# Patient Record
Sex: FEMALE | Race: WHITE | NOT HISPANIC OR LATINO | Employment: PART TIME | URBAN - METROPOLITAN AREA
[De-identification: names, ages, dates, MRNs, and addresses within clinical notes are randomized per-mention and may not be internally consistent; named-entity substitution may affect disease eponyms.]

---

## 2017-04-14 ENCOUNTER — OFFICE VISIT (OUTPATIENT)
Dept: INTERNAL MEDICINE | Facility: CLINIC | Age: 52
End: 2017-04-14

## 2017-04-14 VITALS
HEIGHT: 67 IN | SYSTOLIC BLOOD PRESSURE: 116 MMHG | TEMPERATURE: 97.8 F | HEART RATE: 92 BPM | DIASTOLIC BLOOD PRESSURE: 72 MMHG | RESPIRATION RATE: 16 BRPM | WEIGHT: 195.4 LBS | OXYGEN SATURATION: 95 % | BODY MASS INDEX: 30.67 KG/M2

## 2017-04-14 DIAGNOSIS — Z00.00 ENCOUNTER FOR ANNUAL PHYSICAL EXAM: Primary | ICD-10-CM

## 2017-04-14 LAB
ALBUMIN SERPL-MCNC: 4.4 G/DL (ref 3.5–5.2)
ALBUMIN/GLOB SERPL: 1.5 G/DL
ALP SERPL-CCNC: 127 U/L (ref 39–117)
ALT SERPL-CCNC: 38 U/L (ref 1–33)
AST SERPL-CCNC: 32 U/L (ref 1–32)
BASOPHILS # BLD AUTO: 0.03 10*3/MM3 (ref 0–0.2)
BASOPHILS NFR BLD AUTO: 0.5 % (ref 0–1.5)
BILIRUB BLD-MCNC: NEGATIVE MG/DL
BILIRUB SERPL-MCNC: 0.4 MG/DL (ref 0.1–1.2)
BUN SERPL-MCNC: 17 MG/DL (ref 6–20)
BUN/CREAT SERPL: 20.5 (ref 7–25)
CALCIUM SERPL-MCNC: 10 MG/DL (ref 8.6–10.5)
CHLORIDE SERPL-SCNC: 102 MMOL/L (ref 98–107)
CHOLEST SERPL-MCNC: 303 MG/DL (ref 0–200)
CLARITY, POC: CLEAR
CO2 SERPL-SCNC: 25.7 MMOL/L (ref 22–29)
COLOR UR: YELLOW
CREAT SERPL-MCNC: 0.83 MG/DL (ref 0.57–1)
EOSINOPHIL # BLD AUTO: 0.36 10*3/MM3 (ref 0–0.7)
EOSINOPHIL NFR BLD AUTO: 5.9 % (ref 0.3–6.2)
ERYTHROCYTE [DISTWIDTH] IN BLOOD BY AUTOMATED COUNT: 14.9 % (ref 11.7–13)
GLOBULIN SER CALC-MCNC: 3 GM/DL
GLUCOSE SERPL-MCNC: 92 MG/DL (ref 65–99)
GLUCOSE UR STRIP-MCNC: NEGATIVE MG/DL
HCT VFR BLD AUTO: 41.5 % (ref 35.6–45.5)
HDLC SERPL-MCNC: 56 MG/DL (ref 40–60)
HGB BLD-MCNC: 14.1 G/DL (ref 11.9–15.5)
IMM GRANULOCYTES # BLD: 0 10*3/MM3 (ref 0–0.03)
IMM GRANULOCYTES NFR BLD: 0 % (ref 0–0.5)
KETONES UR QL: NEGATIVE
LDLC SERPL CALC-MCNC: 195 MG/DL (ref 0–100)
LEUKOCYTE EST, POC: ABNORMAL
LYMPHOCYTES # BLD AUTO: 2.02 10*3/MM3 (ref 0.9–4.8)
LYMPHOCYTES NFR BLD AUTO: 33.2 % (ref 19.6–45.3)
MCH RBC QN AUTO: 29.9 PG (ref 26.9–32)
MCHC RBC AUTO-ENTMCNC: 34 G/DL (ref 32.4–36.3)
MCV RBC AUTO: 87.9 FL (ref 80.5–98.2)
MONOCYTES # BLD AUTO: 0.4 10*3/MM3 (ref 0.2–1.2)
MONOCYTES NFR BLD AUTO: 6.6 % (ref 5–12)
NEUTROPHILS # BLD AUTO: 3.28 10*3/MM3 (ref 1.9–8.1)
NEUTROPHILS NFR BLD AUTO: 53.8 % (ref 42.7–76)
NITRITE UR-MCNC: NEGATIVE MG/ML
PH UR: 5.5 [PH] (ref 5–8)
PLATELET # BLD AUTO: 255 10*3/MM3 (ref 140–500)
POTASSIUM SERPL-SCNC: 4.3 MMOL/L (ref 3.5–5.2)
PROT SERPL-MCNC: 7.4 G/DL (ref 6–8.5)
PROT UR STRIP-MCNC: NEGATIVE MG/DL
RBC # BLD AUTO: 4.72 10*6/MM3 (ref 3.9–5.2)
RBC # UR STRIP: NEGATIVE /UL
SODIUM SERPL-SCNC: 142 MMOL/L (ref 136–145)
SP GR UR: 1.03 (ref 1–1.03)
TRIGL SERPL-MCNC: 258 MG/DL (ref 0–150)
UROBILINOGEN UR QL: NORMAL
VLDLC SERPL CALC-MCNC: 51.6 MG/DL (ref 5–40)
WBC # BLD AUTO: 6.09 10*3/MM3 (ref 4.5–10.7)

## 2017-04-14 PROCEDURE — 81003 URINALYSIS AUTO W/O SCOPE: CPT | Performed by: INTERNAL MEDICINE

## 2017-04-14 PROCEDURE — 99396 PREV VISIT EST AGE 40-64: CPT | Performed by: INTERNAL MEDICINE

## 2017-04-14 RX ORDER — METRONIDAZOLE 10 MG/G
GEL TOPICAL DAILY
COMMUNITY
End: 2017-08-08
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
## 2017-04-17 RX ORDER — ATORVASTATIN CALCIUM 40 MG/1
40 TABLET, FILM COATED ORAL DAILY
Qty: 90 TABLET | Refills: 1 | Status: SHIPPED | OUTPATIENT
Start: 2017-04-17 | End: 2017-07-18

## 2017-05-02 ENCOUNTER — TELEPHONE (OUTPATIENT)
Dept: INTERNAL MEDICINE | Facility: CLINIC | Age: 52
End: 2017-05-02

## 2017-05-08 ENCOUNTER — TELEPHONE (OUTPATIENT)
Dept: INTERNAL MEDICINE | Facility: CLINIC | Age: 52
End: 2017-05-08

## 2017-05-08 RX ORDER — ROSUVASTATIN CALCIUM 5 MG/1
5 TABLET, COATED ORAL DAILY
Qty: 30 TABLET | Refills: 1 | Status: SHIPPED | OUTPATIENT
Start: 2017-05-08 | End: 2017-08-24 | Stop reason: SDUPTHER

## 2017-05-16 ENCOUNTER — TELEPHONE (OUTPATIENT)
Dept: INTERNAL MEDICINE | Facility: CLINIC | Age: 52
End: 2017-05-16

## 2017-05-16 RX ORDER — ZOLPIDEM TARTRATE 5 MG/1
TABLET ORAL
Qty: 2 TABLET | Refills: 0 | OUTPATIENT
Start: 2017-05-16 | End: 2017-07-18

## 2017-07-18 ENCOUNTER — OFFICE VISIT (OUTPATIENT)
Dept: INTERNAL MEDICINE | Facility: CLINIC | Age: 52
End: 2017-07-18

## 2017-07-18 VITALS
HEIGHT: 67 IN | RESPIRATION RATE: 16 BRPM | HEART RATE: 70 BPM | OXYGEN SATURATION: 96 % | BODY MASS INDEX: 31.36 KG/M2 | SYSTOLIC BLOOD PRESSURE: 102 MMHG | WEIGHT: 199.8 LBS | TEMPERATURE: 98 F | DIASTOLIC BLOOD PRESSURE: 70 MMHG

## 2017-07-18 DIAGNOSIS — K21.9 GASTROESOPHAGEAL REFLUX DISEASE WITHOUT ESOPHAGITIS: ICD-10-CM

## 2017-07-18 DIAGNOSIS — E78.2 MIXED HYPERLIPIDEMIA: Primary | ICD-10-CM

## 2017-07-18 LAB
CHOLEST SERPL-MCNC: 304 MG/DL (ref 0–200)
HDLC SERPL-MCNC: 52 MG/DL (ref 40–60)
LDLC SERPL CALC-MCNC: 222 MG/DL (ref 0–100)
TRIGL SERPL-MCNC: 152 MG/DL (ref 0–150)
VLDLC SERPL CALC-MCNC: 30.4 MG/DL (ref 5–40)

## 2017-07-18 PROCEDURE — 99213 OFFICE O/P EST LOW 20 MIN: CPT | Performed by: INTERNAL MEDICINE

## 2017-07-18 NOTE — PROGRESS NOTES
Subjective   Echo Montelongo is a 51 y.o. female.     Chief Complaint   Patient presents with   • Hyperlipidemia     re-check   • Heartburn         Hyperlipidemia   This is a chronic problem. The current episode started more than 1 year ago. The problem is controlled. Recent lipid tests were reviewed and are variable. There are no known factors aggravating her hyperlipidemia. Associated symptoms include myalgias. Pertinent negatives include no chest pain or shortness of breath. She is currently on no antihyperlipidemic treatment. There are no compliance problems.    Heartburn   She complains of abdominal pain, belching, dysphagia and heartburn. She reports no chest pain, no nausea or no wheezing. This is a new problem. The current episode started 1 to 4 weeks ago. The problem occurs frequently. The problem has been unchanged. Nothing aggravates the symptoms. Risk factors include hiatal hernia. She has tried an antacid and a PPI for the symptoms.        The following portions of the patient's history were reviewed and updated as appropriate: allergies, current medications, past social history and problem list.    Outpatient Prescriptions Marked as Taking for the 7/18/17 encounter (Office Visit) with Frandy Mejia MD   Medication Sig Dispense Refill   • metroNIDAZOLE (METROGEL) 1 % gel Apply  topically Daily.     • [DISCONTINUED] zolpidem (AMBIEN) 5 MG tablet 1 tablet by mouth at time of flight. 2 tablet 0       Review of Systems   Respiratory: Negative for shortness of breath and wheezing.    Cardiovascular: Negative for chest pain and palpitations.   Gastrointestinal: Positive for abdominal pain, dysphagia and heartburn. Negative for nausea.   Musculoskeletal: Positive for myalgias.       Objective   Vitals:    07/18/17 0844   BP: 102/70   Pulse: 70   Resp: 16   Temp: 98 °F (36.7 °C)   SpO2: 96%      Last Weight    07/18/17  0844   Weight: 199 lb 12.8 oz (90.6 kg)    [unfilled]  Body mass index is 31.29  kg/(m^2).      Physical Exam   Constitutional: She appears well-developed and well-nourished. No distress.   HENT:   Head: Normocephalic and atraumatic.   Neck: Carotid bruit is not present.   Cardiovascular: Normal rate, regular rhythm, normal heart sounds and intact distal pulses.  Exam reveals no gallop.    No murmur heard.  Pulmonary/Chest: Effort normal and breath sounds normal. No respiratory distress. She has no wheezes. She has no rales.   Abdominal: Soft. Bowel sounds are normal. She exhibits no mass. There is no tenderness. There is no guarding.         Problem List Items Addressed This Visit        Cardiovascular and Mediastinum    Mixed hyperlipidemia - Primary    Relevant Orders    Lipid Panel       Digestive    GERD (gastroesophageal reflux disease)        Assessment/Plan   In for recheck of lipids.  She did not tolerate Lipitor.  Crestor was better but still caused myalgias.  She is now off of therapy and trying to control things with diet.  She's developed heartburn in the past month.  She is actually had a long history of heartburn on and off.  It resolved with weight loss a few years ago.  Generally over the year she's used PPIs and antacids when she's needed them.  In the past month she's developed some midsternal dysphagia..  Fatigue continues to be a problem.  Her sleep is disrupted.  She is up multiple times per night.  She generally tries to get 8 hours and other than to disruptions.  Continues to have hair loss.  Eyelashes and scalp.  The scalp is better.  She is already on Latisse for her eyelashes.   Will repeat lipids today.  She is fasting.  We'll begin on Protonix 40 mg daily for her GI symptoms.  If they do not resolve she'll need an EGD.  She had a upper GI several years ago showed a small hiatal hernia.She is off of lipid treatment right now due to some myalgias on the Crestor.  If lipids are still elevated she's going to try the Crestor again with some additional co Q 10 200 mg  daily.  If that's the problem will switch to Pravachol.         Dragon disclaimer:   Much of this encounter note is an electronic transcription/translation of spoken language to printed text. The electronic translation of spoken language may permit erroneous, or at times, nonsensical words or phrases to be inadvertently transcribed; Although I have reviewed the note for such errors, some may still exist.

## 2017-08-08 ENCOUNTER — OFFICE VISIT (OUTPATIENT)
Dept: OBSTETRICS AND GYNECOLOGY | Age: 52
End: 2017-08-08

## 2017-08-08 VITALS
SYSTOLIC BLOOD PRESSURE: 110 MMHG | BODY MASS INDEX: 31.55 KG/M2 | HEIGHT: 67 IN | DIASTOLIC BLOOD PRESSURE: 72 MMHG | WEIGHT: 201 LBS

## 2017-08-08 DIAGNOSIS — Z87.42 H/O ABNORMAL CERVICAL PAPANICOLAOU SMEAR: ICD-10-CM

## 2017-08-08 DIAGNOSIS — R39.15 URINARY URGENCY: ICD-10-CM

## 2017-08-08 DIAGNOSIS — Z01.419 WELL WOMAN EXAM WITH ROUTINE GYNECOLOGICAL EXAM: Primary | ICD-10-CM

## 2017-08-08 DIAGNOSIS — Z11.51 SCREENING FOR HPV (HUMAN PAPILLOMAVIRUS): ICD-10-CM

## 2017-08-08 LAB
BILIRUB BLD-MCNC: NEGATIVE MG/DL
CLARITY, POC: CLEAR
COLOR UR: YELLOW
GLUCOSE UR STRIP-MCNC: NEGATIVE MG/DL
KETONES UR QL: NEGATIVE
LEUKOCYTE EST, POC: NEGATIVE
NITRITE UR-MCNC: POSITIVE MG/ML
PH UR: 5.5 [PH] (ref 5–8)
PROT UR STRIP-MCNC: NEGATIVE MG/DL
RBC # UR STRIP: NEGATIVE /UL
SP GR UR: 1.02 (ref 1–1.03)
UROBILINOGEN UR QL: NORMAL

## 2017-08-08 PROCEDURE — 81002 URINALYSIS NONAUTO W/O SCOPE: CPT | Performed by: PHYSICIAN ASSISTANT

## 2017-08-08 PROCEDURE — 99396 PREV VISIT EST AGE 40-64: CPT | Performed by: PHYSICIAN ASSISTANT

## 2017-08-08 RX ORDER — OMEPRAZOLE 20 MG/1
20 CAPSULE, DELAYED RELEASE ORAL DAILY
COMMUNITY
End: 2018-03-30

## 2017-08-08 RX ORDER — NITROFURANTOIN 25; 75 MG/1; MG/1
100 CAPSULE ORAL 2 TIMES DAILY
Qty: 14 CAPSULE | Refills: 0 | Status: SHIPPED | OUTPATIENT
Start: 2017-08-08 | End: 2017-08-15

## 2017-08-08 NOTE — PROGRESS NOTES
Subjective     Chief Complaint   Patient presents with   • Gynecologic Exam     annual exam.       History of Present Illness    Echo Montelongo is a 51 y.o.  who presents for annual exam.    She is feeling a little bit crappy every day and thinks it is r/t her MP state.  She was given HRT by Dr Ge and had it filled but never took it.  She has been working on diet and exercise and trying to do the right thing.  She did work with a naturopath and took some herbs for a little while but recently found that her cholesterol was high and she is being tested for fatty liver.  She is also working on meditation but still has issues with sleep.  She is not having hot flashes but wakes up periodically and is unsure what is causing her to wake up.  She is really hesitant to take the HRT    She is also noting possible yeast infections. She notes a vaginal burning.  She does not self treat, she just waits for it to go away. She can't tell if it is a UTI or something else.  She has been to UC a few times and cultures have shown a UTI.  She was given macrodantin to take prn IC but forgets to use it.      She is utd on her exams with Dr Mejia and is working on decreasing her cholesterol.  She is on crestor    She is a PT at [employer]        Obstetric History:  OB History      Para Term  AB TAB SAB Ectopic Multiple Living    2 2 1 1      2         Menstrual History:     No LMP recorded. Patient has had an ablation.         Current contraception: post menopausal status  History of abnormal Pap smear: yes - colpo 2 years ago and it was wnl  Received Gardasil immunization: no  Perform regular self breast exam: yes - occl  Family history of uterine or ovarian cancer: no  Family History of colon cancer: no  Family history of breast cancer: no    Mammogram: ordered.  Colonoscopy: recommended. Thinks she had one in .  Will check with   DEXA: not indicated.    Exercise: not active  Calcium/Vitamin D: adequate  "intake    The following portions of the patient's history were reviewed and updated as appropriate: allergies, current medications, past family history, past medical history, past social history, past surgical history and problem list.    Review of Systems   All other systems reviewed and are negative.      Review of Systems   Constitutional: Negative for fatigue.   Respiratory: Negative for shortness of breath.    Gastrointestinal: Negative for abdominal pain.   Genitourinary: Negative for dysuria.   Neurological: Negative for headaches.   Psychiatric/Behavioral: Negative for dysphoric mood.         Objective   Physical Exam    /72  Ht 67\" (170.2 cm)  Wt 201 lb (91.2 kg)  Breastfeeding? No  BMI 31.48 kg/m2    General:   alert, appears stated age and cooperative   Neck: no adenopathy and thyroid normal to palpation   Heart: regular rate and rhythm   Lungs: clear to auscultation bilaterally   Abdomen: soft and nontender   Breast: inspection negative, no nipple discharge or bleeding, no masses or nodularity palpable   Vulva: normal   Vagina: normal mucosa   Cervix: no lesions   Uterus: normal size, non-tender   Adnexa: normal adnexa and no mass, fullness, tenderness   Rectal: normal rectal, no masses, guaiac negative stool obtained and hemorrhoids: external non-thrombosed     Assessment/Plan   Echo was seen today for gynecologic exam.    Diagnoses and all orders for this visit:    Well woman exam with routine gynecological exam  -     IGP, Aptima HPV, Rfx 16 / 18,45    Urinary urgency  -     POC Urinalysis Dipstick  -     Urine Culture    Screening for HPV (human papillomavirus)  -     IGP, Aptima HPV, Rfx 16 / 18,45    H/O abnormal cervical Papanicolaou smear  -     IGP, Aptima HPV, Rfx 16 / 18,45    Other orders  -     nitrofurantoin, macrocrystal-monohydrate, (MACROBID) 100 MG capsule; Take 1 capsule by mouth 2 (Two) Times a Day for 7 days.        All questions answered.  Breast self exam technique " reviewed and patient encouraged to perform self-exam monthly.  Discussed healthy lifestyle modifications.  Recommended 30 minutes of aerobic exercise five times per week.  Discussed calcium needs to prevent osteoporosis.    Disc pap guidelines, will do pap with HPV  Disc MP sx's, she prefers to try her methods now and will call if she wants to do something different later (try HRT)  Disc recurrent UTI's, discussed estrace and gave sample to try .  Will send in rx as well.  Disc that she might find some benefit to her vaginal estrogen as far as some systemic SE as well  Will treat suspected UTI and discussed using macrodantin prn IC in meantime as well as urinating after IC and increasing hydration.

## 2017-08-11 LAB
BACTERIA UR CULT: ABNORMAL
BACTERIA UR CULT: ABNORMAL
CYTOLOGIST CVX/VAG CYTO: ABNORMAL
CYTOLOGY CVX/VAG DOC THIN PREP: ABNORMAL
DX ICD CODE: ABNORMAL
DX ICD CODE: ABNORMAL
HIV 1 & 2 AB SER-IMP: ABNORMAL
HPV I/H RISK 4 DNA CVX QL PROBE+SIG AMP: POSITIVE
OTHER ANTIBIOTIC SUSC ISLT: ABNORMAL
OTHER STN SPEC: ABNORMAL
PATH REPORT.FINAL DX SPEC: ABNORMAL
PATHOLOGIST CVX/VAG CYTO: ABNORMAL
STAT OF ADQ CVX/VAG CYTO-IMP: ABNORMAL

## 2017-08-14 ENCOUNTER — TELEPHONE (OUTPATIENT)
Dept: OBSTETRICS AND GYNECOLOGY | Age: 52
End: 2017-08-14

## 2017-08-14 NOTE — TELEPHONE ENCOUNTER
----- Message from Pino Ge MD sent at 8/14/2017  2:22 PM EDT -----  Please notify patient Pap smear shows dysplasia low-grade, positive HPV and requires follow-up colposcopy preferably in 6-8 weeks to give the cervix a chance to change

## 2017-08-24 RX ORDER — ROSUVASTATIN CALCIUM 5 MG/1
TABLET, COATED ORAL
Qty: 30 TABLET | Refills: 0 | Status: SHIPPED | OUTPATIENT
Start: 2017-08-24 | End: 2017-09-01 | Stop reason: SINTOL

## 2017-09-01 ENCOUNTER — TELEPHONE (OUTPATIENT)
Dept: INTERNAL MEDICINE | Facility: CLINIC | Age: 52
End: 2017-09-01

## 2017-09-01 RX ORDER — PRAVASTATIN SODIUM 10 MG
10 TABLET ORAL DAILY
Qty: 90 TABLET | Refills: 1 | Status: SHIPPED | OUTPATIENT
Start: 2017-09-01 | End: 2017-12-01

## 2017-09-01 NOTE — TELEPHONE ENCOUNTER
Patient finished her bottle of Crestor. She has been taking co q 10 along with it for the last 2-3 weeks, but she is still having muscle and joint aches. She does feel better on the Crestor than she did on the Lipitor, but she is wanting to try something else that wont give her these joint aches. Please advise.      Tell her that Pravachol is probably the best tolerated statin.  Begin pravastatin 10 mg daily.  Continue on the co Q 10 with it.  200 mg once a day.

## 2017-09-20 ENCOUNTER — PREP FOR SURGERY (OUTPATIENT)
Dept: OTHER | Facility: HOSPITAL | Age: 52
End: 2017-09-20

## 2017-09-20 ENCOUNTER — PROCEDURE VISIT (OUTPATIENT)
Dept: OBSTETRICS AND GYNECOLOGY | Age: 52
End: 2017-09-20

## 2017-09-20 VITALS
HEIGHT: 67 IN | BODY MASS INDEX: 31.55 KG/M2 | WEIGHT: 201 LBS | DIASTOLIC BLOOD PRESSURE: 72 MMHG | SYSTOLIC BLOOD PRESSURE: 120 MMHG

## 2017-09-20 DIAGNOSIS — N94.10 DYSPAREUNIA IN FEMALE: ICD-10-CM

## 2017-09-20 DIAGNOSIS — N94.19 DYSPAREUNIA DUE TO MEDICAL CONDITION IN FEMALE: Primary | ICD-10-CM

## 2017-09-20 DIAGNOSIS — N94.9 PERINEAL DISCOMFORT IN FEMALE: ICD-10-CM

## 2017-09-20 DIAGNOSIS — B97.7 HPV IN FEMALE: ICD-10-CM

## 2017-09-20 DIAGNOSIS — R87.612 LGSIL OF CERVIX OF UNDETERMINED SIGNIFICANCE: Primary | ICD-10-CM

## 2017-09-20 RX ORDER — SODIUM CHLORIDE 0.9 % (FLUSH) 0.9 %
1-10 SYRINGE (ML) INJECTION AS NEEDED
Status: CANCELLED | OUTPATIENT
Start: 2017-12-06

## 2017-09-20 NOTE — PROGRESS NOTES
Procedure   Procedures       Physical Exam    Colposcopy Procedure Note    Pre-op:  Post-op:    Indications: Pap smear   showed: low-grade squamous intraepithelial neoplasia (LGSIL - encompassing HPV,mild dysplasia,MICHELLE I)    Procedure Details   The risks and benefits of the procedure and verbal, informed consent obtained.    Speculum placed in vagina and excellent visualization of cervix achieved, cervix swabbed x 3 with acetic acid solution.    Findings:  Cervix: no visible lesions, no mosaicism, no punctation and no abnormal vasculature; cervix swabbed with Lugol's solution, endocervical speculum placed and SCJ visualized 360 degrees without lesions.  Vaginal inspection: normal without visible lesions.  Vulvar colposcopy: acetic acid applied o vulvar tissue.    Specimens: Ectocervical and endocervical    Complications: none.    Plan:  Will be based on findings and results from today's exam. Patient also is complaining of perineal discomfort occasional dyspareunia she has a little bit of a relaxed introitus but states that she has discomfort right around the 6:00 region. Is requesting a perineorrhaphy to revise her obstetrical tears. Discussed risks benefits potential options and alternatives.        9/20/2017  Pino Ge MD    EMR Dragon/ Transcription disclaimer:  Much of the encounter note is an electronic transcription/translation of spoken language to printed text. The electronic translation of spoken language may permit erroneous, or at times, nonessential words or phrases to be inadvertently transcribes; Although i have reviewed the note for such errors, some may still exist.

## 2017-09-25 LAB
DX ICD CODE: NORMAL
DX ICD CODE: NORMAL
PATH REPORT.FINAL DX SPEC: NORMAL
PATH REPORT.GROSS SPEC: NORMAL
PATH REPORT.SITE OF ORIGIN SPEC: NORMAL
PATHOLOGIST NAME: NORMAL
PAYMENT PROCEDURE: NORMAL

## 2017-09-26 ENCOUNTER — TELEPHONE (OUTPATIENT)
Dept: OBSTETRICS AND GYNECOLOGY | Age: 52
End: 2017-09-26

## 2017-09-26 NOTE — TELEPHONE ENCOUNTER
----- Message from Pino Ge MD sent at 9/25/2017  4:35 PM EDT -----  Please notify pt. That labs are with in normal limits

## 2017-10-03 PROBLEM — N94.19 DYSPAREUNIA DUE TO MEDICAL CONDITION IN FEMALE: Status: ACTIVE | Noted: 2017-10-03

## 2017-12-01 ENCOUNTER — APPOINTMENT (OUTPATIENT)
Dept: PREADMISSION TESTING | Facility: HOSPITAL | Age: 52
End: 2017-12-01

## 2017-12-01 VITALS
BODY MASS INDEX: 30.81 KG/M2 | HEART RATE: 77 BPM | WEIGHT: 196.3 LBS | OXYGEN SATURATION: 98 % | DIASTOLIC BLOOD PRESSURE: 74 MMHG | TEMPERATURE: 97.1 F | RESPIRATION RATE: 20 BRPM | HEIGHT: 67 IN | SYSTOLIC BLOOD PRESSURE: 98 MMHG

## 2017-12-01 LAB
DEPRECATED RDW RBC AUTO: 43.4 FL (ref 37–54)
ERYTHROCYTE [DISTWIDTH] IN BLOOD BY AUTOMATED COUNT: 13 % (ref 11.7–13)
HCT VFR BLD AUTO: 42.9 % (ref 35.6–45.5)
HGB BLD-MCNC: 14.4 G/DL (ref 11.9–15.5)
MCH RBC QN AUTO: 30.8 PG (ref 26.9–32)
MCHC RBC AUTO-ENTMCNC: 33.6 G/DL (ref 32.4–36.3)
MCV RBC AUTO: 91.9 FL (ref 80.5–98.2)
PLATELET # BLD AUTO: 211 10*3/MM3 (ref 140–500)
PMV BLD AUTO: 11.1 FL (ref 6–12)
RBC # BLD AUTO: 4.67 10*6/MM3 (ref 3.9–5.2)
WBC NRBC COR # BLD: 3.9 10*3/MM3 (ref 4.5–10.7)

## 2017-12-01 PROCEDURE — 36415 COLL VENOUS BLD VENIPUNCTURE: CPT

## 2017-12-01 PROCEDURE — 85027 COMPLETE CBC AUTOMATED: CPT | Performed by: OBSTETRICS & GYNECOLOGY

## 2017-12-01 NOTE — DISCHARGE INSTRUCTIONS
Take the following medications the morning of surgery with a small sip of water:  OMEPRAZOLE      General Instructions:  • Do not eat solid food after midnight the night before surgery.  • You may drink clear liquids day of surgery but must stop at least one hour before your hospital arrival time.  • It is beneficial for you to have a clear drink that contains carbohydrates the day of surgery.  We suggest a 12 to 20 ounce bottle of Gatorade or Powerade for non-diabetic patients or a 12 to 20 ounce bottle of G2 or Powerade Zero for diabetic patients. (Pediatric patients, are not advised to drink a 12 to 20 ounce carbohydrate drink)    Clear liquids are liquids you can see through.  Nothing red in color.     Plain water                               Sports drinks  Sodas                                   Gelatin (Jell-O)  Fruit juices without pulp such as white grape juice and apple juice  Popsicles that contain no fruit or yogurt  Tea or coffee (no cream or milk added)  Gatorade / Powerade  G2 / Powerade Zero    • Infants may have breast milk up to four hours before surgery.  • Infants drinking formula may drink formula up to six hours before surgery.   • Patients who avoid smoking, chewing tobacco and alcohol for 4 weeks prior to surgery have a reduced risk of post-operative complications.  Quit smoking as many days before surgery as you can.  • Do not smoke, use chewing tobacco or drink alcohol the day of surgery.   • If applicable bring your C-PAP/ BI-PAP machine.  • Bring any papers given to you in the doctor’s office.  • Wear clean comfortable clothes and socks.  • Do not wear contact lenses or make-up.  Bring a case for your glasses.   • Bring crutches or walker if applicable.  • Remove all piercings.  Leave jewelry and any other valuables at home.  • The Pre-Admission Testing nurse will instruct you to bring medications if unable to obtain an accurate list in Pre-Admission Testing.        If you were given a  blood bank ID arm band remember to bring it with you the day of surgery.    Preventing a Surgical Site Infection:  • For 2 to 3 days before surgery, avoid shaving with a razor because the razor can irritate skin and make it easier to develop an infection.  • The night prior to surgery sleep in a clean bed with clean clothing.  Do not allow pets to sleep with you.  • Shower on the morning of surgery using a fresh bar of anti-bacterial soap (such as Dial) and clean washcloth.  Dry with a clean towel and dress in clean clothing.  • Ask your surgeon if you will be receiving antibiotics prior to surgery.  • Make sure you, your family, and all healthcare providers clean their hands with soap and water or an alcohol based hand  before caring for you or your wound.    Day of surgery: 12/6/2017 ARRIVAL TIME 12:30 PM  Upon arrival, a Pre-op nurse and Anesthesiologist will review your health history, obtain vital signs, and answer questions you may have.  The only belongings needed at this time will be your home medications and if applicable your C-PAP/BI-PAP machine.  If you are staying overnight your family can leave the rest of your belongings in the car and bring them to your room later.  A Pre-op nurse will start an IV and you may receive medication in preparation for surgery, including something to help you relax.  Your family will be able to see you in the Pre-op area.  While you are in surgery your family should notify the waiting room  if they leave the waiting room area and provide a contact phone number.    Please be aware that surgery does come with discomfort.  We want to make every effort to control your discomfort so please discuss any uncontrolled symptoms with your nurse.   Your doctor will most likely have prescribed pain medications.      If you are going home after surgery you will receive individualized written care instructions before being discharged.  A responsible adult must drive  you to and from the hospital on the day of your surgery and stay with you for 24 hours.    If you are staying overnight following surgery, you will be transported to your hospital room following the recovery period.  [facility] has all private rooms.    If you have any questions please call Pre-Admission Testing at [phone].  Deductibles and co-payments are collected on the day of service. Please be prepared to pay the required co-pay, deductible or deposit on the day of service as defined by your plan.

## 2017-12-04 RX ORDER — FLUCONAZOLE 150 MG/1
TABLET ORAL
Qty: 2 TABLET | Refills: 0 | Status: SHIPPED | OUTPATIENT
Start: 2017-12-04 | End: 2018-03-30

## 2017-12-06 ENCOUNTER — ANESTHESIA (OUTPATIENT)
Dept: PERIOP | Facility: HOSPITAL | Age: 52
End: 2017-12-06

## 2017-12-06 ENCOUNTER — HOSPITAL ENCOUNTER (OUTPATIENT)
Facility: HOSPITAL | Age: 52
Setting detail: HOSPITAL OUTPATIENT SURGERY
Discharge: HOME | End: 2017-12-06
Attending: OBSTETRICS & GYNECOLOGY | Admitting: OBSTETRICS & GYNECOLOGY

## 2017-12-06 ENCOUNTER — ANESTHESIA EVENT (OUTPATIENT)
Dept: PERIOP | Facility: HOSPITAL | Age: 52
End: 2017-12-06

## 2017-12-06 VITALS
OXYGEN SATURATION: 100 % | HEART RATE: 74 BPM | BODY MASS INDEX: 31.32 KG/M2 | SYSTOLIC BLOOD PRESSURE: 118 MMHG | WEIGHT: 200 LBS | RESPIRATION RATE: 16 BRPM | DIASTOLIC BLOOD PRESSURE: 73 MMHG | TEMPERATURE: 97.3 F

## 2017-12-06 DIAGNOSIS — N94.19 DYSPAREUNIA DUE TO MEDICAL CONDITION IN FEMALE: Primary | ICD-10-CM

## 2017-12-06 PROCEDURE — 25010000002 ONDANSETRON PER 1 MG: Performed by: NURSE ANESTHETIST, CERTIFIED REGISTERED

## 2017-12-06 PROCEDURE — 25010000002 MIDAZOLAM PER 1 MG: Performed by: ANESTHESIOLOGY

## 2017-12-06 PROCEDURE — S0260 H&P FOR SURGERY: HCPCS | Performed by: OBSTETRICS & GYNECOLOGY

## 2017-12-06 PROCEDURE — 88304 TISSUE EXAM BY PATHOLOGIST: CPT | Performed by: OBSTETRICS & GYNECOLOGY

## 2017-12-06 PROCEDURE — 25010000002 DEXAMETHASONE PER 1 MG: Performed by: NURSE ANESTHETIST, CERTIFIED REGISTERED

## 2017-12-06 PROCEDURE — 56810 PERINEOPLASTY RPR PER NONOB: CPT | Performed by: OBSTETRICS & GYNECOLOGY

## 2017-12-06 PROCEDURE — 88342 IMHCHEM/IMCYTCHM 1ST ANTB: CPT | Performed by: OBSTETRICS & GYNECOLOGY

## 2017-12-06 PROCEDURE — 25010000002 FENTANYL CITRATE (PF) 100 MCG/2ML SOLUTION: Performed by: NURSE ANESTHETIST, CERTIFIED REGISTERED

## 2017-12-06 PROCEDURE — 88341 IMHCHEM/IMCYTCHM EA ADD ANTB: CPT | Performed by: OBSTETRICS & GYNECOLOGY

## 2017-12-06 PROCEDURE — 25010000002 PROPOFOL 10 MG/ML EMULSION: Performed by: NURSE ANESTHETIST, CERTIFIED REGISTERED

## 2017-12-06 PROCEDURE — 25010000002 KETOROLAC TROMETHAMINE PER 15 MG: Performed by: NURSE ANESTHETIST, CERTIFIED REGISTERED

## 2017-12-06 RX ORDER — DIPHENHYDRAMINE HYDROCHLORIDE 50 MG/ML
12.5 INJECTION INTRAMUSCULAR; INTRAVENOUS
Status: DISCONTINUED | OUTPATIENT
Start: 2017-12-06 | End: 2017-12-06 | Stop reason: HOSPADM

## 2017-12-06 RX ORDER — CEFAZOLIN SODIUM 2 G/100ML
2 INJECTION, SOLUTION INTRAVENOUS ONCE
Status: DISCONTINUED | OUTPATIENT
Start: 2017-12-06 | End: 2017-12-06 | Stop reason: HOSPADM

## 2017-12-06 RX ORDER — KETOROLAC TROMETHAMINE 30 MG/ML
INJECTION, SOLUTION INTRAMUSCULAR; INTRAVENOUS AS NEEDED
Status: DISCONTINUED | OUTPATIENT
Start: 2017-12-06 | End: 2017-12-06 | Stop reason: SURG

## 2017-12-06 RX ORDER — ONDANSETRON 2 MG/ML
4 INJECTION INTRAMUSCULAR; INTRAVENOUS ONCE AS NEEDED
Status: DISCONTINUED | OUTPATIENT
Start: 2017-12-06 | End: 2017-12-06 | Stop reason: HOSPADM

## 2017-12-06 RX ORDER — PROMETHAZINE HYDROCHLORIDE 25 MG/1
25 SUPPOSITORY RECTAL ONCE AS NEEDED
Status: DISCONTINUED | OUTPATIENT
Start: 2017-12-06 | End: 2017-12-06 | Stop reason: HOSPADM

## 2017-12-06 RX ORDER — HYDRALAZINE HYDROCHLORIDE 20 MG/ML
5 INJECTION INTRAMUSCULAR; INTRAVENOUS
Status: DISCONTINUED | OUTPATIENT
Start: 2017-12-06 | End: 2017-12-06 | Stop reason: HOSPADM

## 2017-12-06 RX ORDER — OXYCODONE AND ACETAMINOPHEN 7.5; 325 MG/1; MG/1
1 TABLET ORAL ONCE AS NEEDED
Status: DISCONTINUED | OUTPATIENT
Start: 2017-12-06 | End: 2017-12-06 | Stop reason: HOSPADM

## 2017-12-06 RX ORDER — SODIUM CHLORIDE 0.9 % (FLUSH) 0.9 %
1-10 SYRINGE (ML) INJECTION AS NEEDED
Status: DISCONTINUED | OUTPATIENT
Start: 2017-12-06 | End: 2017-12-06 | Stop reason: HOSPADM

## 2017-12-06 RX ORDER — LIDOCAINE HYDROCHLORIDE AND EPINEPHRINE BITARTRATE 20; .01 MG/ML; MG/ML
INJECTION, SOLUTION SUBCUTANEOUS AS NEEDED
Status: DISCONTINUED | OUTPATIENT
Start: 2017-12-06 | End: 2017-12-06 | Stop reason: HOSPADM

## 2017-12-06 RX ORDER — MAGNESIUM HYDROXIDE 1200 MG/15ML
LIQUID ORAL AS NEEDED
Status: DISCONTINUED | OUTPATIENT
Start: 2017-12-06 | End: 2017-12-06 | Stop reason: HOSPADM

## 2017-12-06 RX ORDER — MIDAZOLAM HYDROCHLORIDE 1 MG/ML
2 INJECTION INTRAMUSCULAR; INTRAVENOUS
Status: DISCONTINUED | OUTPATIENT
Start: 2017-12-06 | End: 2017-12-06 | Stop reason: HOSPADM

## 2017-12-06 RX ORDER — FLUMAZENIL 0.1 MG/ML
0.2 INJECTION INTRAVENOUS AS NEEDED
Status: DISCONTINUED | OUTPATIENT
Start: 2017-12-06 | End: 2017-12-06 | Stop reason: HOSPADM

## 2017-12-06 RX ORDER — PROMETHAZINE HYDROCHLORIDE 25 MG/1
25 TABLET ORAL ONCE AS NEEDED
Status: DISCONTINUED | OUTPATIENT
Start: 2017-12-06 | End: 2017-12-06 | Stop reason: HOSPADM

## 2017-12-06 RX ORDER — HYDROCODONE BITARTRATE AND ACETAMINOPHEN 7.5; 325 MG/1; MG/1
1 TABLET ORAL ONCE AS NEEDED
Status: COMPLETED | OUTPATIENT
Start: 2017-12-06 | End: 2017-12-06

## 2017-12-06 RX ORDER — SCOLOPAMINE TRANSDERMAL SYSTEM 1 MG/1
1 PATCH, EXTENDED RELEASE TRANSDERMAL ONCE
Status: DISCONTINUED | OUTPATIENT
Start: 2017-12-06 | End: 2017-12-06 | Stop reason: HOSPADM

## 2017-12-06 RX ORDER — GINSENG 100 MG
CAPSULE ORAL AS NEEDED
Status: DISCONTINUED | OUTPATIENT
Start: 2017-12-06 | End: 2017-12-06 | Stop reason: HOSPADM

## 2017-12-06 RX ORDER — PROMETHAZINE HYDROCHLORIDE 25 MG/ML
12.5 INJECTION, SOLUTION INTRAMUSCULAR; INTRAVENOUS ONCE AS NEEDED
Status: DISCONTINUED | OUTPATIENT
Start: 2017-12-06 | End: 2017-12-06 | Stop reason: HOSPADM

## 2017-12-06 RX ORDER — FENTANYL CITRATE 50 UG/ML
INJECTION, SOLUTION INTRAMUSCULAR; INTRAVENOUS AS NEEDED
Status: DISCONTINUED | OUTPATIENT
Start: 2017-12-06 | End: 2017-12-06 | Stop reason: SURG

## 2017-12-06 RX ORDER — ONDANSETRON 2 MG/ML
INJECTION INTRAMUSCULAR; INTRAVENOUS AS NEEDED
Status: DISCONTINUED | OUTPATIENT
Start: 2017-12-06 | End: 2017-12-06 | Stop reason: SURG

## 2017-12-06 RX ORDER — EPHEDRINE SULFATE 50 MG/ML
5 INJECTION, SOLUTION INTRAVENOUS ONCE AS NEEDED
Status: DISCONTINUED | OUTPATIENT
Start: 2017-12-06 | End: 2017-12-06 | Stop reason: HOSPADM

## 2017-12-06 RX ORDER — PROPOFOL 10 MG/ML
VIAL (ML) INTRAVENOUS AS NEEDED
Status: DISCONTINUED | OUTPATIENT
Start: 2017-12-06 | End: 2017-12-06 | Stop reason: SURG

## 2017-12-06 RX ORDER — LABETALOL HYDROCHLORIDE 5 MG/ML
5 INJECTION, SOLUTION INTRAVENOUS
Status: DISCONTINUED | OUTPATIENT
Start: 2017-12-06 | End: 2017-12-06 | Stop reason: HOSPADM

## 2017-12-06 RX ORDER — MIDAZOLAM HYDROCHLORIDE 1 MG/ML
1 INJECTION INTRAMUSCULAR; INTRAVENOUS
Status: DISCONTINUED | OUTPATIENT
Start: 2017-12-06 | End: 2017-12-06 | Stop reason: HOSPADM

## 2017-12-06 RX ORDER — FENTANYL CITRATE 50 UG/ML
50 INJECTION, SOLUTION INTRAMUSCULAR; INTRAVENOUS
Status: DISCONTINUED | OUTPATIENT
Start: 2017-12-06 | End: 2017-12-06 | Stop reason: HOSPADM

## 2017-12-06 RX ORDER — DEXAMETHASONE SODIUM PHOSPHATE 10 MG/ML
INJECTION INTRAMUSCULAR; INTRAVENOUS AS NEEDED
Status: DISCONTINUED | OUTPATIENT
Start: 2017-12-06 | End: 2017-12-06 | Stop reason: SURG

## 2017-12-06 RX ORDER — OXYCODONE HYDROCHLORIDE AND ACETAMINOPHEN 5; 325 MG/1; MG/1
1 TABLET ORAL EVERY 6 HOURS PRN
Qty: 14 TABLET | Refills: 0 | OUTPATIENT
Start: 2017-12-06 | End: 2018-03-30

## 2017-12-06 RX ORDER — LIDOCAINE HYDROCHLORIDE 20 MG/ML
INJECTION, SOLUTION INFILTRATION; PERINEURAL AS NEEDED
Status: DISCONTINUED | OUTPATIENT
Start: 2017-12-06 | End: 2017-12-06 | Stop reason: SURG

## 2017-12-06 RX ORDER — FAMOTIDINE 10 MG/ML
20 INJECTION, SOLUTION INTRAVENOUS ONCE
Status: COMPLETED | OUTPATIENT
Start: 2017-12-06 | End: 2017-12-06

## 2017-12-06 RX ORDER — IBUPROFEN 600 MG/1
600 TABLET ORAL EVERY 6 HOURS PRN
Qty: 18 TABLET | Refills: 1 | Status: SHIPPED | OUTPATIENT
Start: 2017-12-06 | End: 2018-04-17

## 2017-12-06 RX ORDER — PROMETHAZINE HYDROCHLORIDE 25 MG/1
12.5 TABLET ORAL ONCE AS NEEDED
Status: DISCONTINUED | OUTPATIENT
Start: 2017-12-06 | End: 2017-12-06 | Stop reason: HOSPADM

## 2017-12-06 RX ORDER — NALOXONE HCL 0.4 MG/ML
0.2 VIAL (ML) INJECTION AS NEEDED
Status: DISCONTINUED | OUTPATIENT
Start: 2017-12-06 | End: 2017-12-06 | Stop reason: HOSPADM

## 2017-12-06 RX ORDER — SODIUM CHLORIDE, SODIUM LACTATE, POTASSIUM CHLORIDE, CALCIUM CHLORIDE 600; 310; 30; 20 MG/100ML; MG/100ML; MG/100ML; MG/100ML
9 INJECTION, SOLUTION INTRAVENOUS CONTINUOUS
Status: DISCONTINUED | OUTPATIENT
Start: 2017-12-06 | End: 2017-12-06 | Stop reason: HOSPADM

## 2017-12-06 RX ADMIN — FAMOTIDINE 20 MG: 10 INJECTION, SOLUTION INTRAVENOUS at 14:04

## 2017-12-06 RX ADMIN — HYDROCODONE BITARTRATE AND ACETAMINOPHEN 1 TABLET: 7.5; 325 TABLET ORAL at 16:57

## 2017-12-06 RX ADMIN — SCOPALAMINE 1 PATCH: 1 PATCH, EXTENDED RELEASE TRANSDERMAL at 14:04

## 2017-12-06 RX ADMIN — Medication 2 MG: at 14:39

## 2017-12-06 RX ADMIN — FENTANYL CITRATE 50 MCG: 50 INJECTION INTRAMUSCULAR; INTRAVENOUS at 15:17

## 2017-12-06 RX ADMIN — KETOROLAC TROMETHAMINE 30 MG: 30 INJECTION, SOLUTION INTRAMUSCULAR; INTRAVENOUS at 15:24

## 2017-12-06 RX ADMIN — LIDOCAINE HYDROCHLORIDE 100 MG: 20 INJECTION, SOLUTION INFILTRATION; PERINEURAL at 15:17

## 2017-12-06 RX ADMIN — PROPOFOL 200 MG: 10 INJECTION, EMULSION INTRAVENOUS at 15:17

## 2017-12-06 RX ADMIN — FENTANYL CITRATE 50 MCG: 50 INJECTION INTRAMUSCULAR; INTRAVENOUS at 15:23

## 2017-12-06 RX ADMIN — ONDANSETRON 4 MG: 2 INJECTION INTRAMUSCULAR; INTRAVENOUS at 15:24

## 2017-12-06 RX ADMIN — DEXAMETHASONE SODIUM PHOSPHATE 8 MG: 10 INJECTION INTRAMUSCULAR; INTRAVENOUS at 15:22

## 2017-12-06 RX ADMIN — SODIUM CHLORIDE, POTASSIUM CHLORIDE, SODIUM LACTATE AND CALCIUM CHLORIDE 9 ML/HR: 600; 310; 30; 20 INJECTION, SOLUTION INTRAVENOUS at 13:43

## 2017-12-06 NOTE — ANESTHESIA PREPROCEDURE EVALUATION
Anesthesia Evaluation     Patient summary reviewed and Nursing notes reviewed   history of anesthetic complications: PONV  NPO Solid Status: > 8 hours  NPO Liquid Status: > 2 hours     Airway   Mallampati: II  TM distance: >3 FB  Neck ROM: full  Dental - normal exam     Pulmonary - negative pulmonary ROS and normal exam   Cardiovascular - normal exam    (+) hyperlipidemia      Neuro/Psych- negative ROS  GI/Hepatic/Renal/Endo    (+) obesity,  GERD,     Musculoskeletal (-) negative ROS    Abdominal    Substance History - negative use     OB/GYN negative ob/gyn ROS         Other - negative ROS                                     Anesthesia Plan    ASA 2     general     Anesthetic plan and risks discussed with patient.

## 2017-12-06 NOTE — OP NOTE
Subjective     Date of Service:  12/06/17 4:01 PM    Pre-operative diagnosis(es): Pre-Op Diagnosis Codes:     * Dyspareunia due to medical condition in female [N94.19]        Perineal lesion         Post-operative diagnosis(es): Post-Op Diagnosis Codes:     * Dyspareunia due to medical condition in female [N94.19]       Procedure(s): Procedure(s):  PERINEOPLASTY AND EXCISION OF PERINEAL LESION     Surgeon:  Assistant: Surgeon(s) and Role:     * Pino Ge MD - Primary  None      Anesthesia: Type: General     IV Fluid: mL       Output: Est. Blood Loss 3 mL  Urine Output under mL  Other Output 0 mL       Blood products used: No       Drains:            Specimens removed:   ID Type Source Tests Collected by Time Destination   A : perineal lesion Tissue Perineum TISSUE EXAM Pino Ge MD 12/6/2017 1532         Complications: None        Intraoperative consult(s):  None     Condition: stable       Disposition:   Home       Indications:    Implant Information: Nothing was implanted during the procedure      Assessment/Plan     Operative Findings: Small perineal lesion at the 6:00 location consistent with chronic inflammation and a small dictation of the external skin.    Procedure patient taken with OR after reviewing risks benefits and potential complications surgery. General anesthetic was given she was prepped and draped usual fashion. She is in dorsal lithotomy position. Bladder was drained after perineum and vagina were prepped and draped per protocol. EUA was performed. There was some laxity to the introitus. There was an area at 6:00 1/2 cm below the introitus where there were some skin indentation and what felt to be a small nodule. The perineum was infiltrated with some 2% Xylocaine. Purple surgery marker was utilized to outline the area of dissection. AV shaped incision was made starting 1 cm inside the introitus in the midline out to the 5 o'clock position and 7 o'clock position of the introitus. That  incision was then carried down in a V procedure on both sides to include the nodular lesion and to a length of approximately 1.1 cm. Metzenbaum scissors as well as scalpel were used to dissect out the perineal lesion. This Metzenbaum scissors were utilized to remove the vaginal mucosa that had been dissected. The underlying base had a couple bleeding spots were bovied. Then interrupted 2-0 chromic suture utilized to reapproximate the underlying tissue base. Everything was hemostatic. 4-0 Monocryl was used to close the vaginal mucosa as well as the perineal skin in a running subcuticular fashion. Everything was hemostatic. All counts were correct patient was awakened and taken recovery room in stable condition. The mucosa, skin and inflammatory nodule were all sent to pathology for further study.              Pino Ge MD  12/06/17  3:55 PM

## 2017-12-06 NOTE — H&P
[facility]  Echo Montelongo  : 1965  MRN: [id number]  CSN: [id number]    History and Physical  No chief complaint on file.    Subjective   Echo Montelongo is a 52 y.o. year old  who present for surgery due to persistent and worsening dyspareunia presumed to be secondary to perineal scarring from obstetrical delivery. Risk of bleeding infection injury to other organs as well as anesthetic complications and potential worsening of condition have been reviewed. This is scheduled initially as an outpatient procedure.    Past Medical History:   Diagnosis Date   • GERD (gastroesophageal reflux disease)    • High cholesterol     BORDERLINE NO MEDICINE   • History of reduction of orbital fracture     RIGHT   • PONV (postoperative nausea and vomiting)     SEVERE N/V   • Urinary incontinence      Past Surgical History:   Procedure Laterality Date   • ABDOMINOPLASTY     •  SECTION     • COLPOSCOPY W/ BIOPSY / CURETTAGE  2015    benign   • ENDOMETRIAL ABLATION     • ORBITAL FRACTURE OPEN REDUCTION INTERNAL FIXATION Right 10/18/2016    Procedure: REPAIR ORBITAL FLOOR FRACTURE;  Surgeon: Ernesto Cali MD;  Location: [facility];  Service:    • ORIF WRIST FRACTURE Right 10/18/2016    Procedure: RT ULNA/RADIUS OPEN REDUCTION INTERNAL FIXATION;  Surgeon: Karen Harris MD;  Location: [facility];  Service:      Smoking status: Never Smoker                                                              Smokeless status: Never Used                        No current facility-administered medications for this encounter.     Allergies   Allergen Reactions   • Statins Myalgia       Review of Systems      Except as outlined in history of physical illness, patient denies any changes in her GYN, , GI systems. All other systems reviewed are negative.        Objective   There were no vitals taken for this visit.  General: well developed; well nourished  no acute distress  appears stated  age   Heart: regular rate and rhythm, S1, S2 normal, no murmur, click, rub or gallop   Lungs: breathing is unlabored  clear to auscultation bilaterally   Abdomen: soft, non-tender; no masses  no umbilical or inginual hernias are present  no hepato-splenomegaly   Pelvis:: Clinical staff was present for exam  External genitalia shows normal female, there is a relaxed vaginal introitus, there is some perineal discomfort at 6 o'clock position with palpation. Vaginal epithelium seems adequately estrogenized cervix uterus adnexa normal rectal exams normal   Labs  Lab Results   Component Value Date     12/01/2017    HGB 14.4 12/01/2017    HCT 42.9 12/01/2017    WBC 3.90 (L) 12/01/2017     04/14/2017    K 4.3 04/14/2017     04/14/2017    CO2 25.7 04/14/2017    BUN 17 04/14/2017    CREATININE 0.83 04/14/2017    GLUCOSE 93 03/07/2014    ALBUMIN 4.40 04/14/2017    CALCIUM 10.0 04/14/2017    AST 32 04/14/2017    ALT 38 (H) 04/14/2017    BILITOT 0.4 04/14/2017        Assessment   1. Persistent and worsening dyspareunia  2. Relaxed vaginal introitus     Plan   1. Exam under anesthesia and perineorrhaphy with revision of previous repair and tears. Risks benefits alternatives potential complications reviewed    Pino Ge MD  12/6/2017  12:26 PM       EMR Dragon/ Transcription disclaimer:  Much of the encounter note is an electronic transcription/translation of spoken language to printed text. The electronic translation of spoken language may permit erroneous, or at times, nonessential words or phrases to be inadvertently transcribes; Although i have reviewed the note for such errors, some may still exist.

## 2017-12-06 NOTE — PLAN OF CARE
Problem: Patient Care Overview (Adult)  Goal: Plan of Care Review  Outcome: Ongoing (interventions implemented as appropriate)    12/06/17 1320   Coping/Psychosocial Response Interventions   Plan Of Care Reviewed With patient       Goal: Discharge Needs Assessment  Outcome: Ongoing (interventions implemented as appropriate)    12/06/17 1320   Discharge Needs Assessment   Concerns To Be Addressed denies needs/concerns at this time         Problem: Perioperative Period (Adult)  Goal: Signs and Symptoms of Listed Potential Problems Will be Absent or Manageable (Perioperative Period)  Outcome: Ongoing (interventions implemented as appropriate)    12/06/17 1320   Perioperative Period   Problems Assessed (Perioperative Period) all

## 2017-12-06 NOTE — PLAN OF CARE
Problem: Patient Care Overview (Adult)  Goal: Plan of Care Review  Outcome: Ongoing (interventions implemented as appropriate)    12/06/17 1628   Coping/Psychosocial Response Interventions   Plan Of Care Reviewed With patient   Patient Care Overview   Progress improving       Goal: Adult Individualization and Mutuality  Outcome: Ongoing (interventions implemented as appropriate)  Goal: Discharge Needs Assessment  Outcome: Ongoing (interventions implemented as appropriate)

## 2017-12-06 NOTE — ANESTHESIA POSTPROCEDURE EVALUATION
Patient: Echo Montelongo    Procedure Summary     Date Anesthesia Start Anesthesia Stop Room / Location    12/06/17 1513 1601  [facility]  /  [facility]       Procedure Diagnosis Surgeon Provider    PERINEOPLASTY AND EXCISION OF PERINEAL LESION (N/A Vagina) Dyspareunia due to medical condition in female  (Dyspareunia due to medical condition in female [N94.19]) MD Jan Cravalho MD          Anesthesia Type: general  Last vitals  BP   134/72 (12/06/17 1615)   Temp   36.3 °C (97.3 °F) (12/06/17 1600)   Pulse   81 (12/06/17 1615)   Resp   16 (12/06/17 1615)     SpO2   100 % (12/06/17 1615)     Post Anesthesia Care and Evaluation    Patient location during evaluation: bedside  Patient participation: complete - patient participated  Level of consciousness: awake  Pain score: 2  Pain management: adequate  Airway patency: patent  Anesthetic complications: No anesthetic complications  PONV Status: none  Cardiovascular status: acceptable  Respiratory status: acceptable  Hydration status: acceptable    Comments: /72  Pulse 81  Temp 36.3 °C (97.3 °F) (Temporal Artery )   Resp 16  Wt 90.7 kg (200 lb)  SpO2 100%  BMI 31.32 kg/m2

## 2017-12-06 NOTE — PLAN OF CARE
Problem: Perioperative Period (Adult)  Goal: Signs and Symptoms of Listed Potential Problems Will be Absent or Manageable (Perioperative Period)  Outcome: Ongoing (interventions implemented as appropriate)    12/06/17 9638   Perioperative Period   Problems Assessed (Perioperative Period) pain;wound complications;hemorrhage;hypothermia;hypoxia/hypoxemia   Problems Present (Perioperative Period) none

## 2017-12-11 LAB
CYTO UR: NORMAL
LAB AP CASE REPORT: NORMAL
Lab: NORMAL
PATH REPORT.FINAL DX SPEC: NORMAL
PATH REPORT.GROSS SPEC: NORMAL

## 2017-12-21 ENCOUNTER — TELEPHONE (OUTPATIENT)
Dept: OBSTETRICS AND GYNECOLOGY | Age: 52
End: 2017-12-21

## 2018-01-08 ENCOUNTER — TELEPHONE (OUTPATIENT)
Dept: OBSTETRICS AND GYNECOLOGY | Age: 53
End: 2018-01-08

## 2018-01-08 NOTE — TELEPHONE ENCOUNTER
Pt states feels fine, back to normal, does she need post-op tomorrow? Pt states has not been given ok for sex, but does she need to come in for that? Please advise.    Pt # [phone]

## 2018-01-09 ENCOUNTER — OFFICE VISIT (OUTPATIENT)
Dept: OBSTETRICS AND GYNECOLOGY | Age: 53
End: 2018-01-09

## 2018-01-09 VITALS
BODY MASS INDEX: 31.71 KG/M2 | WEIGHT: 202 LBS | DIASTOLIC BLOOD PRESSURE: 70 MMHG | HEIGHT: 67 IN | SYSTOLIC BLOOD PRESSURE: 124 MMHG

## 2018-01-09 DIAGNOSIS — Z09 POSTOP CHECK: ICD-10-CM

## 2018-01-09 DIAGNOSIS — Z12.39 SCREENING BREAST EXAMINATION: Primary | ICD-10-CM

## 2018-01-09 PROCEDURE — 99212 OFFICE O/P EST SF 10 MIN: CPT | Performed by: OBSTETRICS & GYNECOLOGY

## 2018-01-09 RX ORDER — SACCHAROMYCES BOULARDII 250 MG
250 CAPSULE ORAL 2 TIMES DAILY
COMMUNITY
End: 2020-02-12 | Stop reason: SDUPTHER

## 2018-01-09 NOTE — PROGRESS NOTES
Patient comes in following her perineorrhaphy. She had called yesterday asking if she needed to be seen because she felt so good. I advised her to come in. We reviewed the pathology report which was benign and her previous ECC.. He has had no significant bleeding or any abnormal discharge. She is engaged and mild to moderate activity but no heavy lifting strenuous exercise and has stayed at pelvic rest up until this point.  She is alert and oriented, abdomen is soft nontender extra genitalia is normal fortunately, her perineum and vagina has healed quite nicely. It's very difficult to even see scar lines. She has excellent muscle tone and excellent support. Previous hemorrhoids are unchanged and nonthrombosed.  Patient will slowly returned to pelvic activity and discussed a modified approach to full physical activity. She will follow-up for her annual examination and call with any proms questions concerns in the meantime

## 2018-01-18 ENCOUNTER — TELEPHONE (OUTPATIENT)
Dept: OBSTETRICS AND GYNECOLOGY | Age: 53
End: 2018-01-18

## 2018-01-22 ENCOUNTER — OFFICE VISIT (OUTPATIENT)
Dept: OBSTETRICS AND GYNECOLOGY | Age: 53
End: 2018-01-22

## 2018-01-22 VITALS
SYSTOLIC BLOOD PRESSURE: 120 MMHG | WEIGHT: 202 LBS | BODY MASS INDEX: 31.71 KG/M2 | HEIGHT: 67 IN | DIASTOLIC BLOOD PRESSURE: 78 MMHG

## 2018-01-22 DIAGNOSIS — Z09 POSTOP CHECK: Primary | ICD-10-CM

## 2018-01-22 PROCEDURE — 99212 OFFICE O/P EST SF 10 MIN: CPT | Performed by: OBSTETRICS & GYNECOLOGY

## 2018-01-22 RX ORDER — ESTRADIOL 0.1 MG/G
1 CREAM VAGINAL DAILY
Qty: 42.5 G | Refills: 3 | Status: SHIPPED | OUTPATIENT
Start: 2018-01-22 | End: 2018-04-17

## 2018-01-22 NOTE — ANESTHESIA PROCEDURE NOTES
Airway  Urgency: elective    Airway not difficult    General Information and Staff    Patient location during procedure: OR  Anesthesiologist: ISIAH WILSON  CRNA: NATHAN JIMENEZ    Indications and Patient Condition  Indications for airway management: airway protection    Preoxygenated: yes  MILS not maintained throughout  Mask difficulty assessment: 1 - vent by mask    Final Airway Details  Final airway type: supraglottic airway      Successful airway: classic  Size 4    Number of attempts at approach: 1    Additional Comments  Inflated to seal.             confused/alert

## 2018-01-22 NOTE — PROGRESS NOTES
Subjective     Chief Complaint   Patient presents with   • Gynecologic Exam     ? Vaginal tear       History of Present Illness  Echo Montelongo is a 52 y.o. female is being seen today for Reexamination of her perineum. Patient underwent a perineorrhaphy and had done extremely well. She was seen recently and had normal postoperative findings. She apparently underwent some type of revision on plastic surgery she had on her thighs and is in compression garments. She was concerned that she might have a vaginal tear or some suture present and called and wanted to be seen. She is afebrile. She is not allowed to have pelvic activity secondary to her plastic surgery for several weeks. Her bowel movements are normal she is voiding normal.  Chief Complaint   Patient presents with   • Gynecologic Exam     ? Vaginal tear   .        The following portions of the patient's history were reviewed and updated as appropriate: allergies, current medications, past family history, past medical history, past social history, past surgical history and problem list.    PAST MEDICAL HISTORY  Past Medical History:   Diagnosis Date   • GERD (gastroesophageal reflux disease)    • High cholesterol     BORDERLINE NO MEDICINE   • History of reduction of orbital fracture     RIGHT   • PONV (postoperative nausea and vomiting)     SEVERE N/V   • Urinary incontinence      OB History      Para Term  AB Living    2 2 1 1  2    SAB TAB Ectopic Multiple Live Births        2        Past Surgical History:   Procedure Laterality Date   • ABDOMINOPLASTY     • ANTERIOR AND POSTERIOR VAGINAL REPAIR N/A 2017    Procedure: PERINEOPLASTY AND EXCISION OF PERINEAL LESION;  Surgeon: Pino Ge MD;  [facility];  Service:    •  SECTION     • COLPOSCOPY W/ BIOPSY / CURETTAGE  2015    benign   • ENDOMETRIAL ABLATION     • ORBITAL FRACTURE OPEN REDUCTION INTERNAL FIXATION Right 10/18/2016    Procedure: REPAIR ORBITAL  FLOOR FRACTURE;  Surgeon: Ernesto Cali MD;  Location: [facility] MAIN OR;  Service:    • ORIF WRIST FRACTURE Right 10/18/2016    Procedure: RT ULNA/RADIUS OPEN REDUCTION INTERNAL FIXATION;  Surgeon: Karen Harris MD;  Location: [facility] MAIN OR;  Service:    • PERINEOPLASTY       Family History   Problem Relation Age of Onset   • Alcohol abuse Mother    • Cerebral aneurysm Father    • Alcohol abuse Father    • Alcohol abuse Maternal Grandmother    • Alcohol abuse Paternal Grandmother    • Alcohol abuse Paternal Grandfather    • Malig Hyperthermia Neg Hx      History   Smoking Status   • Never Smoker   Smokeless Tobacco   • Never Used       Current Outpatient Prescriptions:   •  cefdinir (OMNICEF) 300 MG capsule, Take 1 capsule by mouth 2 (Two) Times a Day., Disp: 20 capsule, Rfl: 0  •  fluticasone (FLONASE) 50 MCG/ACT nasal spray, 2 sprays into each nostril Daily., Disp: 15.8 mL, Rfl: 0  •  GuaiFENesin (MUCINEX PO), Take  by mouth., Disp: , Rfl:   •  Multiple Vitamin (MULTI-VITAMIN DAILY PO), Take  by mouth., Disp: , Rfl:   •  omeprazole (priLOSEC) 20 MG capsule, Take 20 mg by mouth Daily., Disp: , Rfl:   •  fluconazole (DIFLUCAN) 150 MG tablet, TAKE ONE TABLET BY MOUTH NOW AND REPEAT IN 3 DAYS, Disp: 2 tablet, Rfl: 0  •  ibuprofen (ADVIL,MOTRIN) 600 MG tablet, Take 1 tablet by mouth Every 6 (Six) Hours As Needed for Mild Pain ., Disp: 18 tablet, Rfl: 1  •  oxyCODONE-acetaminophen (PERCOCET) 5-325 MG per tablet, Take 1 tablet by mouth Every 6 (Six) Hours As Needed for Moderate Pain ., Disp: 14 tablet, Rfl: 0  •  pseudoephedrine (SUDAFED) 60 MG tablet, Take 60 mg by mouth Every 4 (Four) Hours As Needed for Congestion., Disp: , Rfl:   •  saccharomyces boulardii (FLORASTOR) 250 MG capsule, Take 250 mg by mouth 2 (Two) Times a Day., Disp: , Rfl:   Immunization History   Administered Date(s) Administered   • Influenza, Quadrivalent 09/20/2013, 11/01/2015   • Tdap 01/31/2014       Review of Systems        Except as outlined in history of physical illness, patient denies any changes in her GYN, , GI systems. All other systems reviewed are negative.    Objective   Physical Exam   Alert and oriented, respirations unlabored, heart regular rate and rhythm   PelvicExternal genitalia normal perineum is healed extremely well. Can barely see any type of suture lines. There are no sutures. The vaginal vault is normal. When she does occasional exercise she had excellent muscle tone. Rectal exam is normal as well.      Assessment/Plan      No evidence of any type of vaginal tear laceration.  Patient recently had a revision of her plastic surgery on her thighs.  She did not use extra vaginal lubricant as we suggested at our last examination. I have strongly encouraged her to do that when she resumes relations. Also talked about the possibility of utilizing a small amount of estrogen cream to the introitus to further build up her vaginal mucosa for a couple weeks' time. She thinks she would like to do that. She will call with any proms questions concerns.  There are no diagnoses linked to this encounter.             EMR Dragon/ Transcription disclaimer:  Much of the encounter note is an electronic transcription/translation of spoken language to printed text. The electronic translation of spoken language may permit erroneous, or at times, nonessential words or phrases to be inadvertently transcribes; Although i have reviewed the note for such errors, some may still exist.

## 2018-03-29 ENCOUNTER — HOSPITAL ENCOUNTER (OUTPATIENT)
Dept: MAMMOGRAPHY | Facility: HOSPITAL | Age: 53
Discharge: HOME | End: 2018-03-29
Attending: OBSTETRICS & GYNECOLOGY | Admitting: OBSTETRICS & GYNECOLOGY

## 2018-03-29 DIAGNOSIS — Z12.39 SCREENING BREAST EXAMINATION: ICD-10-CM

## 2018-03-29 DIAGNOSIS — N63.0 BREAST MASS: ICD-10-CM

## 2018-03-29 PROCEDURE — 77066 DX MAMMO INCL CAD BI: CPT

## 2018-03-29 PROCEDURE — G0279 TOMOSYNTHESIS, MAMMO: HCPCS

## 2018-03-30 ENCOUNTER — OFFICE VISIT (OUTPATIENT)
Dept: OBSTETRICS AND GYNECOLOGY | Age: 53
End: 2018-03-30

## 2018-03-30 VITALS
BODY MASS INDEX: 31.55 KG/M2 | DIASTOLIC BLOOD PRESSURE: 70 MMHG | WEIGHT: 201 LBS | SYSTOLIC BLOOD PRESSURE: 122 MMHG | HEIGHT: 67 IN

## 2018-03-30 DIAGNOSIS — N64.9 NIPPLE LESION: Primary | ICD-10-CM

## 2018-03-30 PROCEDURE — 99213 OFFICE O/P EST LOW 20 MIN: CPT | Performed by: PHYSICIAN ASSISTANT

## 2018-03-30 RX ORDER — METRONIDAZOLE 7.5 MG/G
1 GEL TOPICAL
COMMUNITY
End: 2020-02-12

## 2018-03-30 NOTE — PROGRESS NOTES
"Subjective     Chief Complaint   Patient presents with   • Breast Problem     c/o spot on right breast       Echo Montelongo is a 52 y.o.  whose LMP is No LMP recorded. Patient is postmenopausal. presents with a \"spot\" on her nipple. She saw one initially and then a few more popped up.  She freaked out b/c she looked it up and saw that it could be Paget's dz.  She did do her mammogram earlier this week and it was normal.      She is also noting B eye burning and is unsure if the 2 sx's are related. 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      She is a pt of dr hawk.       No Additional Complaints Reported    The following portions of the patient's history were reviewed and updated as appropriate:vital signs, allergies, current medications, past family history, past medical history, past social history, past surgical history and problem list      Review of Systems   A comprehensive review of systems was negative except for: Integument/breast: positive for skin lesion(s)     Objective      /70   Ht 170.2 cm (67\")   Wt 91.2 kg (201 lb)   BMI 31.48 kg/m²     Physical Exam    General:   alert, comfortable and no distress   Heart: Not performed today   Lungs: Not performed today.   Breast: No axillary or supraclavicular adenopathy, Normal to palpation without dominant masses, lesion noted at tip of nipple and 2 around nipple, small, crusty appearing but no drainage and no pain. no erythema noted   Neck: na   Abdomen: {Not performed today   CVA: Not performed today   Pelvis: Not performed today   Extremities: Not performed today   Neurologic: negative   Psychiatric: Normal affect, judgement, and mood       Lab Review   Labs: No data reviewed     Imaging   No data reviewed    Assessment/Plan     ASSESSMENT  1. Nipple lesion        PLAN  1. Suspect an isolated cellulitis.  Possibly impetigo, or worse case MRSA.  She has mupirocin at home, plan to aaa. She " had reassuring results from her mammogram so we will plan a conservative approach.  If her sx's don't improve/ or worsen, plan f/u either here or with Derm. Could also consider an oral antibiotic but feel as if that is overkill at this point.      2. Medications prescribed this encounter:        New Medications Ordered This Visit   Medications   • metroNIDAZOLE (METROGEL) 0.75 % gel     Sig: Apply 1 application topically.           Follow up: prn     MAURICE Dinh  3/30/2018

## 2018-04-17 ENCOUNTER — TELEPHONE (OUTPATIENT)
Dept: INTERNAL MEDICINE | Facility: CLINIC | Age: 53
End: 2018-04-17

## 2018-04-17 ENCOUNTER — OFFICE VISIT (OUTPATIENT)
Dept: INTERNAL MEDICINE | Facility: CLINIC | Age: 53
End: 2018-04-17

## 2018-04-17 VITALS
OXYGEN SATURATION: 94 % | HEIGHT: 67 IN | RESPIRATION RATE: 16 BRPM | SYSTOLIC BLOOD PRESSURE: 110 MMHG | WEIGHT: 203.6 LBS | DIASTOLIC BLOOD PRESSURE: 68 MMHG | TEMPERATURE: 98.7 F | BODY MASS INDEX: 31.96 KG/M2 | HEART RATE: 104 BPM

## 2018-04-17 DIAGNOSIS — E78.5 HYPERLIPIDEMIA, UNSPECIFIED HYPERLIPIDEMIA TYPE: Primary | ICD-10-CM

## 2018-04-17 DIAGNOSIS — E78.2 MIXED HYPERLIPIDEMIA: ICD-10-CM

## 2018-04-17 DIAGNOSIS — K21.9 GASTROESOPHAGEAL REFLUX DISEASE WITHOUT ESOPHAGITIS: Primary | ICD-10-CM

## 2018-04-17 DIAGNOSIS — K21.9 GASTROESOPHAGEAL REFLUX DISEASE WITHOUT ESOPHAGITIS: ICD-10-CM

## 2018-04-17 PROBLEM — K80.20 CALCULUS OF GALLBLADDER WITHOUT CHOLECYSTITIS: Status: ACTIVE | Noted: 2018-04-17

## 2018-04-17 PROBLEM — N20.0 CALCULUS OF KIDNEY: Status: ACTIVE | Noted: 2018-04-17

## 2018-04-17 PROCEDURE — 99214 OFFICE O/P EST MOD 30 MIN: CPT | Performed by: INTERNAL MEDICINE

## 2018-04-17 RX ORDER — PRAVASTATIN SODIUM 20 MG
10 TABLET ORAL DAILY
Qty: 45 TABLET | Refills: 1 | Status: SHIPPED | OUTPATIENT
Start: 2018-04-17 | End: 2020-02-12

## 2018-04-17 NOTE — PROGRESS NOTES
Subjective   Echo Montelongo is a 52 y.o. female.     Chief Complaint   Patient presents with   • Abdominal Pain   • Heartburn         Abdominal Pain   This is a chronic problem. The current episode started more than 1 year ago. The onset quality is gradual. The problem occurs daily. The problem has been gradually worsening. The pain is moderate. The quality of the pain is dull. Associated symptoms include nausea. Pertinent negatives include no fever or vomiting. Nothing aggravates the pain. She has tried antacids for the symptoms. The treatment provided no relief.   Hyperlipidemia   This is a chronic problem. The current episode started more than 1 year ago. The problem is uncontrolled. Recent lipid tests were reviewed and are high. There are no known factors aggravating her hyperlipidemia. Associated symptoms include chest pain. Pertinent negatives include no shortness of breath. She is currently on no antihyperlipidemic treatment.        The following portions of the patient's history were reviewed and updated as appropriate: allergies, current medications, past social history and problem list.    Outpatient Prescriptions Marked as Taking for the 4/17/18 encounter (Office Visit) with Frandy Mejia MD   Medication Sig Dispense Refill   • metroNIDAZOLE (METROGEL) 0.75 % gel Apply 1 application topically.     • Multiple Vitamin (MULTI-VITAMIN DAILY PO) Take  by mouth.         Review of Systems   Constitutional: Negative for chills and fever.   Respiratory: Negative for shortness of breath and wheezing.    Cardiovascular: Positive for chest pain. Negative for palpitations.   Gastrointestinal: Positive for abdominal pain and nausea. Negative for vomiting.       Objective   Vitals:    04/17/18 1528   BP: 110/68   Pulse: 104   Resp: 16   Temp: 98.7 °F (37.1 °C)   SpO2: 94%      1    04/17/18  1528   Weight: 92.4 kg (203 lb 9.6 oz)    [unfilled]  Body mass index is 31.88 kg/m².      Physical Exam   Constitutional: She  appears well-developed and well-nourished. No distress.   HENT:   Head: Normocephalic and atraumatic.   Neck: Carotid bruit is not present. No thyromegaly present.   Cardiovascular: Normal rate, regular rhythm, normal heart sounds and intact distal pulses.  Exam reveals no gallop.    No murmur heard.  Pulmonary/Chest: Effort normal and breath sounds normal. No respiratory distress. She has no wheezes. She has no rales.   Abdominal: Soft. Bowel sounds are normal. She exhibits no mass. There is no tenderness. There is no guarding.         Problem List Items Addressed This Visit        Digestive    GERD (gastroesophageal reflux disease) - Primary      Other Visit Diagnoses    None.       Assessment/Plan   In with a variety of complaints today.  She has fatigue.  She has worsening heartburn.  He's had that before.  Seems like it's getting worse.  Worse if she eats late at night.  She has pain that goes up her chest and into the neck and shoulders.  Sometimes right lower quadrant.  She thinks she has gallstones.  I cannot find a record of the x-ray.  No one in the hiatal hernia.  She's had hyperlipidemia but could not tolerate statin therapy in the past.  We'll need to check some lab work today including CBC, CMP, lipids.  She's back on Prilosec and we'll see if that clears up her heartburn.  Has not going To give some serious thought to having her gallbladder removed since she has no one gallstones.  We'll discuss further once her lab work and lipids are back.  She's failed multiple statins in the past including with concurrent co Q 10 use.  She's had some symptoms suggestive of mid esophageal obstruction.  We'll schedule a barium esophagogram if she fails to respond to omeprazole.  Usually omeprazole clears of symptoms up.  Begin pravastatin 10 mg daily and see if she tolerates it.         Dragon disclaimer:   Much of this encounter note is an electronic transcription/translation of spoken language to printed text. The  electronic translation of spoken language may permit erroneous, or at times, nonsensical words or phrases to be inadvertently transcribed; Although I have reviewed the note for such errors, some may still exist.

## 2018-04-18 LAB
ALBUMIN SERPL-MCNC: 4.6 G/DL (ref 3.5–5.2)
ALBUMIN/GLOB SERPL: 1.6 G/DL
ALP SERPL-CCNC: 114 U/L (ref 39–117)
ALT SERPL-CCNC: 33 U/L (ref 1–33)
AST SERPL-CCNC: 25 U/L (ref 1–32)
BASOPHILS # BLD AUTO: 0.05 10*3/MM3 (ref 0–0.2)
BASOPHILS NFR BLD AUTO: 0.7 % (ref 0–1.5)
BILIRUB SERPL-MCNC: 0.2 MG/DL (ref 0.1–1.2)
BUN SERPL-MCNC: 22 MG/DL (ref 6–20)
BUN/CREAT SERPL: 26.8 (ref 7–25)
CALCIUM SERPL-MCNC: 9.7 MG/DL (ref 8.6–10.5)
CHLORIDE SERPL-SCNC: 100 MMOL/L (ref 98–107)
CHOLEST SERPL-MCNC: 316 MG/DL (ref 0–200)
CO2 SERPL-SCNC: 25 MMOL/L (ref 22–29)
CREAT SERPL-MCNC: 0.82 MG/DL (ref 0.57–1)
EOSINOPHIL # BLD AUTO: 0.4 10*3/MM3 (ref 0–0.7)
EOSINOPHIL NFR BLD AUTO: 5.5 % (ref 0.3–6.2)
ERYTHROCYTE [DISTWIDTH] IN BLOOD BY AUTOMATED COUNT: 12.9 % (ref 11.7–13)
GFR SERPLBLD CREATININE-BSD FMLA CKD-EPI: 73 ML/MIN/1.73
GFR SERPLBLD CREATININE-BSD FMLA CKD-EPI: 89 ML/MIN/1.73
GLOBULIN SER CALC-MCNC: 2.8 GM/DL
GLUCOSE SERPL-MCNC: 111 MG/DL (ref 65–99)
HCT VFR BLD AUTO: 44.2 % (ref 35.6–45.5)
HDLC SERPL-MCNC: 46 MG/DL (ref 40–60)
HGB BLD-MCNC: 14.6 G/DL (ref 11.9–15.5)
IMM GRANULOCYTES # BLD: 0 10*3/MM3 (ref 0–0.03)
IMM GRANULOCYTES NFR BLD: 0 % (ref 0–0.5)
LDLC SERPL CALC-MCNC: ABNORMAL MG/DL
LYMPHOCYTES # BLD AUTO: 2.87 10*3/MM3 (ref 0.9–4.8)
LYMPHOCYTES NFR BLD AUTO: 39.8 % (ref 19.6–45.3)
MCH RBC QN AUTO: 30.5 PG (ref 26.9–32)
MCHC RBC AUTO-ENTMCNC: 33 G/DL (ref 32.4–36.3)
MCV RBC AUTO: 92.5 FL (ref 80.5–98.2)
MONOCYTES # BLD AUTO: 0.38 10*3/MM3 (ref 0.2–1.2)
MONOCYTES NFR BLD AUTO: 5.3 % (ref 5–12)
NEUTROPHILS # BLD AUTO: 3.51 10*3/MM3 (ref 1.9–8.1)
NEUTROPHILS NFR BLD AUTO: 48.7 % (ref 42.7–76)
PLATELET # BLD AUTO: 256 10*3/MM3 (ref 140–500)
POTASSIUM SERPL-SCNC: 4.3 MMOL/L (ref 3.5–5.2)
PROT SERPL-MCNC: 7.4 G/DL (ref 6–8.5)
RBC # BLD AUTO: 4.78 10*6/MM3 (ref 3.9–5.2)
SODIUM SERPL-SCNC: 141 MMOL/L (ref 136–145)
TRIGL SERPL-MCNC: 440 MG/DL (ref 0–150)
VLDLC SERPL CALC-MCNC: ABNORMAL MG/DL
WBC # BLD AUTO: 7.21 10*3/MM3 (ref 4.5–10.7)

## 2018-05-31 ENCOUNTER — TRANSCRIBE ORDERS (OUTPATIENT)
Dept: ADMINISTRATIVE | Facility: HOSPITAL | Age: 53
End: 2018-05-31

## 2018-05-31 DIAGNOSIS — E03.8 OTHER SPECIFIED HYPOTHYROIDISM: Primary | ICD-10-CM

## 2018-05-31 DIAGNOSIS — Z78.0 MENOPAUSE: ICD-10-CM

## 2018-06-07 ENCOUNTER — HOSPITAL ENCOUNTER (OUTPATIENT)
Dept: BONE DENSITY | Facility: HOSPITAL | Age: 53
Discharge: HOME | End: 2018-06-07

## 2018-06-07 ENCOUNTER — HOSPITAL ENCOUNTER (OUTPATIENT)
Dept: ULTRASOUND IMAGING | Facility: HOSPITAL | Age: 53
Discharge: HOME | End: 2018-06-07
Admitting: EMERGENCY MEDICINE

## 2018-06-07 DIAGNOSIS — E03.8 OTHER SPECIFIED HYPOTHYROIDISM: ICD-10-CM

## 2018-06-07 DIAGNOSIS — Z78.0 MENOPAUSE: ICD-10-CM

## 2018-06-07 PROCEDURE — 76536 US EXAM OF HEAD AND NECK: CPT

## 2018-06-07 PROCEDURE — 77080 DXA BONE DENSITY AXIAL: CPT

## 2018-07-11 RX ORDER — FLUCONAZOLE 150 MG/1
TABLET ORAL
Qty: 2 TABLET | Refills: 0 | Status: SHIPPED | OUTPATIENT
Start: 2018-07-11 | End: 2018-10-27 | Stop reason: SDUPTHER

## 2018-07-11 NOTE — TELEPHONE ENCOUNTER
Pts  just got dx with a yeast infection and was treated now pt wants a diflucan called in so they arent passing this again and again.

## 2018-10-29 ENCOUNTER — TELEPHONE (OUTPATIENT)
Dept: OBSTETRICS AND GYNECOLOGY | Age: 53
End: 2018-10-29

## 2018-10-29 RX ORDER — FLUCONAZOLE 150 MG/1
TABLET ORAL
Qty: 2 TABLET | Refills: 0 | Status: SHIPPED | OUTPATIENT
Start: 2018-10-29 | End: 2020-12-18 | Stop reason: SDUPTHER

## 2018-11-21 ENCOUNTER — TELEPHONE (OUTPATIENT)
Dept: OBSTETRICS AND GYNECOLOGY | Age: 53
End: 2018-11-21

## 2018-11-21 NOTE — TELEPHONE ENCOUNTER
Dr Ge pt believed she had a yeast inf end of Oct we Rx'd Diflucan, pt was feeling better went on vacation in [place], then started feeling horrible went to some clinic they dx her with UTI  Rx cefuroxime (Zinadol) now it has been 7-8 days since last taken and starting to feel bad again, exp burning with urination, no frequency, urgency, no fever, no low back pain,  also exp a white d/c no itch. Pt states she is having prob with chronic UTI has had 3 a year for couple years. Please advise

## 2018-11-21 NOTE — TELEPHONE ENCOUNTER
Sorry to hear about recurrent urinary tract infections. With the frequency she describes patient would be best to evaluate with urology. Please give patient a number first u[facility] and have her schedule appointment she might very well need a cystoscopy to further evaluate

## 2018-12-12 ENCOUNTER — TRANSCRIBE ORDERS (OUTPATIENT)
Dept: ADMINISTRATIVE | Facility: HOSPITAL | Age: 53
End: 2018-12-12

## 2018-12-12 DIAGNOSIS — R10.32 ACUTE LEFT LOWER QUADRANT PAIN: Primary | ICD-10-CM

## 2018-12-20 ENCOUNTER — HOSPITAL ENCOUNTER (OUTPATIENT)
Dept: CT IMAGING | Facility: HOSPITAL | Age: 53
Discharge: HOME | End: 2018-12-20
Attending: UROLOGY | Admitting: UROLOGY

## 2018-12-20 DIAGNOSIS — R10.32 ACUTE LEFT LOWER QUADRANT PAIN: ICD-10-CM

## 2018-12-20 PROCEDURE — 74176 CT ABD & PELVIS W/O CONTRAST: CPT

## 2018-12-24 ENCOUNTER — TRANSCRIBE ORDERS (OUTPATIENT)
Dept: ADMINISTRATIVE | Facility: HOSPITAL | Age: 53
End: 2018-12-24

## 2019-08-27 ENCOUNTER — TELEPHONE (OUTPATIENT)
Dept: INTERNAL MEDICINE | Facility: CLINIC | Age: 54
End: 2019-08-27

## 2019-08-27 RX ORDER — SCOLOPAMINE TRANSDERMAL SYSTEM 1 MG/1
1 PATCH, EXTENDED RELEASE TRANSDERMAL
Qty: 2 PATCH | Refills: 0 | Status: SHIPPED | OUTPATIENT
Start: 2019-08-27 | End: 2020-02-12

## 2019-12-19 ENCOUNTER — TELEPHONE (OUTPATIENT)
Dept: OBSTETRICS AND GYNECOLOGY | Age: 54
End: 2019-12-19

## 2019-12-19 ENCOUNTER — CLINICAL SUPPORT (OUTPATIENT)
Dept: OBSTETRICS AND GYNECOLOGY | Age: 54
End: 2019-12-19

## 2019-12-19 DIAGNOSIS — R30.0 BURNING WITH URINATION: ICD-10-CM

## 2019-12-19 DIAGNOSIS — Z13.89 SCREENING FOR HEMATURIA OR PROTEINURIA: Primary | ICD-10-CM

## 2019-12-19 LAB
BILIRUB BLD-MCNC: NEGATIVE MG/DL
CLARITY, POC: CLEAR
COLOR UR: NORMAL
GLUCOSE UR STRIP-MCNC: NEGATIVE MG/DL
KETONES UR QL: NEGATIVE
LEUKOCYTE EST, POC: NEGATIVE
NITRITE UR-MCNC: NEGATIVE MG/ML
PH UR: 6 [PH] (ref 5–8)
PROT UR STRIP-MCNC: NEGATIVE MG/DL
RBC # UR STRIP: NEGATIVE /UL
SP GR UR: 1.03 (ref 1–1.03)
UROBILINOGEN UR QL: NORMAL

## 2019-12-19 PROCEDURE — 81002 URINALYSIS NONAUTO W/O SCOPE: CPT | Performed by: OBSTETRICS & GYNECOLOGY

## 2019-12-19 RX ORDER — NITROFURANTOIN 25; 75 MG/1; MG/1
100 CAPSULE ORAL 2 TIMES DAILY
Qty: 14 CAPSULE | Refills: 0 | Status: SHIPPED | OUTPATIENT
Start: 2019-12-19 | End: 2019-12-26

## 2019-12-19 NOTE — TELEPHONE ENCOUNTER
Dr. Ge pt.   Urine culture sent   POC UA = NORMAL    Echo reports burning with urination and hx of recurrent UTIs.  She is going out of town tomorrow.  If possible please call in script today.  Pharmacy on file.

## 2019-12-19 NOTE — TELEPHONE ENCOUNTER
I sent in macrobid. But pt was advised to f/u with Urology previously so I would suggest that she do that if she hasn't already. Also, pt is overdue for her annual exam and needs to schedule that

## 2019-12-19 NOTE — TELEPHONE ENCOUNTER
Dr hawk pt believes she has a UTI, just around the corner wants to come in to give UA  Her  name is now William.

## 2019-12-21 LAB
BACTERIA UR CULT: NO GROWTH
BACTERIA UR CULT: NORMAL

## 2020-02-12 ENCOUNTER — OFFICE VISIT (OUTPATIENT)
Dept: OBSTETRICS AND GYNECOLOGY | Age: 55
End: 2020-02-12

## 2020-02-12 VITALS
WEIGHT: 178 LBS | BODY MASS INDEX: 27.94 KG/M2 | DIASTOLIC BLOOD PRESSURE: 70 MMHG | HEIGHT: 67 IN | SYSTOLIC BLOOD PRESSURE: 118 MMHG

## 2020-02-12 DIAGNOSIS — Z12.31 OTHER SCREENING MAMMOGRAM: ICD-10-CM

## 2020-02-12 DIAGNOSIS — Z01.419 WELL WOMAN EXAM WITH ROUTINE GYNECOLOGICAL EXAM: Primary | ICD-10-CM

## 2020-02-12 DIAGNOSIS — N39.0 RECURRENT UTI: ICD-10-CM

## 2020-02-12 DIAGNOSIS — B97.7 HPV IN FEMALE: ICD-10-CM

## 2020-02-12 LAB
DEVELOPER EXPIRATION DATE: NORMAL
DEVELOPER LOT NUMBER: NORMAL
EXPIRATION DATE: NORMAL
FECAL OCCULT BLOOD SCREEN, POC: NEGATIVE
Lab: 1981
NEGATIVE CONTROL: NEGATIVE
POSITIVE CONTROL: POSITIVE

## 2020-02-12 PROCEDURE — 99396 PREV VISIT EST AGE 40-64: CPT | Performed by: PHYSICIAN ASSISTANT

## 2020-02-12 PROCEDURE — G0328 FECAL BLOOD SCRN IMMUNOASSAY: HCPCS | Performed by: PHYSICIAN ASSISTANT

## 2020-02-12 RX ORDER — NITROFURANTOIN 25; 75 MG/1; MG/1
100 CAPSULE ORAL 2 TIMES DAILY
COMMUNITY
End: 2022-01-24

## 2020-02-12 RX ORDER — SACCHAROMYCES BOULARDII 250 MG
250 CAPSULE ORAL 2 TIMES DAILY
Qty: 60 CAPSULE | Refills: 3 | Status: SHIPPED | OUTPATIENT
Start: 2020-02-12

## 2020-02-12 RX ORDER — ESTRADIOL 0.1 MG/G
CREAM VAGINAL
Qty: 42.5 G | Refills: 2 | Status: SHIPPED | OUTPATIENT
Start: 2020-02-12 | End: 2020-08-20

## 2020-02-12 NOTE — PROGRESS NOTES
"Subjective     Chief Complaint   Patient presents with   • Gynecologic Exam     annual exam. last pap 2017 LGSIL/+HPV, colpo 2017 normal, m/g 2018 neg, dexa 2018 normal       History of Present Illness    Echo Thomas is a 54 y.o.  who presents for annual exam.    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 Patient is postmenopausal.         Current contraception: post menopausal status  History of abnormal Pap smear: yes - lgsil  Received Gardasil immunization: no  Perform regular self breast exam: yes - occl  Family history of uterine or ovarian cancer: no  Family History of colon cancer: no  Family history of breast cancer: no    Mammogram: ordered.  Colonoscopy: up to date.  DEXA: up to date.    Exercise: moderately active  Calcium/Vitamin D: adequate intake    The following portions of the patient's history were reviewed and updated as appropriate: allergies, current medications, past family history, past medical history, past social history, past surgical history and problem list.    Review of Systems   Genitourinary:        Recurrent uti   All other systems reviewed and are negative.      Review of Systems   Constitutional: Negative for fatigue.   Respiratory: Negative for shortness " "of breath.    Gastrointestinal: Negative for abdominal pain.   Genitourinary: Negative for dysuria.   Neurological: Negative for headaches.   Psychiatric/Behavioral: Negative for dysphoric mood.         Objective   Physical Exam    /70   Ht 170.2 cm (67\")   Wt 80.7 kg (178 lb)   Breastfeeding No   BMI 27.88 kg/m²   General:   alert, comfortable and no distress   Heart: regular rate and rhythm   Lungs: clear to auscultation bilaterally   Breast: normal appearance, no masses or tenderness, Inspection negative, No nipple retraction or dimpling, No nipple discharge or bleeding, No axillary or supraclavicular adenopathy, Normal to palpation without dominant masses   Neck: no adenopathy and no carotid bruit   Abdomen: {normal findings: soft, non-tender   CVA: Not performed today   Pelvis: External genitalia: normal general appearance  Vaginal: normal mucosa without prolapse or lesions and atrophic mucosa  Cervix: normal appearance  Adnexa: normal bimanual exam  Uterus: normal single, nontender  Rectal: good sphincter tone, no masses, guaiac negative   Extremities: Not performed today   Neurologic: negative   Psychiatric: Normal affect, judgement, and mood     Assessment/Plan   Echo was seen today for gynecologic exam.    Diagnoses and all orders for this visit:    Well woman exam with routine gynecological exam  -     IGP, Aptima HPV, Rfx 16 / 18,45    HPV in female  -     IGP, Aptima HPV, Rfx 16 / 18,45    Recurrent UTI    Other screening mammogram  -     Mammo Screening Digital Tomosynthesis Bilateral With CAD    Other orders  -     estradiol (ESTRACE VAGINAL) 0.1 MG/GM vaginal cream; .5 gms pv at introitus 3 times a week  -     saccharomyces boulardii (FLORASTOR) 250 MG capsule; Take 1 capsule by mouth 2 (Two) Times a Day.        All questions answered.  Breast self exam technique reviewed and patient encouraged to perform self-exam monthly.  Discussed healthy lifestyle modifications.  Recommended 30 minutes " of aerobic exercise five times per week.  Discussed calcium needs to prevent osteoporosis.      Pap done  Mammogram ordered  Start estrace, daily x 2 wks, then 3 times a week  C/w macrobid prn IC  Refilled probiotic

## 2020-02-17 LAB
CYTOLOGIST CVX/VAG CYTO: ABNORMAL
CYTOLOGY CVX/VAG DOC CYTO: ABNORMAL
CYTOLOGY CVX/VAG DOC THIN PREP: ABNORMAL
DX ICD CODE: ABNORMAL
DX ICD CODE: ABNORMAL
HIV 1 & 2 AB SER-IMP: ABNORMAL
HPV I/H RISK 4 DNA CVX QL PROBE+SIG AMP: NEGATIVE
OTHER STN SPEC: ABNORMAL
PATHOLOGIST CVX/VAG CYTO: ABNORMAL
RECOM F/U CVX/VAG CYTO: ABNORMAL
STAT OF ADQ CVX/VAG CYTO-IMP: ABNORMAL

## 2020-02-18 ENCOUNTER — TELEPHONE (OUTPATIENT)
Dept: OBSTETRICS AND GYNECOLOGY | Age: 55
End: 2020-02-18

## 2020-02-18 NOTE — TELEPHONE ENCOUNTER
----- Message from Pino Ge MD sent at 2/18/2020  8:36 AM EST -----  Please call the patient regarding her abnormal result.  During her visit with PA Pap smear was returned abnormal with dysplasia.  We need to perform a colposcopy in the office to better evaluate her cervix.  Please schedule at patient's convenience

## 2020-03-25 ENCOUNTER — OFFICE VISIT (OUTPATIENT)
Dept: OBSTETRICS AND GYNECOLOGY | Age: 55
End: 2020-03-25

## 2020-03-25 VITALS
DIASTOLIC BLOOD PRESSURE: 64 MMHG | WEIGHT: 175 LBS | SYSTOLIC BLOOD PRESSURE: 102 MMHG | BODY MASS INDEX: 27.47 KG/M2 | HEIGHT: 67 IN

## 2020-03-25 DIAGNOSIS — N95.2 ATROPHIC VAGINITIS: ICD-10-CM

## 2020-03-25 DIAGNOSIS — N87.0 MILD DYSPLASIA OF CERVIX (CIN I): Primary | ICD-10-CM

## 2020-03-25 PROCEDURE — 57456 ENDOCERV CURETTAGE W/SCOPE: CPT | Performed by: OBSTETRICS & GYNECOLOGY

## 2020-03-25 NOTE — PROGRESS NOTES
Procedure   Procedures       Physical Exam    Colposcopy Procedure Note        Indications: Pap smear   showed: low-grade squamous intraepithelial neoplasia (LGSIL - encompassing HPV,mild dysplasia,MICHELLE I)        Procedure Details   The risks and benefits of the procedure and verbal, informed consent obtained.    Speculum placed in vagina and excellent visualization of cervix achieved, cervix swabbed x 3 with acetic acid solution. Lugol's solution was also utilized for another visualization of the cervix.    Findings:  Cervix: no visible lesions, no mosaicism, no punctation and no abnormal vasculature; cervix swabbed with Lugol's solution, endocervical speculum placed, SCJ visualized 360 degrees without lesions and endocervical curettage performed.  Vaginal inspection: normal without visible lesions.  Vulvar colposcopy: vulvar colposcopy not performed.    Specimens: Ecto and endocervix    Complications: none.  Patient tolerated the procedure well  Plan:  Will be based on findings and results from today's exam.  Patient tolerated the colposcopy with ECC and spiral brush.  If this is mild dysplasia or less we will repeat Pap smear at next year's annual exam.  If moderate or severe discussed different treatment options.  Reviewed urology's recommendations of Macrobid after intercourse and probiotic  Patient has not been too compliant on her Estrace vaginal cream.      3/25/2020  Pino Ge MD    EMR Dragon/ Transcription disclaimer:  Much of the encounter note is an electronic transcription/translation of spoken language to printed text. The electronic translation of spoken language may permit erroneous, or at times, nonessential words or phrases to be inadvertently transcribes; Although i have reviewed the note for such errors, some may still exist.

## 2020-03-27 LAB
DX ICD CODE: NORMAL
PATH REPORT.FINAL DX SPEC: NORMAL
PATH REPORT.GROSS SPEC: NORMAL
PATH REPORT.SITE OF ORIGIN SPEC: NORMAL
PATHOLOGIST NAME: NORMAL
PAYMENT PROCEDURE: NORMAL

## 2020-03-30 ENCOUNTER — TELEPHONE (OUTPATIENT)
Dept: OBSTETRICS AND GYNECOLOGY | Age: 55
End: 2020-03-30

## 2020-03-30 NOTE — PROGRESS NOTES
Please notify pt. That labs are with in normal limits, no dysplasia, okay to keep are regular scheduled visit as discussed.

## 2020-03-30 NOTE — TELEPHONE ENCOUNTER
----- Message from Pino Ge MD sent at 3/30/2020  7:31 AM EDT -----  Please notify pt. That labs are with in normal limits, no dysplasia, okay to keep are regular scheduled visit as discussed.

## 2020-08-20 ENCOUNTER — TELEMEDICINE (OUTPATIENT)
Dept: INTERNAL MEDICINE | Facility: CLINIC | Age: 55
End: 2020-08-20

## 2020-08-20 DIAGNOSIS — M54.31 SCIATICA OF RIGHT SIDE: Primary | ICD-10-CM

## 2020-08-20 PROCEDURE — 99213 OFFICE O/P EST LOW 20 MIN: CPT | Performed by: INTERNAL MEDICINE

## 2020-08-20 RX ORDER — METHYLPREDNISOLONE 4 MG/1
TABLET ORAL
Qty: 1 EACH | Refills: 1 | Status: SHIPPED | OUTPATIENT
Start: 2020-08-20 | End: 2022-01-24

## 2020-08-20 NOTE — PROGRESS NOTES
Subjective   Echo Thomas is a 54 y.o. female.     Chief Complaint   Patient presents with   • Back Pain   • Leg Pain         Back Pain   This is a recurrent problem. The current episode started in the past 7 days. The problem occurs intermittently. The problem has been waxing and waning since onset. The pain is present in the lumbar spine. The quality of the pain is described as aching. The pain radiates to the right foot. The pain is moderate. Associated symptoms include leg pain. Pertinent negatives include no fever. She has tried NSAIDs for the symptoms. The treatment provided moderate relief.   Leg Pain           The following portions of the patient's history were reviewed and updated as appropriate: allergies, current medications, past social history and problem list.    Outpatient Medications Marked as Taking for the 8/20/20 encounter (Telemedicine) with Frandy Mejia MD   Medication Sig Dispense Refill   • fluconazole (DIFLUCAN) 150 MG tablet TAKE 1 TABLET BY MOUTH NOW. REPEAT IN 3 DAYS 2 tablet 0   • Multiple Vitamin (MULTI-VITAMIN DAILY PO) Take 1 tablet by mouth Daily As Needed.     • nitrofurantoin, macrocrystal-monohydrate, (MACROBID) 100 MG capsule Take 100 mg by mouth 2 (Two) Times a Day.     • saccharomyces boulardii (FLORASTOR) 250 MG capsule Take 1 capsule by mouth 2 (Two) Times a Day. 60 capsule 3   • [DISCONTINUED] estradiol (ESTRACE VAGINAL) 0.1 MG/GM vaginal cream .5 gms pv at introitus 3 times a week 42.5 g 2       Review of Systems   Constitutional: Negative for chills and fever.   Gastrointestinal: Negative for constipation and diarrhea.   Genitourinary: Negative for difficulty urinating.   Musculoskeletal: Positive for back pain.       Objective   There were no vitals filed for this visit.   Wt Readings from Last 3 Encounters:   03/25/20 79.4 kg (175 lb)   02/12/20 80.7 kg (178 lb)   04/17/18 92.4 kg (203 lb 9.6 oz)    There is no height or weight on file to calculate  BMI.      Physical Exam   Constitutional: She appears well-developed and well-nourished. No distress.   Skin: She is not diaphoretic.   Psychiatric: She has a normal mood and affect. Her behavior is normal. Judgment and thought content normal.         Problem List Items Addressed This Visit     None      Visit Diagnoses     Sciatica of right side    -  Primary        Assessment/Plan   Video visit today due to COVID pandemic.  Over the last 3 days or so she is developed some sciatica going down the right leg to the right foot.  Some numbness in the right foot.  10 or 20 years ago she had a disc herniation.  There is a disc fragment causing nerve impingement.  Later dissolved and resolved itself after an epidural.  Since the COVID-19 pandemic she was furloughed for 3 or 4 months.  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  More bending and lifting.  Also have new clean all the equipment.  She thinks that may have caused some of her difficulty.  This on top of spondylolisthesis and spina bifida occulta.  We will add a Medrol Dosepak.  1 refill if need be.  Advil seems to be helping pretty well also.  She knows she needs to get into more core strengthening activities as well and avoid bending and lifting.  We will plan to see PRN.  Spent 12 minutes with patient on the video visit today.             Dragon disclaimer:   Much of this encounter note is an electronic transcription/translation of spoken language to printed text. The electronic translation of spoken language may permit erroneous, or at times, nonsensical words or phrases to be inadvertently transcribed; Although I have reviewed the note for such errors, some may still exist.

## 2020-11-28 ENCOUNTER — TELEMEDICINE (OUTPATIENT)
Dept: FAMILY MEDICINE CLINIC | Facility: TELEHEALTH | Age: 55
End: 2020-11-28

## 2020-11-28 DIAGNOSIS — B00.1 COLD SORE: Primary | ICD-10-CM

## 2020-11-28 PROCEDURE — 99421 OL DIG E/M SVC 5-10 MIN: CPT | Performed by: NURSE PRACTITIONER

## 2020-11-28 RX ORDER — ACYCLOVIR 400 MG/1
400 TABLET ORAL
Qty: 25 TABLET | Refills: 0 | Status: SHIPPED | OUTPATIENT
Start: 2020-11-28 | End: 2020-12-03

## 2020-11-28 NOTE — PROGRESS NOTES
Subjective   Echo Thomas is a 55 y.o. female.     Yesterday she noticed some pain on her bottom lip. Today a lesion appeared. She can't remember the last time she had a cold sore but she believes she has had one a long time ago.        The following portions of the patient's history were reviewed and updated as appropriate: allergies, current medications, past family history, past medical history, past social history, past surgical history and problem list.    Review of Systems   Constitutional: Negative for fever.   HENT: Negative for mouth sores ( none inside the mouth, just lip).    Skin: Positive for skin lesions.       Objective   Physical Exam  Constitutional:       General: She is not in acute distress.     Appearance: She is well-developed. She is not diaphoretic.   HENT:      Mouth/Throat:      Lips: Lesions present.      Comments: Red, raised area of erythema on lower lip, left of the midline  Pulmonary:      Effort: Pulmonary effort is normal.   Neurological:      Mental Status: She is alert and oriented to person, place, and time.   Psychiatric:         Behavior: Behavior normal.           Assessment/Plan   Diagnoses and all orders for this visit:    1. Cold sore (Primary)  -     acyclovir (Zovirax) 400 MG tablet; Take 1 tablet by mouth Every 4 (Four) Hours While Awake for 5 days. Take no more than 5 doses a day.  Dispense: 25 tablet; Refill: 0        I spent 10 minutes in the patient's chart for this video visit.

## 2020-11-28 NOTE — PATIENT INSTRUCTIONS
Cold Sore    A cold sore, also called a fever blister, is a small, fluid-filled sore that forms inside the mouth or on the lips, gums, nose, chin, or cheeks. Cold sores can spread to other parts of the body, such as the eyes or fingers. In some people who have other medical conditions, cold sores can spread to multiple other body sites, including the genitals.  Cold sores can spread from person to person (are contagious) until the sores crust over completely. Most cold sores go away within 2 weeks.  What are the causes?  Cold sores are caused by an infection from a common type of herpes simplex virus (HSV-1). HSV-1 is closely related to the HSV-2virus, which is the virus that causes genital herpes, but these viruses are not the same. Once a person is infected with HSV-1, the virus remains permanently in the body.  HSV-1 is spread from person to person through close contact, such as through kissing, touching the affected area, or sharing personal items such as lip balm, razors, a drinking glass, or eating utensils.  What increases the risk?  You are more likely to develop this condition if you:  · Are tired, stressed, or sick.  · Are menstruating.  · Are pregnant.  · Take certain medicines.  · Are exposed to cold weather or too much sun.  What are the signs or symptoms?  Symptoms of a cold sore outbreak go through different stages. These are the stages of a cold sore:  · Tingling, itching, or burning is felt 1-2 days before the outbreak.  · Fluid-filled blisters appear on the lips, inside the mouth, on the nose, or on the cheeks.  · The blisters start to ooze clear fluid.  · The blisters dry up, and a yellow crust appears in their place.  · The crust falls off.  In some cases, other symptoms can develop during a cold sore outbreak. These can include:  · Fever.  · Sore throat.  · Headache.  · Muscle aches.  · Swollen neck glands.  How is this diagnosed?  This condition is diagnosed based on your medical history and a  physical exam. Your health care provider may do a blood test or may swab some fluid from your sore and then examine the swab in the lab.  How is this treated?  There is no cure for cold sores or HSV-1. There is also no vaccine for HSV-1. Most cold sores go away on their own without treatment within 2 weeks. Medicines cannot make the infection go away, but your health care provider may prescribe medicines to:  · Help relieve some of the pain associated with the sores.  · Work to stop the virus from multiplying.  · Shorten healing time.  Medicines may be in the form of creams, gels, pills, or a shot.  Follow these instructions at home:  Medicines  · Take or apply over-the-counter and prescription medicines only as told by your health care provider.  · Use a cotton-tip swab to apply creams or gels to your sores.  · Ask your health care provider if you can take lysine supplements. Research has found that lysine may help heal the cold sore faster and prevent outbreaks.  Sore care    · Do not touch the sores or pick the scabs.  · Wash your hands often. Do not touch your eyes without washing your hands first.  · Keep the sores clean and dry.  · If directed, apply ice to the sores:  ? Put ice in a plastic bag.  ? Place a towel between your skin and the bag.  ? Leave the ice on for 20 minutes, 2-3 times a day.  Eating and drinking  · Eat a soft, bland diet. Avoid eating hot, cold, or salty foods.  · Use a straw if it hurts to drink out of a glass.  · Eat foods that are rich in lysine, such as meat, fish, and dairy products.  · Avoid sugary foods, chocolates, nuts, and grains. These foods are rich in a nutrient called arginine, which can cause the virus to multiply.  Lifestyle  · Do not kiss, have oral sex, or share personal items until your sores heal.  · Stress, poor sleep, and being out in the sun can trigger outbreaks. Make sure you:  ? Do activities that help you relax, such as deep breathing exercises or  meditation.  ? Get enough sleep.  ? Apply sunscreen on your lips before you go out in the sun.  Contact a health care provider if:  · You have symptoms for more than 2 weeks.  · You have pus coming from the sores.  · You have redness that is spreading.  · You have pain or irritation in your eye.  · You get sores on your genitals.  · Your sores do not heal within 2 weeks.  · You have frequent cold sore outbreaks.  Get help right away if you have:  · A fever and your symptoms suddenly get worse.  · A headache and confusion.  · Fatigue or loss of appetite.  · A stiff neck or sensitivity to light.  Summary  · A cold sore, also called a fever blister, is a small, fluid-filled sore that forms inside the mouth or on the lips, gums, nose, chin, or cheeks.  · Most cold sores go away on their own without treatment within 2 weeks. Your health care provider may prescribe medicines to help relieve some of the pain, work to stop the virus from multiplying, and shorten healing time.  · Wash your hands often. Do not touch your eyes without washing your hands first.  · Do not kiss, have oral sex, or share personal items until your sores heal.  · Contact a health care provider if your sores do not heal within 2 weeks.  This information is not intended to replace advice given to you by your health care provider. Make sure you discuss any questions you have with your health care provider.  Document Revised: 04/08/2020 Document Reviewed: 05/20/2019  [employer] Patient Education © 2020 [employer]  Acyclovir tablets or capsules  What is this medicine?  ACYCLOVIR (ay JOSIAH dennis verita) is an antiviral medicine. It is used to treat or prevent infections caused by certain kinds of viruses. Examples of these infections include herpes and shingles. This medicine will not cure herpes.  This medicine may be used for other purposes; ask your health care provider or pharmacist if you have questions.  COMMON BRAND NAME(S): Zovirax  What should I tell  my health care provider before I take this medicine?  They need to know if you have any of these conditions:  · kidney disease  · an unusual or allergic reaction to acyclovir, ganciclovir, valacyclovir, other medicines, foods, dyes, or preservatives  · pregnant or trying to get pregnant  · breast-feeding  How should I use this medicine?  Take this medicine by mouth with a glass of water. Follow the directions on the prescription label. You can take it with or without food. Take your medicine at regular intervals. Do not take your medicine more often than directed. Take all of your medicine as directed even if you think your are better. Do not skip doses or stop your medicine early.  Talk to your pediatrician regarding the use of this medicine in children. While this drug may be prescribed for selected conditions, precautions do apply.  Overdosage: If you think you have taken too much of this medicine contact a poison control center or emergency room at once.  NOTE: This medicine is only for you. Do not share this medicine with others.  What if I miss a dose?  If you miss a dose, take it as soon as you can. If it is almost time for your next dose, take only that dose. Do not take double or extra doses.  What may interact with this medicine?  Do not take this medicine with any of the following medications:  · cidofovir  This medicine may also interact with the following medications:  · adefovir  · amphotericin B  · certain antibiotics like amikacin, gentamicin, tobramycin, vancomycin  · cimetidine  · cisplatin  · colistin  · cyclosporine  · foscarnet  · lithium  · methotrexate  · probenecid  · tacrolimus  This list may not describe all possible interactions. Give your health care provider a list of all the medicines, herbs, non-prescription drugs, or dietary supplements you use. Also tell them if you smoke, drink alcohol, or use illegal drugs. Some items may interact with your medicine.  What should I watch for while  using this medicine?  Tell your doctor or health care professional if your symptoms do not improve. This medicine works best when started very early in the course of an infection. Begin treatment at the first signs of infection.  Drink 6 to 8 glasses of water or fluids every day while you are taking this medicine. This will help prevent side effects.  You can still pass chickenpox, shingles, or herpes to another person even while you are taking this medicine. Avoid contact with others as directed. Genital herpes is a sexually transmitted disease. Talk to your doctor about how to stop the spread of infection.  What side effects may I notice from receiving this medicine?  Side effects that you should report to your doctor or health care professional as soon as possible:  · allergic reactions like skin rash, itching or hives, swelling of the face, lips, or tongue  · chest pain  · confusion, hallucinations, tremor  · dark urine  · increased sensitivity to the sun  · redness, blistering, peeling or loosening of the skin, including inside the mouth  · seizures  · trouble passing urine or change in the amount of urine  · unusual bleeding or bruising, or pinpoint red spots on the skin  · unusually weak or tired  · yellowing of the eyes or skin  Side effects that usually do not require medical attention (report to your doctor or health care professional if they continue or are bothersome):  · diarrhea  · fever  · headache  · nausea, vomiting  · stomach upset  This list may not describe all possible side effects. Call your doctor for medical advice about side effects. You may report side effects to FDA at 1-360-FDA-6429.  Where should I keep my medicine?  Keep out of the reach of children.  Store at room temperature between 15 and 25 degrees C (59 and 77 degrees F). Throw away any unused medicine after the expiration date.  NOTE: This sheet is a summary. It may not cover all possible information. If you have questions about  this medicine, talk to your doctor, pharmacist, or health care provider.  © 2020 [employer] (2020-01-14 12:34:25)

## 2020-12-15 ENCOUNTER — TELEPHONE (OUTPATIENT)
Dept: OBSTETRICS AND GYNECOLOGY | Age: 55
End: 2020-12-15

## 2020-12-15 NOTE — TELEPHONE ENCOUNTER
Pt calling in believes she has a yeast infection, and would like something called in.  Discharge, cloudy, no odor, no fever, no chills, no itching.  Pharm verified.

## 2020-12-18 ENCOUNTER — TELEPHONE (OUTPATIENT)
Dept: OBSTETRICS AND GYNECOLOGY | Age: 55
End: 2020-12-18

## 2020-12-18 RX ORDER — FLUCONAZOLE 150 MG/1
150 TABLET ORAL ONCE
Qty: 1 TABLET | Refills: 0 | Status: SHIPPED | OUTPATIENT
Start: 2020-12-18 | End: 2021-11-30

## 2020-12-18 NOTE — TELEPHONE ENCOUNTER
Pt called, stated that  called in a rx for her on 11/15/20 for a yeast infection, stated that it was a vaginal cream and the patient would like to have an oral pill. Please advise, pharmacy confirmed

## 2020-12-18 NOTE — TELEPHONE ENCOUNTER
Called pt, stated that she used the vaginal cream for one day and she didn't like it.    Stated that she would prefer the pill over the cream.

## 2021-01-03 ENCOUNTER — IMMUNIZATION (OUTPATIENT)
Dept: VACCINE CLINIC | Facility: HOSPITAL | Age: 56
End: 2021-01-03

## 2021-01-03 PROCEDURE — 0001A: CPT | Performed by: INTERNAL MEDICINE

## 2021-01-03 PROCEDURE — 91300 HC SARSCOV02 VAC 30MCG/0.3ML IM: CPT | Performed by: INTERNAL MEDICINE

## 2021-01-25 ENCOUNTER — IMMUNIZATION (OUTPATIENT)
Dept: VACCINE CLINIC | Facility: HOSPITAL | Age: 56
End: 2021-01-25

## 2021-01-25 PROCEDURE — 0002A: CPT | Performed by: INTERNAL MEDICINE

## 2021-01-25 PROCEDURE — 91300 HC SARSCOV02 VAC 30MCG/0.3ML IM: CPT | Performed by: INTERNAL MEDICINE

## 2021-08-20 ENCOUNTER — TRANSCRIBE ORDERS (OUTPATIENT)
Dept: ADMINISTRATIVE | Facility: HOSPITAL | Age: 56
End: 2021-08-20

## 2021-08-20 DIAGNOSIS — M25.511 RIGHT SHOULDER PAIN, UNSPECIFIED CHRONICITY: Primary | ICD-10-CM

## 2021-09-13 ENCOUNTER — HOSPITAL ENCOUNTER (OUTPATIENT)
Dept: MRI IMAGING | Facility: HOSPITAL | Age: 56
Discharge: HOME | End: 2021-09-13
Admitting: ORTHOPAEDIC SURGERY

## 2021-09-13 DIAGNOSIS — M25.511 RIGHT SHOULDER PAIN, UNSPECIFIED CHRONICITY: ICD-10-CM

## 2021-09-13 PROCEDURE — 73221 MRI JOINT UPR EXTREM W/O DYE: CPT

## 2021-11-09 ENCOUNTER — IMMUNIZATION (OUTPATIENT)
Dept: VACCINE CLINIC | Facility: HOSPITAL | Age: 56
End: 2021-11-09

## 2021-11-09 PROCEDURE — 0004A ADM SARSCOV2 30MCG/0.3ML BOOSTER: CPT | Performed by: INTERNAL MEDICINE

## 2021-11-09 PROCEDURE — 91300 HC SARSCOV02 VAC 30MCG/0.3ML IM: CPT | Performed by: INTERNAL MEDICINE

## 2021-11-15 ENCOUNTER — TRANSCRIBE ORDERS (OUTPATIENT)
Dept: ADMINISTRATIVE | Facility: HOSPITAL | Age: 56
End: 2021-11-15

## 2021-11-15 DIAGNOSIS — Z12.31 SCREENING MAMMOGRAM FOR BREAST CANCER: Primary | ICD-10-CM

## 2021-11-29 ENCOUNTER — TELEPHONE (OUTPATIENT)
Dept: OBSTETRICS AND GYNECOLOGY | Age: 56
End: 2021-11-29

## 2021-11-29 NOTE — TELEPHONE ENCOUNTER
Pt calls with c/o white discharge, irritation, some burning sensations without urination, onset yesterday. PT asking for medication to be called in for this. Please advise, pharmacy verified

## 2021-11-29 NOTE — TELEPHONE ENCOUNTER
Last time Diflucan did not work and we ended up on Terazol.    Diflucan is becoming less effective as resistance is more prominent

## 2021-11-29 NOTE — TELEPHONE ENCOUNTER
Pt notified and states she does not like to use vaginal cream and is requesting diflucan to be called in, please advise

## 2021-11-30 RX ORDER — FLUCONAZOLE 150 MG/1
TABLET ORAL
Qty: 1 TABLET | Refills: 0 | Status: SHIPPED | OUTPATIENT
Start: 2021-11-30 | End: 2022-01-24

## 2021-11-30 NOTE — TELEPHONE ENCOUNTER
Patient notified and she voices understanding.  She says he will contact the pharmacy and except the RX as prescribed.

## 2021-12-14 ENCOUNTER — HOSPITAL ENCOUNTER (OUTPATIENT)
Dept: MAMMOGRAPHY | Facility: HOSPITAL | Age: 56
Discharge: HOME | End: 2021-12-14
Admitting: OBSTETRICS & GYNECOLOGY

## 2021-12-14 DIAGNOSIS — Z12.31 SCREENING MAMMOGRAM FOR BREAST CANCER: ICD-10-CM

## 2021-12-14 PROCEDURE — 77067 SCR MAMMO BI INCL CAD: CPT

## 2021-12-14 PROCEDURE — 77063 BREAST TOMOSYNTHESIS BI: CPT

## 2022-01-24 ENCOUNTER — OFFICE VISIT (OUTPATIENT)
Dept: SURGERY | Facility: CLINIC | Age: 57
End: 2022-01-24

## 2022-01-24 VITALS — BODY MASS INDEX: 30.13 KG/M2 | WEIGHT: 192 LBS | HEIGHT: 67 IN

## 2022-01-24 DIAGNOSIS — L05.91 PILONIDAL CYST: Primary | ICD-10-CM

## 2022-01-24 PROCEDURE — 99202 OFFICE O/P NEW SF 15 MIN: CPT | Performed by: SURGERY

## 2022-01-28 NOTE — PROGRESS NOTES
SUMMARY (A/P):    56-year-old lady with several small plaque-like areas in the pilonidal region with questionable sinus openings on my examination today.  Physical exam findings with her reported history are consistent with pilonidal cyst disease although there are no findings on exam today that warrant further work-up or surgical intervention.  We discussed the option of surgical excision including the nature of the procedure and the risks including but not limited to wound dehiscence with healing by secondary intention which typically takes a protracted period of time with this type of problem.  All questions were answered and she will return if the area becomes more bothersome.      CC:    Pain in pilonidal region    HPI:    56-year-old lady who had an episode of pain in the pilonidal region in May of last year for which she was placed on antibiotics but feels that the area did not heal properly.  She has noted a small cutaneous opening and has had some drainage from this area.    PHYSICAL EXAM:   • Constitutional: Well-developed well-nourished, no acute distress  • Skin:  Warm, dry.  In the pilonidal region there are several 3 mm plaque-like cutaneous changes that appear benign.  There is a questionable sinus opening with no drainage.  There is no erythema, no crepitance, no fluctuance.  • Psychiatric: Alert and oriented ×3, normal affect     WESTON BONILLA M.D.

## 2022-01-31 ENCOUNTER — OFFICE VISIT (OUTPATIENT)
Dept: OBSTETRICS AND GYNECOLOGY | Age: 57
End: 2022-01-31

## 2022-01-31 VITALS
DIASTOLIC BLOOD PRESSURE: 76 MMHG | SYSTOLIC BLOOD PRESSURE: 124 MMHG | BODY MASS INDEX: 29.66 KG/M2 | WEIGHT: 189 LBS | HEIGHT: 67 IN

## 2022-01-31 DIAGNOSIS — Z13.0 SCREENING FOR IRON DEFICIENCY ANEMIA: ICD-10-CM

## 2022-01-31 DIAGNOSIS — Z13.1 SCREENING FOR DIABETES MELLITUS: ICD-10-CM

## 2022-01-31 DIAGNOSIS — Z13.220 SCREENING FOR CHOLESTEROL LEVEL: ICD-10-CM

## 2022-01-31 DIAGNOSIS — Z13.29 SCREENING FOR THYROID DISORDER: ICD-10-CM

## 2022-01-31 DIAGNOSIS — B97.7 HPV IN FEMALE: ICD-10-CM

## 2022-01-31 DIAGNOSIS — N87.0 MILD DYSPLASIA OF CERVIX (CIN I): ICD-10-CM

## 2022-01-31 DIAGNOSIS — Z11.3 SCREEN FOR STD (SEXUALLY TRANSMITTED DISEASE): ICD-10-CM

## 2022-01-31 DIAGNOSIS — Z12.4 ENCOUNTER FOR PAPANICOLAOU SMEAR FOR CERVICAL CANCER SCREENING: ICD-10-CM

## 2022-01-31 DIAGNOSIS — Z01.419 WELL WOMAN EXAM WITH ROUTINE GYNECOLOGICAL EXAM: Primary | ICD-10-CM

## 2022-01-31 PROCEDURE — 99396 PREV VISIT EST AGE 40-64: CPT | Performed by: PHYSICIAN ASSISTANT

## 2022-01-31 RX ORDER — AZITHROMYCIN 250 MG/1
TABLET, FILM COATED ORAL
Qty: 6 TABLET | Refills: 0 | Status: SHIPPED | OUTPATIENT
Start: 2022-01-31 | End: 2022-12-16

## 2022-01-31 NOTE — PROGRESS NOTES
"Subjective     Chief Complaint   Patient presents with   • Gynecologic Exam     annual exam last pap 2020 LGSIL/neg, colpo 2020 no dysplasia, m/g 2021, c/scope        History of Present Illness    Echo Montelongo is a 56 y.o.  who presents for annual exam.    She is ok  Hasn't been seen since   Last pap was abnormal  colpo was done then and was \"normal\"    She got  last year  He was cheating  She would like std testing    Has a new partner and he has been dxed with herpes  He is on a daily anti viral medication       Obstetric History:  OB History        3    Para   3    Term   2       1    AB        Living   2       SAB        IAB        Ectopic        Molar        Multiple        Live Births   2               Menstrual History:     No LMP recorded (lmp unknown). Patient is postmenopausal.         Current contraception: post menopausal status  History of abnormal Pap smear: yes -   Received Gardasil immunization: no  Perform regular self breast exam: yes - occl  Family history of uterine or ovarian cancer: no  Family History of colon cancer: no   Family history of breast cancer: no    Mammogram: up to date.  Colonoscopy: up to date.  DEXA: not indicated.    Exercise: moderately active  Calcium/Vitamin D: adequate intake    The following portions of the patient's history were reviewed and updated as appropriate: allergies, current medications, past family history, past medical history, past social history, past surgical history and problem list.    Review of Systems   All other systems reviewed and are negative.      Review of Systems   Constitutional: Negative for fatigue.   Respiratory: Negative for shortness of breath.    Gastrointestinal: Negative for abdominal pain.   Genitourinary: Negative for dysuria.   Neurological: Negative for headaches.   Psychiatric/Behavioral: Negative for dysphoric mood.         Objective   Physical Exam    /76   Ht 170.2 " "cm (67\")   Wt 85.7 kg (189 lb)   LMP  (LMP Unknown)   Breastfeeding No   BMI 29.60 kg/m²   General:   alert, comfortable and no distress   Heart: regular rate and rhythm   Lungs: clear to auscultation bilaterally   Breast: normal appearance, no masses or tenderness, Inspection negative, No nipple retraction or dimpling, No nipple discharge or bleeding, No axillary or supraclavicular adenopathy, Normal to palpation without dominant masses   Neck: no adenopathy and no carotid bruit   Abdomen: {normal findings: soft, non-tender   CVA: Not performed today   Pelvis: External genitalia: normal general appearance  Vaginal: normal mucosa without prolapse or lesions and atrophic mucosa  Cervix: normal appearance and thin prep PAP obtained  Adnexa: normal bimanual exam  Uterus: normal single, nontender  Rectal: external  Hemorrhoids noted   Extremities: Not performed today   Neurologic: Not performed today   Psychiatric: Normal affect, judgement, and mood     Assessment/Plan   Diagnoses and all orders for this visit:    1. Well woman exam with routine gynecological exam (Primary)    2. HPV in female  -     Cancel: IGP, Aptima HPV, Rfx 16 / 18,45  -     IGP,CtNgTv,Apt HPV    3. Mild dysplasia of cervix (MICHELLE I)  -     Cancel: IGP, Aptima HPV, Rfx 16 / 18,45  -     IGP,CtNgTv,Apt HPV    4. Encounter for Papanicolaou smear for cervical cancer screening  -     Cancel: IGP, Aptima HPV, Rfx 16 / 18,45  -     IGP,CtNgTv,Apt HPV    5. Screen for STD (sexually transmitted disease)  -     IGP,CtNgTv,Apt HPV  -     RPR, Rfx Qn RPR / Confirm TP  -     Hepatitis B Surface Antigen  -     Hepatitis C Antibody  -     HIV-1 / O / 2 Ag / Antibody 4th Generation  -     HSV 1 & 2 - Specific Antibody, IgG    6. Screening for iron deficiency anemia  -     CBC & Differential  -     Ferritin    7. Screening for diabetes mellitus  -     Comprehensive Metabolic Panel  -     Hemoglobin A1c    8. Screening for thyroid disorder  -     TSH    9. " "Screening for cholesterol level  -     Lipid Panel    Other orders  -     azithromycin (Zithromax Z-Won) 250 MG tablet; Take 2 tablets by mouth on day 1, then 1 tablet daily on days 2-5  Dispense: 6 tablet; Refill: 0        All questions answered.  Breast self exam technique reviewed and patient encouraged to perform self-exam monthly.  Discussed healthy lifestyle modifications.  Recommended 30 minutes of aerobic exercise five times per week.  Discussed calcium needs to prevent osteoporosis.    Pap done  Std testing done  Disc herpes and gave HO  zpack given for possible sinus infection, advised to f/u with PCP if not feeling better after taking it  HM labs ordered at her request  Does have a pcp she sees, Dr Mejia, but also sees a \"naturopath\"-Dr Page               "

## 2022-02-01 ENCOUNTER — TELEPHONE (OUTPATIENT)
Dept: OBSTETRICS AND GYNECOLOGY | Age: 57
End: 2022-02-01

## 2022-02-01 LAB
ALBUMIN SERPL-MCNC: 4.6 G/DL (ref 3.8–4.9)
ALBUMIN/GLOB SERPL: 1.7 {RATIO} (ref 1.2–2.2)
ALP SERPL-CCNC: 107 IU/L (ref 44–121)
ALT SERPL-CCNC: 19 IU/L (ref 0–32)
AST SERPL-CCNC: 21 IU/L (ref 0–40)
BASOPHILS # BLD AUTO: 0 X10E3/UL (ref 0–0.2)
BASOPHILS NFR BLD AUTO: 1 %
BILIRUB SERPL-MCNC: 0.4 MG/DL (ref 0–1.2)
BUN SERPL-MCNC: 13 MG/DL (ref 6–24)
BUN/CREAT SERPL: 15 (ref 9–23)
CALCIUM SERPL-MCNC: 9.8 MG/DL (ref 8.7–10.2)
CHLORIDE SERPL-SCNC: 103 MMOL/L (ref 96–106)
CHOLEST SERPL-MCNC: 292 MG/DL (ref 100–199)
CO2 SERPL-SCNC: 25 MMOL/L (ref 20–29)
CREAT SERPL-MCNC: 0.85 MG/DL (ref 0.57–1)
EOSINOPHIL # BLD AUTO: 0.4 X10E3/UL (ref 0–0.4)
EOSINOPHIL NFR BLD AUTO: 9 %
ERYTHROCYTE [DISTWIDTH] IN BLOOD BY AUTOMATED COUNT: 11.9 % (ref 11.7–15.4)
FERRITIN SERPL-MCNC: 68 NG/ML (ref 15–150)
GLOBULIN SER CALC-MCNC: 2.7 G/DL (ref 1.5–4.5)
GLUCOSE SERPL-MCNC: 98 MG/DL (ref 65–99)
HBA1C MFR BLD: 5.7 % (ref 4.8–5.6)
HBV SURFACE AG SERPL QL IA: NEGATIVE
HCT VFR BLD AUTO: 44.7 % (ref 34–46.6)
HCV AB S/CO SERPL IA: <0.1 S/CO RATIO (ref 0–0.9)
HDLC SERPL-MCNC: 55 MG/DL
HGB BLD-MCNC: 14.6 G/DL (ref 11.1–15.9)
HIV 1+2 AB+HIV1 P24 AG SERPL QL IA: NON REACTIVE
HSV1 IGG SER IA-ACNC: <0.91 INDEX (ref 0–0.9)
HSV2 IGG SER IA-ACNC: <0.91 INDEX (ref 0–0.9)
IMM GRANULOCYTES # BLD AUTO: 0 X10E3/UL (ref 0–0.1)
IMM GRANULOCYTES NFR BLD AUTO: 0 %
LDLC SERPL CALC-MCNC: 207 MG/DL (ref 0–99)
LYMPHOCYTES # BLD AUTO: 1.4 X10E3/UL (ref 0.7–3.1)
LYMPHOCYTES NFR BLD AUTO: 31 %
MCH RBC QN AUTO: 29.7 PG (ref 26.6–33)
MCHC RBC AUTO-ENTMCNC: 32.7 G/DL (ref 31.5–35.7)
MCV RBC AUTO: 91 FL (ref 79–97)
MONOCYTES # BLD AUTO: 0.3 X10E3/UL (ref 0.1–0.9)
MONOCYTES NFR BLD AUTO: 6 %
NEUTROPHILS # BLD AUTO: 2.3 X10E3/UL (ref 1.4–7)
NEUTROPHILS NFR BLD AUTO: 53 %
PLATELET # BLD AUTO: 270 X10E3/UL (ref 150–450)
POTASSIUM SERPL-SCNC: 4.4 MMOL/L (ref 3.5–5.2)
PROT SERPL-MCNC: 7.3 G/DL (ref 6–8.5)
RBC # BLD AUTO: 4.92 X10E6/UL (ref 3.77–5.28)
RPR SER QL: NON REACTIVE
SODIUM SERPL-SCNC: 141 MMOL/L (ref 134–144)
TRIGL SERPL-MCNC: 162 MG/DL (ref 0–149)
TSH SERPL-ACNC: 0.73 UIU/ML (ref 0.45–4.5)
VLDLC SERPL CALC-MCNC: 30 MG/DL (ref 5–40)
WBC # BLD AUTO: 4.4 X10E3/UL (ref 3.4–10.8)

## 2022-02-01 NOTE — TELEPHONE ENCOUNTER
----- Message from MAURICE Leary sent at 2/1/2022  9:00 AM EST -----  Let Echo know her CBC and CMP are wnl. Thyroid is also normal. Her A1C is slightly elevated, c/w borderline diabetes. I would recommend improved diet and exercise and f/u with PCP. Cholesterol is also high, but decreased from when we last checked it 3 ya. Diet changes and f/u with PCP. Serum std is totally negative. Ferritin is wnl, but low normal, just increase dietary intake of iron.

## 2022-02-04 LAB
C TRACH RRNA CVX QL NAA+PROBE: NEGATIVE
CYTOLOGIST CVX/VAG CYTO: ABNORMAL
CYTOLOGY CVX/VAG DOC CYTO: ABNORMAL
CYTOLOGY CVX/VAG DOC THIN PREP: ABNORMAL
DX ICD CODE: ABNORMAL
DX ICD CODE: ABNORMAL
HIV 1 & 2 AB SER-IMP: ABNORMAL
HPV I/H RISK 4 DNA CVX QL PROBE+SIG AMP: NEGATIVE
N GONORRHOEA RRNA CVX QL NAA+PROBE: NEGATIVE
OTHER STN SPEC: ABNORMAL
PATHOLOGIST CVX/VAG CYTO: ABNORMAL
STAT OF ADQ CVX/VAG CYTO-IMP: ABNORMAL
T VAGINALIS RRNA SPEC QL NAA+PROBE: NEGATIVE

## 2022-02-11 ENCOUNTER — TELEPHONE (OUTPATIENT)
Dept: OBSTETRICS AND GYNECOLOGY | Age: 57
End: 2022-02-11

## 2022-02-11 NOTE — TELEPHONE ENCOUNTER
Pt scheduled for colpo on March 23, she has a patient scheduled that day and wanted to know if there is anything sooner

## 2022-03-09 ENCOUNTER — PROCEDURE VISIT (OUTPATIENT)
Dept: OBSTETRICS AND GYNECOLOGY | Age: 57
End: 2022-03-09

## 2022-03-09 VITALS
BODY MASS INDEX: 29.66 KG/M2 | HEIGHT: 67 IN | DIASTOLIC BLOOD PRESSURE: 68 MMHG | SYSTOLIC BLOOD PRESSURE: 122 MMHG | WEIGHT: 189 LBS

## 2022-03-09 DIAGNOSIS — N87.9 CERVICAL DYSPLASIA: Primary | ICD-10-CM

## 2022-03-09 PROCEDURE — 57454 BX/CURETT OF CERVIX W/SCOPE: CPT | Performed by: OBSTETRICS & GYNECOLOGY

## 2022-03-09 NOTE — PROGRESS NOTES
Procedure   Procedures       Physical Exam    Colposcopy Procedure Note        Indications: Pap smear   showed: low-grade squamous intraepithelial neoplasia (LGSIL - encompassing HPV,mild dysplasia,MICHELLE I)        Procedure Details   The risks and benefits of the procedure and verbal, informed consent obtained.    Speculum placed in vagina and excellent visualization of cervix achieved, cervix swabbed x 3 with acetic acid solution. Lugol's solution was also utilized for another visualization of the cervix.    Findings:  Cervix: no visible lesions, no mosaicism, no punctation and no abnormal vasculature; cervix swabbed with Lugol's solution, endocervical speculum placed, SCJ visualized 360 degrees without lesions, endocervical curettage performed and cervical biopsies taken at 11 o'clock.  Vaginal inspection: normal without visible lesions.  Vulvar colposcopy: normal mucosa without lesions.    Specimens: Endocervical cells ectocervical cells and cervical biopsy all sent in the same container    Complications: none.  Patient tolerated the procedure well  Plan:  Will be based on findings and results from today's exam.        3/9/2022  Pino Ge MD    EMR Dragon/ Transcription disclaimer:  Much of the encounter note is an electronic transcription/translation of spoken language to printed text. The electronic translation of spoken language may permit erroneous, or at times, nonessential words or phrases to be inadvertently transcribes; Although i have reviewed the note for such errors, some may still exist.

## 2022-03-12 NOTE — PROGRESS NOTES
Please notify pt. That labs are with in normal limits there was no dysplasia or precancerous changes only HPV as we discussed, okay to simply repeat Pap smear next year

## 2022-03-14 ENCOUNTER — TELEPHONE (OUTPATIENT)
Dept: OBSTETRICS AND GYNECOLOGY | Age: 57
End: 2022-03-14

## 2022-03-14 NOTE — TELEPHONE ENCOUNTER
----- Message from Pino Ge MD sent at 3/12/2022 12:08 PM EST -----   Please notify pt. That labs are with in normal limits there was no dysplasia or precancerous changes only HPV as we discussed, okay to simply repeat Pap smear next year

## 2022-06-10 ENCOUNTER — IMMUNIZATION (OUTPATIENT)
Dept: VACCINE CLINIC | Facility: HOSPITAL | Age: 57
End: 2022-06-10

## 2022-06-10 DIAGNOSIS — Z23 NEED FOR VACCINATION: Primary | ICD-10-CM

## 2022-06-10 PROCEDURE — 0054A HC ADM SARSCV2 30MCG TRS-SUCR BOOSTER: CPT | Performed by: INTERNAL MEDICINE

## 2022-06-10 PROCEDURE — 91305 HC SARSCOV2 VAC 30 MCG TRS-SUCR PFIZER: CPT | Performed by: INTERNAL MEDICINE

## 2022-08-25 ENCOUNTER — TRANSCRIBE ORDERS (OUTPATIENT)
Dept: ADMINISTRATIVE | Facility: HOSPITAL | Age: 57
End: 2022-08-25

## 2022-08-25 DIAGNOSIS — M25.562 LEFT KNEE PAIN, UNSPECIFIED CHRONICITY: Primary | ICD-10-CM

## 2022-09-15 ENCOUNTER — HOSPITAL ENCOUNTER (OUTPATIENT)
Dept: MRI IMAGING | Facility: HOSPITAL | Age: 57
Discharge: HOME | End: 2022-09-15
Admitting: ORTHOPAEDIC SURGERY

## 2022-09-15 DIAGNOSIS — M25.562 LEFT KNEE PAIN, UNSPECIFIED CHRONICITY: ICD-10-CM

## 2022-09-15 PROCEDURE — 73721 MRI JNT OF LWR EXTRE W/O DYE: CPT

## 2022-10-28 ENCOUNTER — OFFICE VISIT (OUTPATIENT)
Dept: SPORTS MEDICINE | Facility: CLINIC | Age: 57
End: 2022-10-28

## 2022-10-28 ENCOUNTER — TELEPHONE (OUTPATIENT)
Dept: OBSTETRICS AND GYNECOLOGY | Age: 57
End: 2022-10-28

## 2022-10-28 VITALS
SYSTOLIC BLOOD PRESSURE: 116 MMHG | DIASTOLIC BLOOD PRESSURE: 74 MMHG | BODY MASS INDEX: 30.13 KG/M2 | HEIGHT: 67 IN | HEART RATE: 98 BPM | OXYGEN SATURATION: 99 % | WEIGHT: 192 LBS | TEMPERATURE: 97.8 F

## 2022-10-28 DIAGNOSIS — M22.42 CHONDROMALACIA OF LEFT PATELLA: Primary | ICD-10-CM

## 2022-10-28 DIAGNOSIS — S83.242A ACUTE MEDIAL MENISCAL TEAR, LEFT, INITIAL ENCOUNTER: ICD-10-CM

## 2022-10-28 PROCEDURE — 99213 OFFICE O/P EST LOW 20 MIN: CPT | Performed by: FAMILY MEDICINE

## 2022-10-28 RX ORDER — FLUCONAZOLE 150 MG/1
150 TABLET ORAL
Qty: 2 TABLET | Refills: 0 | Status: SHIPPED | OUTPATIENT
Start: 2022-10-28 | End: 2022-11-08

## 2022-10-28 NOTE — TELEPHONE ENCOUNTER
Pt calling stating the diflucan has not been filled and would like it called into her pharmacy on file. Pt states she is having discharge with slight itching    Please advise     [phone]

## 2022-11-02 ENCOUNTER — PATIENT ROUNDING (BHMG ONLY) (OUTPATIENT)
Dept: SPORTS MEDICINE | Facility: CLINIC | Age: 57
End: 2022-11-02

## 2022-11-02 ENCOUNTER — TELEPHONE (OUTPATIENT)
Dept: SPORTS MEDICINE | Facility: CLINIC | Age: 57
End: 2022-11-02

## 2022-11-02 NOTE — TELEPHONE ENCOUNTER
Unfortunately patients plan does not cover this types of injections even with being a [employer] employee. I informed patient and she will call her insurance to double check this information.      Please advise what is the next step for patient,

## 2022-11-02 NOTE — TELEPHONE ENCOUNTER
Caller: RICHELLE CHRISTIAN    Relationship: SELF    Best call back number:     What is the best time to reach you: ANYTIME     What was the call regarding: THE PATIENT WOULD LIKE TO KNOW IF HER PRP INJECTION HAS BEEN APPROVED BY HER INSURANCE OR IF HER APPOINTMENT ON 11/4/22 NEEDS TO BE RESCHEDULED    Do you require a callback: YES

## 2022-11-02 NOTE — TELEPHONE ENCOUNTER
Called and spoke with patient, informed her that I called her insurance and they stated that this is not a covered benefit by the patients plan.     She stated that she has a coworker with the same insurance and that insurance covered it.     I informed patient that,yes, prior to this request I have not had an issue with verifying coverage however I do not advise after being told that this a non covered benefit even with being a [employer] employee that patient gets the injections with out receiving a confirmation from employer of insurance in order to avoid a denial. Patient verbalized understanding and stated that she would call her insurance to reverify.    I called insurance for a second time to double check my information- Ref # [id number] I was again informed that this an exclusion from patients plan and deemed not medical necessary.     I will send Dr. Stoner a message asking for advise on next step.

## 2022-11-02 NOTE — PROGRESS NOTES
November 2, 2022    A [employer] Message has been sent to the patient for PATIENT ROUNDING with [facility]

## 2022-11-03 ENCOUNTER — TRANSCRIBE ORDERS (OUTPATIENT)
Dept: ADMINISTRATIVE | Facility: HOSPITAL | Age: 57
End: 2022-11-03

## 2022-11-03 DIAGNOSIS — Z12.31 SCREENING MAMMOGRAM FOR HIGH-RISK PATIENT: Primary | ICD-10-CM

## 2022-11-08 ENCOUNTER — OFFICE VISIT (OUTPATIENT)
Dept: GASTROENTEROLOGY | Facility: CLINIC | Age: 57
End: 2022-11-08

## 2022-11-08 ENCOUNTER — TELEPHONE (OUTPATIENT)
Dept: GASTROENTEROLOGY | Facility: CLINIC | Age: 57
End: 2022-11-08

## 2022-11-08 VITALS
SYSTOLIC BLOOD PRESSURE: 112 MMHG | DIASTOLIC BLOOD PRESSURE: 77 MMHG | HEIGHT: 67 IN | TEMPERATURE: 96.4 F | WEIGHT: 190 LBS | OXYGEN SATURATION: 95 % | BODY MASS INDEX: 29.82 KG/M2 | HEART RATE: 92 BPM

## 2022-11-08 DIAGNOSIS — K21.9 GASTROESOPHAGEAL REFLUX DISEASE, UNSPECIFIED WHETHER ESOPHAGITIS PRESENT: Primary | ICD-10-CM

## 2022-11-08 DIAGNOSIS — R13.10 DYSPHAGIA, UNSPECIFIED TYPE: ICD-10-CM

## 2022-11-08 PROCEDURE — 99203 OFFICE O/P NEW LOW 30 MIN: CPT | Performed by: INTERNAL MEDICINE

## 2022-11-08 RX ORDER — SODIUM CHLORIDE, SODIUM LACTATE, POTASSIUM CHLORIDE, CALCIUM CHLORIDE 600; 310; 30; 20 MG/100ML; MG/100ML; MG/100ML; MG/100ML
30 INJECTION, SOLUTION INTRAVENOUS CONTINUOUS
Status: CANCELLED | OUTPATIENT
Start: 2023-01-04

## 2022-11-08 NOTE — TELEPHONE ENCOUNTER
ALIYAH patient via telephone for. Scheduled 12/19/2022 with arrival time of PREMIER WILL CALL WITH TIME. Prep paperwork mailed to verified address on file. Patient advised arrival time may change based on [facility] guidelines. ALIYAH APRIL

## 2022-11-08 NOTE — PROGRESS NOTES
Chief Complaint   Patient presents with   • Difficulty Swallowing   • Heartburn        Echo Montelongo is a  57 y.o. female here for an initial visit for GERD, dysphagia    HPI this 57-year-old white female patient of Dr. Frandy KIRAN presents with a longstanding history of reflux and relatively recent issues with dysphagia to solid foods.  Specifically she has problems with breads and meats.  This occurs approximately 2 times per week.  It either passes on its own or requires regurgitation.  She has had previous upper and lower endoscopies performed by Dr. Jamie Wilson in 2013.  Per her account those studies were negative.  There is a family history of esophageal stricture involving a grandmother and a paternal uncle had esophageal cancer.  We talked about further evaluation and she would prefer to undergo upper endoscopic examination at this time.  She warrants colonoscopic examination in June of next year for routine screening purposes.    Past Medical History:   Diagnosis Date   • Abnormal Pap smear of cervix     LGSIL,1/31/22,negative hpv   • Anemia    • Disease of thyroid gland    • GERD (gastroesophageal reflux disease)    • High cholesterol     BORDERLINE NO MEDICINE   • History of reduction of orbital fracture     RIGHT   • Hypothyroidism    • PONV (postoperative nausea and vomiting)     SEVERE N/V   • Urinary incontinence        Current Outpatient Medications   Medication Sig Dispense Refill   • Ascorbic Acid (VITAMIN C PO) Take 1 tablet by mouth Daily.     • clindamycin (Clindacin-P) 1 % swab Apply topically to the affected area(s) twice daily. 69 each 1   • Ferrous Sulfate (IRON PO) Take 1 tablet by mouth Daily.     • Multiple Vitamin (MULTI-VITAMIN DAILY PO) Take 1 tablet by mouth Daily As Needed.     • saccharomyces boulardii (FLORASTOR) 250 MG capsule Take 1 capsule by mouth 2 (Two) Times a Day. 60 capsule 3   • Thyroid (ARMOUR THYROID) 30 MG PO tablet Take 1 tablet by mouth Daily on empty stomach.  (Patient taking differently: Take 1 tablet by mouth Daily. # 2 PO QD) 90 tablet 3   • Thyroid 30 MG PO tablet Take 1 tablet by mouth twice Daily. 60 tablet 2   • vitamin D3 125 MCG (5000 UT) capsule capsule Take 2 capsules every day by oral route at dinner for 90 days.     • azithromycin (Zithromax Z-Won) 250 MG tablet Take 2 tablets by mouth on day 1, then 1 tablet daily on days 2-5 6 tablet 0   • thyroid ([place] Thyroid) 15 MG tablet Take 1 tablet by mouth Daily. 90 tablet 3     No current facility-administered medications for this visit.       PRN Meds:.    Allergies   Allergen Reactions   • Statins Myalgia       Social History     Socioeconomic History   • Marital status:    Tobacco Use   • Smoking status: Never   • Smokeless tobacco: Never   Vaping Use   • Vaping Use: Never used   Substance and Sexual Activity   • Alcohol use: Not Currently     Alcohol/week: 2.0 - 6.0 standard drinks     Types: 2 - 6 Standard drinks or equivalent per week     Comment: SOCIAL   • Drug use: No   • Sexual activity: Defer       Family History   Problem Relation Age of Onset   • Alcohol abuse Mother    • Cerebral aneurysm Father    • Alcohol abuse Father    • Alcohol abuse Maternal Grandmother    • Alcohol abuse Paternal Grandmother    • Alcohol abuse Paternal Grandfather    • Brain cancer Maternal Uncle    • Malig Hyperthermia Neg Hx        Review of Systems   Constitutional: Negative for activity change, appetite change, fatigue and unexpected weight change.   HENT: Negative for congestion, facial swelling, sore throat, trouble swallowing and voice change.    Eyes: Negative for photophobia and visual disturbance.   Respiratory: Negative for cough and choking.    Cardiovascular: Negative for chest pain.   Gastrointestinal: Negative for abdominal distention, abdominal pain, anal bleeding, blood in stool, constipation, diarrhea, nausea, rectal pain and vomiting.        GERD  Dysphagia   Endocrine: Negative for polyphagia.    Musculoskeletal: Negative for arthralgias, gait problem and joint swelling.   Skin: Negative for color change, pallor and rash.   Allergic/Immunologic: Negative for food allergies.   Neurological: Negative for speech difficulty and headaches.   Hematological: Does not bruise/bleed easily.   Psychiatric/Behavioral: Negative for agitation, confusion and sleep disturbance.       Vitals:    11/08/22 1008   BP: 112/77   Pulse: 92   Temp: 96.4 °F (35.8 °C)   SpO2: 95%       Physical Exam  Vitals reviewed.   Constitutional:       General: She is not in acute distress.     Appearance: She is well-developed. She is not diaphoretic.   HENT:      Head: Normocephalic.      Mouth/Throat:      Pharynx: No oropharyngeal exudate.   Eyes:      General: No scleral icterus.     Conjunctiva/sclera: Conjunctivae normal.   Neck:      Thyroid: No thyromegaly.   Cardiovascular:      Rate and Rhythm: Normal rate and regular rhythm.      Heart sounds: No murmur heard.  Pulmonary:      Effort: No respiratory distress.      Breath sounds: Normal breath sounds. No wheezing or rales.   Abdominal:      General: Bowel sounds are normal. There is no distension.      Palpations: Abdomen is soft. There is no mass.      Tenderness: There is no abdominal tenderness.   Musculoskeletal:         General: No tenderness. Normal range of motion.      Cervical back: Normal range of motion.   Lymphadenopathy:      Cervical: No cervical adenopathy.   Skin:     General: Skin is warm and dry.      Findings: No erythema or rash.   Neurological:      Mental Status: She is alert and oriented to person, place, and time.   Psychiatric:         Behavior: Behavior normal.         ASSESSMENT  #1 GERD  #2 dysphagia  #3 family history      PLAN  Schedule EGD  Anticipate colonoscopy in June 2023 for screening evaluation      ICD-10-CM ICD-9-CM   1. Gastroesophageal reflux disease, unspecified whether esophagitis present  K21.9 530.81   2. Dysphagia, unspecified type   R13.10 787.20

## 2022-11-18 ENCOUNTER — TELEPHONE (OUTPATIENT)
Dept: GASTROENTEROLOGY | Facility: CLINIC | Age: 57
End: 2022-11-18

## 2022-11-18 PROBLEM — R13.10 DYSPHAGIA: Status: ACTIVE | Noted: 2022-11-18

## 2022-11-18 NOTE — TELEPHONE ENCOUNTER
ALIYAH patient via telephone for. Scheduled 01/04/2023 with arrival time of 2pm. Prep paperwork mailed to verified address on file. Patient advised arrival time may change based on [facility] guidelines. ALIYAH HOOVER

## 2022-11-18 NOTE — TELEPHONE ENCOUNTER
Caller: Echo Montelongo    Relationship: Self    Best call back number: [phone]    Who are you requesting to speak with (clinical staff, provider,  specific staff member): CLINICAL STAFF    What was the call regarding: PT HAS EGD SCHEDULED 12.19.22 @ OUTSIDE FACILITY/NEEDS LOCATION TO BE CHANGED TO  ONLY DUE TO PRICE DIFFERENCE (INSURANCE)    Do you require a callback: YES

## 2022-12-02 NOTE — TELEPHONE ENCOUNTER
Called insurance and disputed the denial for visco injections since its not a covered benefit. Orthovisc injection have been approved as a one time exception. Patient has been notified and scheduled.

## 2022-12-09 ENCOUNTER — PROCEDURE VISIT (OUTPATIENT)
Dept: SPORTS MEDICINE | Facility: CLINIC | Age: 57
End: 2022-12-09

## 2022-12-09 VITALS
WEIGHT: 180 LBS | BODY MASS INDEX: 28.25 KG/M2 | OXYGEN SATURATION: 95 % | SYSTOLIC BLOOD PRESSURE: 118 MMHG | DIASTOLIC BLOOD PRESSURE: 74 MMHG | HEIGHT: 67 IN | TEMPERATURE: 98 F | RESPIRATION RATE: 16 BRPM | HEART RATE: 119 BPM

## 2022-12-09 DIAGNOSIS — M22.42 CHONDROMALACIA OF LEFT PATELLA: Primary | ICD-10-CM

## 2022-12-09 PROCEDURE — 20611 DRAIN/INJ JOINT/BURSA W/US: CPT | Performed by: FAMILY MEDICINE

## 2022-12-09 NOTE — PROGRESS NOTES
"Here today for Orthovisc injection, # 1/3    Knee Injection Procedure Note     Left knee injection was discussed with the patient in detail, including indication, risks, benefits, and alternatives. Verbal consent was given for the procedure. Injection was performed by physician.  Injection site was identified and cleaned with Betadine and alcohol swabs. Prior to needle insertion, ethyl chloride spray was used for surface anesthesia. Sterile technique was used.  Ultrasound guidance was used for the procedure for confirmation of needle placement and patient comfort.    Needle used: 25G 1.5\"   Orthovisc was passed into the joint space without difficulty. The needle was removed and a simple bandage was applied. The procedure was tolerated well without difficulty.    Diagnoses and all orders for this visit:    Chondromalacia of left patella  -     Hyaluronan (ORTHOVISC) injection 30 mg        Injection tolerated well. Follow up next week for subsequent injection.   "

## 2022-12-16 ENCOUNTER — PROCEDURE VISIT (OUTPATIENT)
Dept: SPORTS MEDICINE | Facility: CLINIC | Age: 57
End: 2022-12-16

## 2022-12-16 ENCOUNTER — APPOINTMENT (OUTPATIENT)
Dept: MAMMOGRAPHY | Facility: HOSPITAL | Age: 57
End: 2022-12-16

## 2022-12-16 VITALS
OXYGEN SATURATION: 99 % | SYSTOLIC BLOOD PRESSURE: 110 MMHG | TEMPERATURE: 97.3 F | DIASTOLIC BLOOD PRESSURE: 64 MMHG | HEART RATE: 99 BPM

## 2022-12-16 DIAGNOSIS — M22.42 CHONDROMALACIA OF LEFT PATELLA: Primary | ICD-10-CM

## 2022-12-16 PROCEDURE — 20611 DRAIN/INJ JOINT/BURSA W/US: CPT | Performed by: FAMILY MEDICINE

## 2022-12-16 NOTE — PROGRESS NOTES
"Here today for Orthovisc injection, # 2/3    Knee Injection Procedure Note     Left knee injection was discussed with the patient in detail, including indication, risks, benefits, and alternatives. Verbal consent was given for the procedure. Injection was performed by physician.  Injection site was identified and cleaned with Betadine and alcohol swabs. Prior to needle insertion, ethyl chloride spray was used for surface anesthesia. Sterile technique was used.  Ultrasound guidance was used for the procedure for confirmation of needle placement and patient comfort.    Needle used: 25G 1.5\"   Orthovisc was passed into the joint space without difficulty. The needle was removed and a simple bandage was applied. The procedure was tolerated well without difficulty.    Diagnoses and all orders for this visit:    Chondromalacia of left patella  -     Hyaluronan (ORTHOVISC) injection 30 mg        Injection tolerated well. Follow up next week for subsequent injection.   "

## 2022-12-23 ENCOUNTER — PROCEDURE VISIT (OUTPATIENT)
Dept: SPORTS MEDICINE | Facility: CLINIC | Age: 57
End: 2022-12-23

## 2022-12-23 VITALS
RESPIRATION RATE: 16 BRPM | SYSTOLIC BLOOD PRESSURE: 112 MMHG | BODY MASS INDEX: 28.25 KG/M2 | HEIGHT: 67 IN | TEMPERATURE: 98 F | HEART RATE: 85 BPM | DIASTOLIC BLOOD PRESSURE: 60 MMHG | WEIGHT: 180 LBS | OXYGEN SATURATION: 99 %

## 2022-12-23 DIAGNOSIS — M22.42 CHONDROMALACIA OF LEFT PATELLA: ICD-10-CM

## 2022-12-23 PROCEDURE — 20611 DRAIN/INJ JOINT/BURSA W/US: CPT | Performed by: FAMILY MEDICINE

## 2022-12-23 RX ORDER — TIRZEPATIDE 2.5 MG/.5ML
INJECTION, SOLUTION SUBCUTANEOUS
COMMUNITY
Start: 2022-12-16 | End: 2023-02-13

## 2022-12-23 RX ORDER — FLUTICASONE PROPIONATE 50 MCG
SPRAY, SUSPENSION (ML) NASAL
COMMUNITY
Start: 2022-12-13 | End: 2023-02-13

## 2022-12-23 RX ORDER — CLINDAMYCIN PHOSPHATE 10 MG/ML
SOLUTION TOPICAL
COMMUNITY
Start: 2022-09-12

## 2022-12-23 NOTE — PROGRESS NOTES
"Here today for Orthovisc injection, # 3/3    Knee Injection Procedure Note     Left knee injection was discussed with the patient in detail, including indication, risks, benefits, and alternatives. Verbal consent was given for the procedure. Injection was performed by physician.  Injection site was identified and cleaned with Betadine and alcohol swabs. Prior to needle insertion, ethyl chloride spray was used for surface anesthesia. Sterile technique was used.  Ultrasound guidance was used for the procedure for confirmation of needle placement and patient comfort.    Needle used: 25G 1.5\"   Orthovisc was passed into the joint space without difficulty. The needle was removed and a simple bandage was applied. The procedure was tolerated well without difficulty.    Diagnoses and all orders for this visit:    Chondromalacia of left patella  -     Visco Treatment  -     Hyaluronan (ORTHOVISC) injection 30 mg    Other orders  -     clindamycin (CLEOCIN T) 1 % swab; APPLY A THIN LAYER TO THE AFFECTED AREA(S) BY TOPICAL ROUTE 2 TIMES PER DAY  -     fluticasone (FLONASE) 50 MCG/ACT nasal spray; SHAKE LIQUID AND USE 2 SPRAYS IN EACH NOSTRIL TWICE DAILY FOR 10 DAYS  -     Mounjaro 2.5 MG/0.5ML solution pen-injector; INJECT 0.5 ML SUBCUTANEOUSLY EVERY WEEK        Injection tolerated well. This completes injection series. Patient will follow up as needed based on symptom progression.   "

## 2023-01-03 ENCOUNTER — TELEPHONE (OUTPATIENT)
Dept: GASTROENTEROLOGY | Facility: CLINIC | Age: 58
End: 2023-01-03

## 2023-01-03 NOTE — TELEPHONE ENCOUNTER
buddy     Caller: Echo Montelongo    Relationship to patient: Self    Best call back number: [phone]    Patient is needing:   PT HAS EGD SCHEDULED WITH DR. JEWELL TOMORROW 1/4/23 AND WANTS TO GO OVER PREP INSTRUCTIONS PT ALSO WANTS TO CONFIRM WHAT TIME SHE NEEDS TO BE THERE AND WHEN HER RIDE WILL NEED TO BE THERE TO  HER UP. CALL BACK ANYTIME OK TO [facility]

## 2023-01-03 NOTE — TELEPHONE ENCOUNTER
Called pt and pt is asking for her arrival time . Advised pt that she is to arrive at 2pm.  Verb understanding.

## 2023-01-04 ENCOUNTER — ANESTHESIA EVENT (OUTPATIENT)
Dept: GASTROENTEROLOGY | Facility: HOSPITAL | Age: 58
End: 2023-01-04
Payer: COMMERCIAL

## 2023-01-04 ENCOUNTER — HOSPITAL ENCOUNTER (OUTPATIENT)
Facility: HOSPITAL | Age: 58
Setting detail: HOSPITAL OUTPATIENT SURGERY
Discharge: HOME | End: 2023-01-04
Attending: INTERNAL MEDICINE | Admitting: INTERNAL MEDICINE
Payer: COMMERCIAL

## 2023-01-04 ENCOUNTER — ANESTHESIA (OUTPATIENT)
Dept: GASTROENTEROLOGY | Facility: HOSPITAL | Age: 58
End: 2023-01-04
Payer: COMMERCIAL

## 2023-01-04 VITALS
RESPIRATION RATE: 15 BRPM | BODY MASS INDEX: 27.47 KG/M2 | DIASTOLIC BLOOD PRESSURE: 83 MMHG | HEART RATE: 82 BPM | TEMPERATURE: 98.1 F | SYSTOLIC BLOOD PRESSURE: 118 MMHG | WEIGHT: 175 LBS | HEIGHT: 67 IN | OXYGEN SATURATION: 98 %

## 2023-01-04 DIAGNOSIS — K21.9 GASTROESOPHAGEAL REFLUX DISEASE, UNSPECIFIED WHETHER ESOPHAGITIS PRESENT: ICD-10-CM

## 2023-01-04 DIAGNOSIS — R13.10 DYSPHAGIA, UNSPECIFIED TYPE: ICD-10-CM

## 2023-01-04 PROCEDURE — 88305 TISSUE EXAM BY PATHOLOGIST: CPT | Performed by: INTERNAL MEDICINE

## 2023-01-04 PROCEDURE — 43249 ESOPH EGD DILATION <30 MM: CPT | Performed by: INTERNAL MEDICINE

## 2023-01-04 PROCEDURE — 25010000002 PROPOFOL 10 MG/ML EMULSION: Performed by: NURSE ANESTHETIST, CERTIFIED REGISTERED

## 2023-01-04 PROCEDURE — 87081 CULTURE SCREEN ONLY: CPT | Performed by: INTERNAL MEDICINE

## 2023-01-04 PROCEDURE — 43239 EGD BIOPSY SINGLE/MULTIPLE: CPT | Performed by: INTERNAL MEDICINE

## 2023-01-04 PROCEDURE — 25010000002 ONDANSETRON PER 1 MG: Performed by: ANESTHESIOLOGY

## 2023-01-04 PROCEDURE — C1726 CATH, BAL DIL, NON-VASCULAR: HCPCS | Performed by: INTERNAL MEDICINE

## 2023-01-04 PROCEDURE — S0260 H&P FOR SURGERY: HCPCS | Performed by: INTERNAL MEDICINE

## 2023-01-04 RX ORDER — SODIUM CHLORIDE 9 MG/ML
40 INJECTION, SOLUTION INTRAVENOUS AS NEEDED
Status: DISCONTINUED | OUTPATIENT
Start: 2023-01-04 | End: 2023-01-04 | Stop reason: HOSPADM

## 2023-01-04 RX ORDER — ONDANSETRON 2 MG/ML
4 INJECTION INTRAMUSCULAR; INTRAVENOUS EVERY 6 HOURS PRN
Status: DISCONTINUED | OUTPATIENT
Start: 2023-01-04 | End: 2023-01-04 | Stop reason: HOSPADM

## 2023-01-04 RX ORDER — PROPOFOL 10 MG/ML
VIAL (ML) INTRAVENOUS CONTINUOUS PRN
Status: DISCONTINUED | OUTPATIENT
Start: 2023-01-04 | End: 2023-01-04 | Stop reason: SURG

## 2023-01-04 RX ORDER — SODIUM CHLORIDE 0.9 % (FLUSH) 0.9 %
10 SYRINGE (ML) INJECTION AS NEEDED
Status: DISCONTINUED | OUTPATIENT
Start: 2023-01-04 | End: 2023-01-04 | Stop reason: HOSPADM

## 2023-01-04 RX ORDER — SODIUM CHLORIDE, SODIUM LACTATE, POTASSIUM CHLORIDE, CALCIUM CHLORIDE 600; 310; 30; 20 MG/100ML; MG/100ML; MG/100ML; MG/100ML
30 INJECTION, SOLUTION INTRAVENOUS CONTINUOUS PRN
Status: DISCONTINUED | OUTPATIENT
Start: 2023-01-04 | End: 2023-01-04 | Stop reason: HOSPADM

## 2023-01-04 RX ORDER — SODIUM CHLORIDE, SODIUM LACTATE, POTASSIUM CHLORIDE, CALCIUM CHLORIDE 600; 310; 30; 20 MG/100ML; MG/100ML; MG/100ML; MG/100ML
30 INJECTION, SOLUTION INTRAVENOUS CONTINUOUS
Status: DISCONTINUED | OUTPATIENT
Start: 2023-01-04 | End: 2023-01-04 | Stop reason: HOSPADM

## 2023-01-04 RX ORDER — SODIUM CHLORIDE 0.9 % (FLUSH) 0.9 %
10 SYRINGE (ML) INJECTION EVERY 12 HOURS SCHEDULED
Status: DISCONTINUED | OUTPATIENT
Start: 2023-01-04 | End: 2023-01-04 | Stop reason: HOSPADM

## 2023-01-04 RX ORDER — LIDOCAINE HYDROCHLORIDE 20 MG/ML
INJECTION, SOLUTION INFILTRATION; PERINEURAL AS NEEDED
Status: DISCONTINUED | OUTPATIENT
Start: 2023-01-04 | End: 2023-01-04 | Stop reason: SURG

## 2023-01-04 RX ADMIN — SODIUM CHLORIDE, POTASSIUM CHLORIDE, SODIUM LACTATE AND CALCIUM CHLORIDE 30 ML/HR: 600; 310; 30; 20 INJECTION, SOLUTION INTRAVENOUS at 13:49

## 2023-01-04 RX ADMIN — PROPOFOL 200 MCG/KG/MIN: 10 INJECTION, EMULSION INTRAVENOUS at 14:08

## 2023-01-04 RX ADMIN — ONDANSETRON 4 MG: 2 INJECTION INTRAMUSCULAR; INTRAVENOUS at 13:49

## 2023-01-04 RX ADMIN — LIDOCAINE HYDROCHLORIDE 50 MG: 20 INJECTION, SOLUTION INFILTRATION; PERINEURAL at 14:08

## 2023-01-04 NOTE — ANESTHESIA PREPROCEDURE EVALUATION
Anesthesia Evaluation     Patient summary reviewed and Nursing notes reviewed   history of anesthetic complications: PONV  NPO Solid Status: > 8 hours  NPO Liquid Status: > 4 hours           Airway   Mallampati: II  Dental      Pulmonary - negative pulmonary ROS and normal exam   Cardiovascular - normal exam    (+) hyperlipidemia,       Neuro/Psych- negative ROS  GI/Hepatic/Renal/Endo    (+)  GERD,  renal disease stones, thyroid problem hypothyroidism    Musculoskeletal     Abdominal    Substance History      OB/GYN          Other                        Anesthesia Plan    ASA 1     MAC     intravenous induction     Anesthetic plan, risks, benefits, and alternatives have been provided, discussed and informed consent has been obtained with: patient.        CODE STATUS:

## 2023-01-04 NOTE — ANESTHESIA POSTPROCEDURE EVALUATION
Patient: Echo Montelongo    Procedure Summary     Date: 01/04/23 Room / Location:  [facility]    Anesthesia Start: 1405 Anesthesia Stop: 1431    Procedure: ESOPHAGOGASTRODUODENOSCOPY WITH BIOPSIES AND 15 MM, 16.5 MM, AND 18 MM BALLOON DILATATION. (Esophagus) Diagnosis:       Esophagitis      Esophageal stenosis      Hiatal hernia      Gastritis      (Gastroesophageal reflux disease, unspecified whether esophagitis present [K21.9])      (Dysphagia, unspecified type [R13.10])    Surgeons: Frandy Cisneros MD Provider: Forest Murdock MD    Anesthesia Type: MAC ASA Status: 1          Anesthesia Type: MAC    Vitals  Vitals Value Taken Time   /83 01/04/23 1447   Temp     Pulse 82 01/04/23 1447   Resp 15 01/04/23 1447   SpO2 98 % 01/04/23 1447           Post Anesthesia Care and Evaluation    Patient location during evaluation: bedside  Patient participation: complete - patient participated  Level of consciousness: awake and alert  Pain management: adequate    Airway patency: patent  Anesthetic complications: No anesthetic complications    Cardiovascular status: acceptable  Respiratory status: acceptable  Hydration status: acceptable    Comments: /83 (BP Location: Left arm, Patient Position: Sitting)   Pulse 82   Temp 36.7 °C (98.1 °F) (Oral)   Resp 15   Ht 170.2 cm (67\")   Wt 79.4 kg (175 lb)   LMP  (LMP Unknown)   SpO2 98%   BMI 27.41 kg/m²

## 2023-01-04 NOTE — H&P
[facility]  Pre Procedure History & Physical    Chief Complaint:   GERD, dysphagia    Subjective     HPI:   This 57-year-old female presents to endoscopy suite for upper endoscopic evaluation.  She has issues with reflux of the longstanding nature and also some more recent problems with dysphagia to solid foods.  Previous endoscopies performed by Dr. Wilson in .    Past Medical History:   Past Medical History:   Diagnosis Date   • Abnormal Pap smear of cervix     LGSIL,22,negative hpv   • Anemia    • Disease of thyroid gland    • GERD (gastroesophageal reflux disease)    • High cholesterol     BORDERLINE NO MEDICINE   • History of reduction of orbital fracture     RIGHT   • Hypothyroidism    • PONV (postoperative nausea and vomiting)     SEVERE N/V   • Urinary incontinence        Past Surgical History:  Past Surgical History:   Procedure Laterality Date   • ABDOMINOPLASTY     • ANTERIOR AND POSTERIOR VAGINAL REPAIR N/A 2017    Procedure: PERINEOPLASTY AND EXCISION OF PERINEAL LESION;  Surgeon: Pino Ge MD;  Location: [facility];  Service:    •  SECTION     • COLONOSCOPY N/A 2013    Katie FERMIN   • COLPOSCOPY W/ BIOPSY / CURETTAGE  2015    benign   • ENDOMETRIAL ABLATION     • ENDOSCOPY  2007   • ORBITAL FRACTURE OPEN REDUCTION INTERNAL FIXATION Right 10/18/2016    Procedure: REPAIR ORBITAL FLOOR FRACTURE;  Surgeon: Ernesto Cali MD;  Location: [facility];  Service:    • ORIF WRIST FRACTURE Right 10/18/2016    Procedure: RT ULNA/RADIUS OPEN REDUCTION INTERNAL FIXATION;  Surgeon: Karen Harris MD;  Location: [facility];  Service:    • PERINEOPLASTY     • UPPER GASTROINTESTINAL ENDOSCOPY  2013    Katie FERMIN       Family History:  Family History   Problem Relation Age of Onset   • Alcohol abuse Mother    • Cerebral aneurysm Father    • Alcohol abuse Father    • Alcohol abuse Maternal Grandmother    • Alcohol abuse  Paternal Grandmother    • Alcohol abuse Paternal Grandfather    • Brain cancer Maternal Uncle    • Malig Hyperthermia Neg Hx        Social History:   reports that she has never smoked. She has never used smokeless tobacco. She reports that she does not currently use alcohol after a past usage of about 2.0 - 6.0 standard drinks per week. She reports that she does not use drugs.    Medications:   No medications prior to admission.       Allergies:  Statins    ROS:    Pertinent items are noted in HPI, all other systems reviewed and negative     Objective     not currently breastfeeding.    Physical Exam   Constitutional: Pt is oriented to person, place, and time and well-developed, well-nourished, and in no distress.   Mouth/Throat: Oropharynx is clear and moist.   Neck: Normal range of motion.   Cardiovascular: Normal rate, regular rhythm and normal heart sounds.    Pulmonary/Chest: Effort normal and breath sounds normal.   Abdominal: Soft. Nontender  Skin: Skin is warm and dry.   Psychiatric: Mood, memory, affect and judgment normal.     Assessment & Plan     Diagnosis:  GERD  Dysphagia    Anticipated Surgical Procedure:  EGD    The risks, benefits, and alternatives of this procedure have been discussed with the patient or the responsible party- the patient understands and agrees to proceed.

## 2023-01-05 LAB
LAB AP CASE REPORT: NORMAL
PATH REPORT.FINAL DX SPEC: NORMAL
PATH REPORT.GROSS SPEC: NORMAL

## 2023-01-06 ENCOUNTER — HOSPITAL ENCOUNTER (OUTPATIENT)
Dept: MAMMOGRAPHY | Facility: HOSPITAL | Age: 58
Discharge: HOME | End: 2023-01-06
Admitting: INTERNAL MEDICINE
Payer: COMMERCIAL

## 2023-01-06 DIAGNOSIS — Z12.31 SCREENING MAMMOGRAM FOR HIGH-RISK PATIENT: ICD-10-CM

## 2023-01-06 LAB — UREASE TISS QL: NEGATIVE

## 2023-01-06 PROCEDURE — 77063 BREAST TOMOSYNTHESIS BI: CPT

## 2023-01-06 PROCEDURE — 77067 SCR MAMMO BI INCL CAD: CPT

## 2023-01-25 ENCOUNTER — TELEPHONE (OUTPATIENT)
Dept: GASTROENTEROLOGY | Facility: CLINIC | Age: 58
End: 2023-01-25
Payer: COMMERCIAL

## 2023-01-27 RX ORDER — PANTOPRAZOLE SODIUM 40 MG/1
40 TABLET, DELAYED RELEASE ORAL DAILY
Qty: 30 TABLET | Refills: 3 | Status: SHIPPED | OUTPATIENT
Start: 2023-01-27 | End: 2023-02-13

## 2023-02-13 ENCOUNTER — OFFICE VISIT (OUTPATIENT)
Dept: OBSTETRICS AND GYNECOLOGY | Age: 58
End: 2023-02-13
Payer: COMMERCIAL

## 2023-02-13 VITALS
BODY MASS INDEX: 27.15 KG/M2 | SYSTOLIC BLOOD PRESSURE: 120 MMHG | WEIGHT: 173 LBS | HEIGHT: 67 IN | DIASTOLIC BLOOD PRESSURE: 72 MMHG

## 2023-02-13 DIAGNOSIS — Z01.419 WELL WOMAN EXAM WITH ROUTINE GYNECOLOGICAL EXAM: Primary | ICD-10-CM

## 2023-02-13 DIAGNOSIS — B97.7 HPV IN FEMALE: ICD-10-CM

## 2023-02-13 DIAGNOSIS — Z12.31 BREAST CANCER SCREENING BY MAMMOGRAM: ICD-10-CM

## 2023-02-13 DIAGNOSIS — Z12.4 SCREENING FOR CERVICAL CANCER: ICD-10-CM

## 2023-02-13 PROBLEM — N94.19 DYSPAREUNIA DUE TO MEDICAL CONDITION IN FEMALE: Status: RESOLVED | Noted: 2017-10-03 | Resolved: 2023-02-13

## 2023-02-13 PROCEDURE — 99396 PREV VISIT EST AGE 40-64: CPT | Performed by: OBSTETRICS & GYNECOLOGY

## 2023-02-13 RX ORDER — THIAMINE HCL 50 MG
50 TABLET ORAL DAILY
COMMUNITY

## 2023-02-13 NOTE — PROGRESS NOTES
Subjective   Chief Complaint   Patient presents with   • Gynecologic Exam     AE, last pap 2022 LSIL/ hpv neg, mg 2023, dexa 2018, csy       History of Present Illness  Wellness exam  Echo Montelongo is a very pleasant  57 y.o. female .  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    She has a history of HPV and mildly abnormal Pap smear she has had several colposcopies in the past..    Obstetric History:  OB History        3    Para   3    Term   2       1    AB        Living   2       SAB        IAB        Ectopic        Molar        Multiple        Live Births   2               Menstrual History:     No LMP recorded (lmp unknown). Patient is postmenopausal.       Sexual History:       Past Medical History:   Diagnosis Date   • Abnormal Pap smear of cervix     LGSIL,22,negative hpv   • Anemia    • Disease of thyroid gland    • GERD (gastroesophageal reflux disease)    • High cholesterol     BORDERLINE NO MEDICINE   • History of reduction of orbital fracture     RIGHT   • Hypothyroidism    • PONV (postoperative nausea and vomiting)     SEVERE N/V   • Urinary incontinence      Past Surgical History:   Procedure Laterality Date   • ABDOMINOPLASTY     • ANTERIOR AND POSTERIOR VAGINAL REPAIR N/A 2017    Procedure: PERINEOPLASTY AND EXCISION OF PERINEAL LESION;  Surgeon: Pino Ge MD;  Location: [facility];  Service:    •  SECTION     • COLONOSCOPY N/A 2013    Katie FERMIN   • COLPOSCOPY W/ BIOPSY / CURETTAGE  2015    benign   • ENDOMETRIAL ABLATION     • ENDOSCOPY  2007   • ENDOSCOPY N/A 2023    Procedure: ESOPHAGOGASTRODUODENOSCOPY WITH BIOPSIES AND 15 MM, 16.5 MM, AND  18 MM BALLOON DILATATION.;  Surgeon: Frandy Cisneros MD;  Location:  [facility];  Service: Gastroenterology;  Laterality: N/A;  pre: DYSPHAGIA, GERD  post: HIATAL HERNIA, DUODENITIS, BILE REFLUX, GASTRITIS, ESOPHAGITIS, MILD ESOPHAGEAL STENOSIS   • ORBITAL FRACTURE OPEN REDUCTION INTERNAL FIXATION Right 10/18/2016    Procedure: REPAIR ORBITAL FLOOR FRACTURE;  Surgeon: Ernesto Cali MD;  Location: [facility] MAIN OR;  Service:    • ORIF WRIST FRACTURE Right 10/18/2016    Procedure: RT ULNA/RADIUS OPEN REDUCTION INTERNAL FIXATION;  Surgeon: Karen Harris MD;  Location: [facility] MAIN OR;  Service:    • PERINEOPLASTY     • UPPER GASTROINTESTINAL ENDOSCOPY  06/14/2013    Katie FERMIN       Current Outpatient Medications:   •  Ascorbic Acid (VITAMIN C PO), Take 1 tablet by mouth Daily., Disp: , Rfl:   •  clindamycin (CLEOCIN T) 1 % swab, APPLY A THIN LAYER TO THE AFFECTED AREA(S) BY TOPICAL ROUTE 2 TIMES PER DAY, Disp: , Rfl:   •  Ferrous Sulfate (IRON PO), Take 1 tablet by mouth Daily., Disp: , Rfl:   •  Multiple Vitamin (MULTI-VITAMIN DAILY PO), Take 1 tablet by mouth Daily As Needed., Disp: , Rfl:   •  saccharomyces boulardii (FLORASTOR) 250 MG capsule, Take 1 capsule by mouth 2 (Two) Times a Day., Disp: 60 capsule, Rfl: 3  •  Thiamine HCl (vitamin B-1) 50 MG tablet, Take 50 mg by mouth Daily., Disp: , Rfl:   •  Thyroid (ARMOUR THYROID) 30 MG PO tablet, Take 1 tablet by mouth Daily on empty stomach. (Patient taking differently: Take 30 mg by mouth Daily. # 2 PO QD), Disp: 90 tablet, Rfl: 3  •  vitamin D3 125 MCG (5000 UT) capsule capsule, Take 2 capsules every day by oral route at dinner for 90 days., Disp: , Rfl:    SOCIAL Hx:  [unfilled]    The following portions of the patient's history were reviewed and updated as appropriate: allergies, current medications, past family history, past medical history, past social history, past surgical history and problem list.    Review of Systems      Urinary  "incontinence assessment discussed      Except as outlined in history of physical illness, patient denies any changes in her GYN, , GI systems.  All other systems reviewed are negative         Objective   Physical Exam    /72   Ht 170.2 cm (67\")   Wt 78.5 kg (173 lb)   LMP  (LMP Unknown)   BMI 27.10 kg/m²     General: Patient is alert and oriented and appears overall healthy  Neck: Is supple without thyromegaly, no carotid bruits and no lymphadenopathy  Lungs: Clear bilaterally, no wheezing, rhonchi, or rales.  Respiratory rate is normal  Breast: Even symmetrical, no lymphadenopathy, no retraction, no discharge ,no masses , lumps appreciated on either side  Heart: Regular rate and rhythm are appreciated, no murmurs or rubs are heard  Abdomen: Is soft, without organomegaly, bowel sounds are positive, there is no rebound or guarding and palpation does not produce any discomfort  Back: Nontender without CVA tenderness  Pelvic: External genitalia appear normal and consistent with mature female.  BUS normal                Urethra appears normal and without mass, bladder is nontender and without any lesions                        Urethral meatus is normal without scarring tenderness or masses                 Bladder is without tenderness or fullness                           Vagina is clean dry without discharge and , no lesions or masses are present                         Cervix is noninflamed without discharge or lesions.  There is no cervical motion tenderness.                Uterus is nonenlarged, without tenderness, and no masses or abnormalities are  present               Adnexa are non-enlarged, non tender               Rectal digital  exam reveals adequate sphincter tone and no masses or lesions are appreciated on digital rectal examination.       Patient Active Problem List   Diagnosis   • Keratitis sicca, both eyes (HCC)   • Rosacea blepharoconjunctivitis   • Chronic fatigue   • Alopecia areata   • " GERD (gastroesophageal reflux disease)   • Spina bifida occulta   • Mixed hyperlipidemia   • HPV in female   • Calculus of gallbladder without cholecystitis   • Calculus of kidney   • Dysphagia                Assessment & Plan   Diagnoses and all orders for this visit:    1. Well woman exam with routine gynecological exam (Primary)  -     IGP, Apt HPV,rfx 16 / 18,45    2. Breast cancer screening by mammogram    3. Screening for cervical cancer    4. HPV in female  Patient's had intermittent abnormal Pap smears with HPV, high risk.  Most recent colposcopy was benign except for HPV only.  Patient has a new partner so is likely could have another recurrence of abnormal Pap.  If so we reviewed future management options including repeat colposcopy biopsy [facility].  Patient will think about all those if this Pap smear is abnormal    Discussed today's findings and concerns with patient.  Continue to recommend regular exercise including cardiovascular and resistance training as well as  breast self-exam. Wellness lab, mammography, & pap smear, in accordance with age guidelines.    I have encouraged her to call for today's test results if she has not received them within 10 days.  Patient is advised to call with any change in her condition or with any other questions, otherwise return  for annual examination.

## 2023-02-20 LAB
CYTOLOGIST CVX/VAG CYTO: NORMAL
CYTOLOGY CVX/VAG DOC CYTO: NORMAL
CYTOLOGY CVX/VAG DOC THIN PREP: NORMAL
DX ICD CODE: NORMAL
HIV 1 & 2 AB SER-IMP: NORMAL
HPV I/H RISK 4 DNA CVX QL PROBE+SIG AMP: NEGATIVE
Lab: NORMAL
OTHER STN SPEC: NORMAL
STAT OF ADQ CVX/VAG CYTO-IMP: NORMAL

## 2023-02-22 RX ORDER — LEVOTHYROXINE AND LIOTHYRONINE 19; 4.5 UG/1; UG/1
30 TABLET ORAL DAILY
Qty: 60 TABLET | Refills: 2 | Status: CANCELLED | OUTPATIENT
Start: 2023-02-20

## 2023-02-24 ENCOUNTER — TELEPHONE (OUTPATIENT)
Dept: OBSTETRICS AND GYNECOLOGY | Age: 58
End: 2023-02-24
Payer: COMMERCIAL

## 2023-02-24 NOTE — TELEPHONE ENCOUNTER
"Pt calls asking to review her pap results, states she saw her results on mychart but would like to make sure her PAP is negative/normal. PT states she saw comments on her report that state \"Endocervical and/or squamous metaplastic cells (endocervical component) are present.\" and \"cellular changes associated with inflammation are present.\" Please review pap for pt and confirm for pt what these comments mean, thank you  "

## 2023-03-17 ENCOUNTER — TELEPHONE (OUTPATIENT)
Dept: OBSTETRICS AND GYNECOLOGY | Age: 58
End: 2023-03-17

## 2023-03-17 NOTE — TELEPHONE ENCOUNTER
Provider: DR KIRTI QUEZADA  Caller: RICHELLE CHRISTIAN  Relationship to Patient: SELF  Phone Number: [phone]  Reason for Call: PATIENT STATES SINCE PAP IN FEBRUARY SHE HAS BEEN HAVING A BROWN DISCHARGE AFTER INTERCOURSE  When was the patient last seen: 2/13/23  When did it start: AFTER PAP  Where is it located: VAGINAL AREA  Characteristics of symptom/severity: NO PAIN, ONLY DISCHARGE  Timing- Is it constant or intermittent: INTERMITTENT (ALMOST ANY TIME PATIENT HAS INTERCOURSE)    PATIENT STATES MAY LEAVE A DETAILED VOICEMAIL IF SHE DOES NOT ANSWER THE PHONE

## 2023-03-20 ENCOUNTER — TELEPHONE (OUTPATIENT)
Dept: OBSTETRICS AND GYNECOLOGY | Age: 58
End: 2023-03-20
Payer: COMMERCIAL

## 2023-04-03 ENCOUNTER — OFFICE VISIT (OUTPATIENT)
Dept: OBSTETRICS AND GYNECOLOGY | Age: 58
End: 2023-04-03
Payer: COMMERCIAL

## 2023-04-03 VITALS
WEIGHT: 164 LBS | SYSTOLIC BLOOD PRESSURE: 124 MMHG | BODY MASS INDEX: 25.74 KG/M2 | HEIGHT: 67 IN | DIASTOLIC BLOOD PRESSURE: 74 MMHG

## 2023-04-03 DIAGNOSIS — N89.8 VAGINAL DISCHARGE: Primary | ICD-10-CM

## 2023-04-03 DIAGNOSIS — R31.9 URINARY TRACT INFECTION WITH HEMATURIA, SITE UNSPECIFIED: ICD-10-CM

## 2023-04-03 DIAGNOSIS — N39.0 URINARY TRACT INFECTION WITH HEMATURIA, SITE UNSPECIFIED: ICD-10-CM

## 2023-04-03 DIAGNOSIS — N84.1 ENDOCERVICAL POLYP: ICD-10-CM

## 2023-04-03 LAB
BILIRUB BLD-MCNC: NEGATIVE MG/DL
CLARITY, POC: CLEAR
COLOR UR: YELLOW
GLUCOSE UR STRIP-MCNC: NEGATIVE MG/DL
KETONES UR QL: NEGATIVE
LEUKOCYTE EST, POC: ABNORMAL
NITRITE UR-MCNC: POSITIVE MG/ML
PH UR: 5.5 [PH] (ref 5–8)
PROT UR STRIP-MCNC: NEGATIVE MG/DL
RBC # UR STRIP: ABNORMAL /UL
SP GR UR: 1.02 (ref 1–1.03)
UROBILINOGEN UR QL: NORMAL

## 2023-04-03 PROCEDURE — 99214 OFFICE O/P EST MOD 30 MIN: CPT | Performed by: OBSTETRICS & GYNECOLOGY

## 2023-04-03 PROCEDURE — 81002 URINALYSIS NONAUTO W/O SCOPE: CPT | Performed by: OBSTETRICS & GYNECOLOGY

## 2023-04-03 RX ORDER — SULFAMETHOXAZOLE AND TRIMETHOPRIM 400; 80 MG/1; MG/1
1 TABLET ORAL 2 TIMES DAILY
Qty: 14 TABLET | Refills: 0 | Status: SHIPPED | OUTPATIENT
Start: 2023-04-03 | End: 2023-04-10

## 2023-04-03 RX ORDER — THYROID,PORK 90 MG
TABLET ORAL
COMMUNITY
Start: 2023-03-31

## 2023-04-03 RX ORDER — FLUCONAZOLE 150 MG/1
150 TABLET ORAL DAILY
Qty: 3 TABLET | Refills: 0 | Status: SHIPPED | OUTPATIENT
Start: 2023-04-03 | End: 2023-04-06

## 2023-04-03 RX ORDER — TIRZEPATIDE 2.5 MG/.5ML
INJECTION, SOLUTION SUBCUTANEOUS
COMMUNITY
Start: 2023-03-06

## 2023-04-03 NOTE — PROGRESS NOTES
Subjective       History of Present Illness  Echo Montelongo is a 57 y.o. female is being seen today for some increased discharge over the past several weeks.  Sometimes associated with intercourse.  Chief Complaint   Patient presents with   • Gynecologic Exam     Gyn problem: persistent vaginal discharge since    .        The following portions of the patient's history were reviewed and updated as appropriate: allergies, current medications, past family history, past medical history, past social history, past surgical history and problem list.    PAST MEDICAL HISTORY  Past Medical History:   Diagnosis Date   • Abnormal Pap smear of cervix     LGSIL,22,negative hpv   • Anemia    • Disease of thyroid gland    • GERD (gastroesophageal reflux disease)    • High cholesterol     BORDERLINE NO MEDICINE   • History of reduction of orbital fracture     RIGHT   • Hypothyroidism    • PONV (postoperative nausea and vomiting)     SEVERE N/V   • Urinary incontinence      OB History    Para Term  AB Living   3 3 2 1   2   SAB IAB Ectopic Molar Multiple Live Births             2      # Outcome Date GA Lbr Hussain/2nd Weight Sex Delivery Anes PTL Lv   3   36w0d  3204 g (7 lb 1 oz) M    ANTHONY   2 Term    3771 g (8 lb 5 oz) F CS-LTranv   ANTHONY   1 Term              Past Surgical History:   Procedure Laterality Date   • ABDOMINOPLASTY     • ANTERIOR AND POSTERIOR VAGINAL REPAIR N/A 2017    Procedure: PERINEOPLASTY AND EXCISION OF PERINEAL LESION;  Surgeon: Pino Ge MD;  Location: [facility];  Service:    •  SECTION     • COLONOSCOPY N/A 2013    Katie FERMIN   • COLPOSCOPY W/ BIOPSY / CURETTAGE  2015    benign   • ENDOMETRIAL ABLATION     • ENDOSCOPY  2007   • ENDOSCOPY N/A 2023    Procedure: ESOPHAGOGASTRODUODENOSCOPY WITH BIOPSIES AND 15 MM, 16.5 MM, AND 18 MM BALLOON DILATATION.;  Surgeon: Frandy Cisneros MD;  Location: [facility] ENDOSCOPY;  Service:  Gastroenterology;  Laterality: N/A;  pre: DYSPHAGIA, GERD  post: HIATAL HERNIA, DUODENITIS, BILE REFLUX, GASTRITIS, ESOPHAGITIS, MILD ESOPHAGEAL STENOSIS   • ORBITAL FRACTURE OPEN REDUCTION INTERNAL FIXATION Right 10/18/2016    Procedure: REPAIR ORBITAL FLOOR FRACTURE;  Surgeon: Ernesto Cali MD;  Location: [facility] OR;  Service:    • ORIF WRIST FRACTURE Right 10/18/2016    Procedure: RT ULNA/RADIUS OPEN REDUCTION INTERNAL FIXATION;  Surgeon: Karen Harris MD;  Location: [facility] OR;  Service:    • PERINEOPLASTY     • UPPER GASTROINTESTINAL ENDOSCOPY  06/14/2013    Katie FERMIN     Family History   Problem Relation Age of Onset   • Alcohol abuse Mother    • Cerebral aneurysm Father    • Alcohol abuse Father    • Alcohol abuse Maternal Grandmother    • Alcohol abuse Paternal Grandmother    • Alcohol abuse Paternal Grandfather    • Brain cancer Maternal Uncle    • Malig Hyperthermia Neg Hx    • Breast cancer Neg Hx      Social History     Tobacco Use   Smoking Status Never   Smokeless Tobacco Never       Current Outpatient Medications:   •  Dowell Thyroid 90 MG tablet, , Disp: , Rfl:   •  Ascorbic Acid (VITAMIN C PO), Take 1 tablet by mouth Daily., Disp: , Rfl:   •  B Complex Vitamins (VITAMIN B COMPLEX PO), Take  by mouth., Disp: , Rfl:   •  clindamycin (CLEOCIN T) 1 % swab, APPLY A THIN LAYER TO THE AFFECTED AREA(S) BY TOPICAL ROUTE 2 TIMES PER DAY, Disp: , Rfl:   •  Famotidine-Ca Carb-Mag Hydrox (PEPCID COMPLETE PO), Take  by mouth., Disp: , Rfl:   •  Ferrous Sulfate (IRON PO), Take 1 tablet by mouth Daily., Disp: , Rfl:   •  Mounjaro 2.5 MG/0.5ML solution pen-injector, INJECT 0.5 ML SUBCUTANEOUSLY EVERY WEEK, Disp: , Rfl:   •  Multiple Vitamin (MULTI-VITAMIN DAILY PO), Take 1 tablet by mouth Daily As Needed., Disp: , Rfl:   •  Thyroid 30 MG PO tablet, take 1 tablet by mouth twice daily., Disp: 60 tablet, Rfl: 2  •  vitamin D3 125 MCG (5000 UT) capsule capsule, Take 2 capsules every day by  oral route at dinner for 90 days., Disp: , Rfl:   •  fluconazole (Diflucan) 150 MG tablet, Take 1 tablet by mouth Daily for 3 days., Disp: 3 tablet, Rfl: 0  •  saccharomyces boulardii (FLORASTOR) 250 MG capsule, Take 1 capsule by mouth 2 (Two) Times a Day. (Patient not taking: Reported on 4/3/2023), Disp: 60 capsule, Rfl: 3  •  sulfamethoxazole-trimethoprim (Bactrim) 400-80 MG tablet, Take 1 tablet by mouth 2 (Two) Times a Day for 14 doses., Disp: 14 tablet, Rfl: 0  •  Thiamine HCl (vitamin B-1) 50 MG tablet, Take 50 mg by mouth Daily. (Patient not taking: Reported on 4/3/2023), Disp: , Rfl:   Immunization History   Administered Date(s) Administered   • COVID-19 (PFIZER) PURPLE CAP 01/03/2021, 01/25/2021, 11/09/2021   • Covid-19 (Pfizer) Gray Cap 06/10/2022   • FluMist 2-49yrs 09/20/2013, 11/01/2015   • Hepatitis A 06/11/2018, 01/30/2019   • Tdap 01/31/2014       Review of Systems       Except as outlined in history of physical illness, patient denies any changes in her GYN, , GI systems. All other systems reviewed are negative.    Objective   Physical Exam   Alert and oriented, respirations unlabored, heart regular rate and rhythm   Pelvic external genitalia normal female vagina is clean dry intact there is no unusual discharge mucosa looks normal.  There is a small friable lesion on the ectocervix consistent with a small endocervical polyp.  That was grasped with ring forceps.  And easily removed and sent to pathology for further study.      Assessment & Plan   Diagnoses and all orders for this visit:    1. Vaginal discharge (Primary)  -     POC Urinalysis Dipstick  -     Reference Histopathology    2. Urinary tract infection with hematuria, site unspecified  -     Reference Histopathology    3. Endocervical polyp  -     Reference Histopathology    Other orders  -     sulfamethoxazole-trimethoprim (Bactrim) 400-80 MG tablet; Take 1 tablet by mouth 2 (Two) Times a Day for 14 doses.  Dispense: 14 tablet; Refill:  0  -     fluconazole (Diflucan) 150 MG tablet; Take 1 tablet by mouth Daily for 3 days.  Dispense: 3 tablet; Refill: 0      Patient has compounding pharmacy prescribed hormone creams including estrogen and testosterone and she takes a daily progesterone           Orders Placed This Encounter   Procedures   • POC Urinalysis Dipstick     Order Specific Question:   Release to patient     Answer:   Routine Release           EMR Dragon/ Transcription disclaimer:  Much of the encounter note is an electronic transcription/translation of spoken language to printed text. The electronic translation of spoken language may permit erroneous, or at times, nonessential words or phrases to be inadvertently transcribes; Although i have reviewed the note for such errors, some may still exist.

## 2023-04-06 NOTE — PROGRESS NOTES
Please let patient know that she did indeed have a small benign polyp no other abnormalities.  That is good news

## 2023-10-02 ENCOUNTER — TELEPHONE (OUTPATIENT)
Dept: SPORTS MEDICINE | Facility: CLINIC | Age: 58
End: 2023-10-02
Payer: COMMERCIAL

## 2023-10-02 DIAGNOSIS — M22.42 CHONDROMALACIA OF LEFT PATELLA: Primary | ICD-10-CM

## 2023-10-02 NOTE — TELEPHONE ENCOUNTER
Order has been placed in the patients chart. No further action needed at this time.     -JANESSA Olson

## 2023-10-02 NOTE — TELEPHONE ENCOUNTER
Patient calling requesting to start the process for gel injection(s) in her left knee again. Not sure if needs f/up appointment first or can just start the insurance process. Please advise.

## 2023-10-03 ENCOUNTER — TRANSCRIBE ORDERS (OUTPATIENT)
Dept: ADMINISTRATIVE | Facility: HOSPITAL | Age: 58
End: 2023-10-03
Payer: COMMERCIAL

## 2023-10-03 DIAGNOSIS — M75.111 INCOMPLETE ROTATOR CUFF TEAR OR RUPTURE OF RIGHT SHOULDER, NOT SPECIFIED AS TRAUMATIC: Primary | ICD-10-CM

## 2023-10-06 ENCOUNTER — TELEPHONE (OUTPATIENT)
Dept: SPORTS MEDICINE | Facility: CLINIC | Age: 58
End: 2023-10-06

## 2023-10-06 NOTE — TELEPHONE ENCOUNTER
I called and spoke with patient - informed I was not aware of referral placement, it was not in my que.   I will work on series today and see if I can get an answer for her. I will update her when I receive a response from the insurance.   She verbally understood all information     Thanks  Dea

## 2023-10-06 NOTE — TELEPHONE ENCOUNTER
Caller: Echo Montelongo    Relationship to patient: Self    Best call back number: [phone] (home)     Patient is needing: PATIENT WAS CALLING TO GET AN UPDATE ON THE GEL INJECTIONS SHE WANTS TO GET STARTED.   THERE WAS A PREVIOUS TE SENT IN REGARDS TO THIS ON 10/2/23, PLEASE ADVISE IF INSURANCE HAS APPROVED THIS.

## 2023-10-24 ENCOUNTER — TELEPHONE (OUTPATIENT)
Dept: SPORTS MEDICINE | Facility: CLINIC | Age: 58
End: 2023-10-24
Payer: COMMERCIAL

## 2023-10-24 ENCOUNTER — HOSPITAL ENCOUNTER (OUTPATIENT)
Dept: GENERAL RADIOLOGY | Facility: HOSPITAL | Age: 58
Discharge: HOME | End: 2023-10-24
Payer: COMMERCIAL

## 2023-10-24 ENCOUNTER — HOSPITAL ENCOUNTER (OUTPATIENT)
Dept: MRI IMAGING | Facility: HOSPITAL | Age: 58
Discharge: HOME | End: 2023-10-24
Payer: COMMERCIAL

## 2023-10-24 DIAGNOSIS — M75.111 INCOMPLETE ROTATOR CUFF TEAR OR RUPTURE OF RIGHT SHOULDER, NOT SPECIFIED AS TRAUMATIC: ICD-10-CM

## 2023-10-24 PROCEDURE — 77002 NEEDLE LOCALIZATION BY XRAY: CPT

## 2023-10-24 PROCEDURE — 73222 MRI JOINT UPR EXTREM W/DYE: CPT

## 2023-10-24 RX ORDER — LIDOCAINE HYDROCHLORIDE 10 MG/ML
10 INJECTION, SOLUTION INFILTRATION; PERINEURAL
Status: DISCONTINUED | OUTPATIENT
Start: 2023-10-24 | End: 2023-10-25 | Stop reason: HOSPADM

## 2023-10-24 NOTE — TELEPHONE ENCOUNTER
Patient in office to check status of gel injection approval. Per referral appears approved, offered to schedule. Patient questioning 1 injection vs 3 injections. Says previous gel injections was series of 3. Also mentioned [employer] coworker same diagnosis had series of 3 injections. Would like more info on the difference and why 1 vs 3 injections.     Please advise.

## 2023-11-17 ENCOUNTER — HOSPITAL ENCOUNTER (OUTPATIENT)
Facility: SURGERY CENTER | Age: 58
Setting detail: HOSPITAL OUTPATIENT SURGERY
Discharge: HOME | End: 2023-11-17
Attending: SURGERY | Admitting: SURGERY
Payer: COMMERCIAL

## 2023-11-17 ENCOUNTER — ANESTHESIA (OUTPATIENT)
Dept: SURGERY | Facility: SURGERY CENTER | Age: 58
End: 2023-11-17
Payer: COMMERCIAL

## 2023-11-17 ENCOUNTER — ANESTHESIA EVENT (OUTPATIENT)
Dept: SURGERY | Facility: SURGERY CENTER | Age: 58
End: 2023-11-17
Payer: COMMERCIAL

## 2023-11-17 VITALS
RESPIRATION RATE: 16 BRPM | OXYGEN SATURATION: 97 % | WEIGHT: 161 LBS | DIASTOLIC BLOOD PRESSURE: 68 MMHG | BODY MASS INDEX: 25.27 KG/M2 | TEMPERATURE: 97 F | SYSTOLIC BLOOD PRESSURE: 110 MMHG | HEIGHT: 67 IN | HEART RATE: 69 BPM

## 2023-11-17 DIAGNOSIS — I83.812 VARICOSE VEINS OF LEFT LOWER EXTREMITY WITH PAIN: ICD-10-CM

## 2023-11-17 DIAGNOSIS — G89.18 POST-OP PAIN: Primary | ICD-10-CM

## 2023-11-17 DIAGNOSIS — I87.2 PERIPHERAL VENOUS INSUFFICIENCY: ICD-10-CM

## 2023-11-17 LAB — GLUCOSE BLDC GLUCOMTR-MCNC: 110 MG/DL (ref 70–130)

## 2023-11-17 PROCEDURE — 25010000002 PROPOFOL 10 MG/ML EMULSION: Performed by: ANESTHESIOLOGY

## 2023-11-17 PROCEDURE — 25010000002 MIDAZOLAM PER 1 MG: Performed by: ANESTHESIOLOGY

## 2023-11-17 PROCEDURE — 25810000003 LACTATED RINGERS PER 1000 ML: Performed by: SURGERY

## 2023-11-17 PROCEDURE — 88304 TISSUE EXAM BY PATHOLOGIST: CPT | Performed by: SURGERY

## 2023-11-17 PROCEDURE — 25010000002 ENOXAPARIN PER 10 MG: Performed by: SURGERY

## 2023-11-17 PROCEDURE — 25010000002 FENTANYL CITRATE (PF) 50 MCG/ML SOLUTION: Performed by: ANESTHESIOLOGY

## 2023-11-17 PROCEDURE — 25010000002 LIDOCAINE 1 % SOLUTION 20 ML VIAL: Performed by: SURGERY

## 2023-11-17 PROCEDURE — 25010000002 DROPERIDOL PER 5 MG: Performed by: ANESTHESIOLOGY

## 2023-11-17 PROCEDURE — 25010000002 DEXAMETHASONE SODIUM PHOSPHATE 20 MG/5ML SOLUTION: Performed by: ANESTHESIOLOGY

## 2023-11-17 PROCEDURE — 37766 PHLEB VEINS - EXTREM 20+: CPT | Performed by: SURGERY

## 2023-11-17 PROCEDURE — 25010000002 CEFAZOLIN SODIUM-DEXTROSE 1-4 GM-%(50ML) RECONSTITUTED SOLUTION: Performed by: ANESTHESIOLOGY

## 2023-11-17 PROCEDURE — 25010000002 PHENYLEPHRINE 10 MG/ML SOLUTION: Performed by: ANESTHESIOLOGY

## 2023-11-17 PROCEDURE — 25010000002 PROPOFOL 500 MG/50ML EMULSION: Performed by: ANESTHESIOLOGY

## 2023-11-17 RX ORDER — SODIUM CHLORIDE, SODIUM LACTATE, POTASSIUM CHLORIDE, CALCIUM CHLORIDE 600; 310; 30; 20 MG/100ML; MG/100ML; MG/100ML; MG/100ML
9 INJECTION, SOLUTION INTRAVENOUS CONTINUOUS
Status: DISCONTINUED | OUTPATIENT
Start: 2023-11-17 | End: 2023-11-17 | Stop reason: HOSPADM

## 2023-11-17 RX ORDER — HYDROCODONE BITARTRATE AND ACETAMINOPHEN 5; 325 MG/1; MG/1
1 TABLET ORAL EVERY 6 HOURS PRN
Qty: 24 TABLET | Refills: 0 | Status: SHIPPED | OUTPATIENT
Start: 2023-11-17 | End: 2023-12-11

## 2023-11-17 RX ORDER — PHENYLEPHRINE HYDROCHLORIDE 10 MG/ML
INJECTION INTRAVENOUS AS NEEDED
Status: DISCONTINUED | OUTPATIENT
Start: 2023-11-17 | End: 2023-11-17 | Stop reason: SURG

## 2023-11-17 RX ORDER — SODIUM CHLORIDE 0.9 % (FLUSH) 0.9 %
3 SYRINGE (ML) INJECTION EVERY 12 HOURS SCHEDULED
Status: DISCONTINUED | OUTPATIENT
Start: 2023-11-17 | End: 2023-11-17 | Stop reason: HOSPADM

## 2023-11-17 RX ORDER — FAMOTIDINE 10 MG/ML
20 INJECTION, SOLUTION INTRAVENOUS ONCE
Status: COMPLETED | OUTPATIENT
Start: 2023-11-17 | End: 2023-11-17

## 2023-11-17 RX ORDER — DEXAMETHASONE SODIUM PHOSPHATE 4 MG/ML
INJECTION, SOLUTION INTRA-ARTICULAR; INTRALESIONAL; INTRAMUSCULAR; INTRAVENOUS; SOFT TISSUE AS NEEDED
Status: DISCONTINUED | OUTPATIENT
Start: 2023-11-17 | End: 2023-11-17 | Stop reason: SURG

## 2023-11-17 RX ORDER — LIDOCAINE HYDROCHLORIDE 20 MG/ML
INJECTION, SOLUTION INFILTRATION; PERINEURAL AS NEEDED
Status: DISCONTINUED | OUTPATIENT
Start: 2023-11-17 | End: 2023-11-17 | Stop reason: SURG

## 2023-11-17 RX ORDER — ENOXAPARIN SODIUM 100 MG/ML
40 INJECTION SUBCUTANEOUS ONCE
Status: COMPLETED | OUTPATIENT
Start: 2023-11-17 | End: 2023-11-17

## 2023-11-17 RX ORDER — FENTANYL CITRATE 50 UG/ML
50 INJECTION, SOLUTION INTRAMUSCULAR; INTRAVENOUS ONCE AS NEEDED
Status: DISCONTINUED | OUTPATIENT
Start: 2023-11-17 | End: 2023-11-17 | Stop reason: HOSPADM

## 2023-11-17 RX ORDER — TESTOSTERONE CYPIONATE 200 MG/ML
INJECTION, SOLUTION INTRAMUSCULAR
COMMUNITY
Start: 2023-11-02

## 2023-11-17 RX ORDER — MAGNESIUM HYDROXIDE 1200 MG/15ML
LIQUID ORAL AS NEEDED
Status: DISCONTINUED | OUTPATIENT
Start: 2023-11-17 | End: 2023-11-17 | Stop reason: HOSPADM

## 2023-11-17 RX ORDER — DROPERIDOL 2.5 MG/ML
INJECTION, SOLUTION INTRAMUSCULAR; INTRAVENOUS AS NEEDED
Status: DISCONTINUED | OUTPATIENT
Start: 2023-11-17 | End: 2023-11-17 | Stop reason: SURG

## 2023-11-17 RX ORDER — ACETAMINOPHEN 160 MG
TABLET,DISINTEGRATING ORAL AS NEEDED
Status: DISCONTINUED | OUTPATIENT
Start: 2023-11-17 | End: 2023-11-17 | Stop reason: HOSPADM

## 2023-11-17 RX ORDER — MIDAZOLAM HYDROCHLORIDE 1 MG/ML
1 INJECTION INTRAMUSCULAR; INTRAVENOUS
Status: DISCONTINUED | OUTPATIENT
Start: 2023-11-17 | End: 2023-11-17 | Stop reason: HOSPADM

## 2023-11-17 RX ORDER — CEFAZOLIN SODIUM 1 G/50ML
1000 SOLUTION INTRAVENOUS EVERY 8 HOURS
Status: CANCELLED | OUTPATIENT
Start: 2023-11-17 | End: 2023-11-18

## 2023-11-17 RX ORDER — EPHEDRINE SULFATE 50 MG/ML
INJECTION INTRAVENOUS AS NEEDED
Status: DISCONTINUED | OUTPATIENT
Start: 2023-11-17 | End: 2023-11-17

## 2023-11-17 RX ORDER — LIDOCAINE HYDROCHLORIDE 10 MG/ML
0.5 INJECTION, SOLUTION INFILTRATION; PERINEURAL ONCE AS NEEDED
Status: DISCONTINUED | OUTPATIENT
Start: 2023-11-17 | End: 2023-11-17 | Stop reason: HOSPADM

## 2023-11-17 RX ORDER — SODIUM CHLORIDE 0.9 % (FLUSH) 0.9 %
3-10 SYRINGE (ML) INJECTION AS NEEDED
Status: DISCONTINUED | OUTPATIENT
Start: 2023-11-17 | End: 2023-11-17 | Stop reason: HOSPADM

## 2023-11-17 RX ORDER — CEFAZOLIN SODIUM 1 G/50ML
SOLUTION INTRAVENOUS AS NEEDED
Status: DISCONTINUED | OUTPATIENT
Start: 2023-11-17 | End: 2023-11-17 | Stop reason: SURG

## 2023-11-17 RX ORDER — FENTANYL CITRATE 50 UG/ML
INJECTION, SOLUTION INTRAMUSCULAR; INTRAVENOUS AS NEEDED
Status: DISCONTINUED | OUTPATIENT
Start: 2023-11-17 | End: 2023-11-17 | Stop reason: SURG

## 2023-11-17 RX ORDER — SODIUM CHLORIDE, SODIUM LACTATE, POTASSIUM CHLORIDE, CALCIUM CHLORIDE 600; 310; 30; 20 MG/100ML; MG/100ML; MG/100ML; MG/100ML
1000 INJECTION, SOLUTION INTRAVENOUS CONTINUOUS
Status: DISCONTINUED | OUTPATIENT
Start: 2023-11-17 | End: 2023-11-17 | Stop reason: HOSPADM

## 2023-11-17 RX ORDER — ESTRADIOL 2 MG/1
2 TABLET ORAL
COMMUNITY
Start: 2023-11-02

## 2023-11-17 RX ORDER — SODIUM CHLORIDE 0.9 % (FLUSH) 0.9 %
10 SYRINGE (ML) INJECTION AS NEEDED
Status: DISCONTINUED | OUTPATIENT
Start: 2023-11-17 | End: 2023-11-17 | Stop reason: HOSPADM

## 2023-11-17 RX ORDER — PROPOFOL 10 MG/ML
INJECTION, EMULSION INTRAVENOUS AS NEEDED
Status: DISCONTINUED | OUTPATIENT
Start: 2023-11-17 | End: 2023-11-17 | Stop reason: SURG

## 2023-11-17 RX ADMIN — FENTANYL CITRATE 25 MCG: 0.05 INJECTION, SOLUTION INTRAMUSCULAR; INTRAVENOUS at 08:22

## 2023-11-17 RX ADMIN — PHENYLEPHRINE HYDROCHLORIDE 100 MCG: 10 INJECTION INTRAVENOUS at 08:33

## 2023-11-17 RX ADMIN — MIDAZOLAM 1 MG: 1 INJECTION INTRAMUSCULAR; INTRAVENOUS at 08:10

## 2023-11-17 RX ADMIN — PROPOFOL 120 MCG/KG/MIN: 10 INJECTION, EMULSION INTRAVENOUS at 08:21

## 2023-11-17 RX ADMIN — PROPOFOL 60 MG: 10 INJECTION, EMULSION INTRAVENOUS at 08:21

## 2023-11-17 RX ADMIN — DROPERIDOL 1.25 MG: 2.5 INJECTION, SOLUTION INTRAMUSCULAR; INTRAVENOUS at 08:18

## 2023-11-17 RX ADMIN — CEFAZOLIN SODIUM 1 G: 1 SOLUTION INTRAVENOUS at 08:18

## 2023-11-17 RX ADMIN — ENOXAPARIN SODIUM 40 MG: 40 INJECTION SUBCUTANEOUS at 09:43

## 2023-11-17 RX ADMIN — PHENYLEPHRINE HYDROCHLORIDE 100 MCG: 10 INJECTION INTRAVENOUS at 08:43

## 2023-11-17 RX ADMIN — LIDOCAINE HYDROCHLORIDE 60 MG: 20 INJECTION, SOLUTION EPIDURAL; INFILTRATION; INTRACAUDAL; PERINEURAL at 08:21

## 2023-11-17 RX ADMIN — FENTANYL CITRATE 25 MCG: 0.05 INJECTION, SOLUTION INTRAMUSCULAR; INTRAVENOUS at 09:23

## 2023-11-17 RX ADMIN — SODIUM CHLORIDE, POTASSIUM CHLORIDE, SODIUM LACTATE AND CALCIUM CHLORIDE 1000 ML: 600; 310; 30; 20 INJECTION, SOLUTION INTRAVENOUS at 07:16

## 2023-11-17 RX ADMIN — DEXAMETHASONE SODIUM PHOSPHATE 6 MG: 4 INJECTION, SOLUTION INTRAMUSCULAR; INTRAVENOUS at 08:23

## 2023-11-17 RX ADMIN — FAMOTIDINE 20 MG: 10 INJECTION, SOLUTION INTRAVENOUS at 07:59

## 2023-11-17 NOTE — DISCHARGE INSTRUCTIONS
Remove dressings in the morning.   Shower.   Compression stockings daily, off at night x 2 weeks.   See Dr. Brown in 2-3 weeks.   Ambulate frequently.

## 2023-11-17 NOTE — ANESTHESIA POSTPROCEDURE EVALUATION
"Patient: Echo Montelongo    Procedure Summary       Date: 11/17/23 Room / [facility]    Anesthesia Start: 0815 Anesthesia Stop: 0939    Procedure: LEFT LEG PHLEBECTOMY (Left) Diagnosis:       Varicose veins of left lower extremity with pain      Peripheral venous insufficiency      (Varicose veins of left lower extremity with pain [I83.812])      (Peripheral venous insufficiency [I87.2])    Surgeons: Dina Brown MD Provider: Moses Garcia DO    Anesthesia Type: MAC ASA Status: 2            Anesthesia Type: MAC    Vitals  Vitals Value Taken Time   /68 11/17/23 0958   Temp 36.1 °C (97 °F) 11/17/23 0933   Pulse 69 11/17/23 0958   Resp 16 11/17/23 0958   SpO2 97 % 11/17/23 0958           Post Anesthesia Care and Evaluation    Patient location during evaluation: bedside  Patient participation: complete - patient participated  Level of consciousness: awake and alert  Pain management: adequate    Airway patency: patent  Anesthetic complications: No anesthetic complications  PONV Status: controlled  Cardiovascular status: acceptable and hemodynamically stable  Respiratory status: acceptable, spontaneous ventilation and nonlabored ventilation  Hydration status: acceptable    Comments: /68   Pulse 69   Temp 36.1 °C (97 °F) (Temporal)   Resp 16   Ht 170.2 cm (67\")   Wt 73 kg (161 lb)   LMP  (LMP Unknown)   SpO2 97%   BMI 25.22 kg/m²       "

## 2023-11-17 NOTE — OP NOTE
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    PROCEDURE PERFORMED: Left lower extremity microphlebectomy, greater than 20.    SURGEON: Dina Brown MD    ANESTHESIA: Monitored anesthesia care was used in addition to 0.1% lidocaine for tumescent anesthesia.    INDICATIONS: This is a 58-year-old woman with symptomatic left lower extremity non-saphenous reflux with inflammation who has failed 30 mm compression stockings, elevation, analgesics, exercise and weight loss.  She presents today for microphlebectomy of the left lower extremity.  The risks of bleeding, infection, DVT, STP, stroke, pulmonary embolism, hyperpigmentation, paresthesia, neuropathy temporary or permanent, allergy anaphylaxis, ulceration and matting were discussed. Sun, travel, and lifting restrictions were reviewed.     DESCRIPTION OF PROCEDURE: The patient was placed in the supine position on the operating room table with appropriate monitoring.  She was rolled in the right lateral decubitus position on the beanbag with careful attention to pressure points.  Following a satisfactory level of intravenous sedation, the left leg was prepped and draped in the usual sterile manner.  I infiltrated the skin and perivenous soft tissues and previously marked varices with a 21-gauge spinal needle, passed at a steep oblique angle to the skin.  A skin wheal was raised at the previously marked varices.  The dermis was incised using a 14-gauge Angiocath at the same depth and angle.  This was followed by a round blunt small vein hook.  The thin-walled varices were elevated up in the wound and grasped with a hemostat, bluntly dissected in both directions, clamped proximally and distally with the excess varix divided.  Proximal and distal stumps were ligated using 3-0 Vicryl ties and allowed to retract  "proximally.  Total distance *** in all directions in excess of 5 cm.  I ended with a complimentary 0.6% liquid polidocanol injection of scattered intradermal reticular veins and the varix across the fibular head and neck.  A 30-gauge needle was placed in the varix and negative tension confirmed intravenous position, I did a solo pressure injection blanching the varices appropriately.  The leg was washed with peroxide, rinsed with normal saline and dried.  Half-inch Steri-Strips were applied to the microphlebectomy sites numbering in excess of 20.  A protective dressing of fluff 4 x 4's, ABDs, and Kerlix wrap, 4\" and 6\" Ace wrap placed from the mid-calf to the high thigh. Padding was placed on the posterior lateral calf, knee and thigh to protect the peroneal nerve.  The patient was transferred to the recovery area in hemodynamically stable condition with intact dorsiflexion and plantar flexion bilaterally.  No nerve tissue was resected.  All sponge and instrument counts were correct.     "

## 2023-11-17 NOTE — OP NOTE
Dictation on: 11/17/2023  9:41 AM by: ARNOLDO BOND [[id number]]      "proximally.  Total distance from the fibular neckin all directions in excess of 5 cm.  I ended with a complimentary 0.6% liquid polidocanol injection of scattered intradermal reticular veins and the varix across the fibular head and neck.  A 30-gauge needle was placed in the varix and negative tension confirmed intravenous position, I did a solo pressure injection blanching the varices appropriately.  The leg was washed with peroxide, rinsed with normal saline and dried.  Half-inch Steri-Strips were applied to the microphlebectomy sites numbering in excess of 20.  A protective dressing of fluff 4 x 4's, ABDs, and Kerlix wrap, 4\" and 6\" Ace wrap placed from the mid-calf to the high thigh. Padding was placed on the posterior lateral calf, knee and thigh to protect the peroneal nerve.  The patient was transferred to the recovery area in hemodynamically stable condition with intact dorsiflexion and plantar flexion bilaterally.  No nerve tissue was resected.  All sponge and instrument counts were correct.         "

## 2023-11-18 LAB
LAB AP CASE REPORT: NORMAL
LAB AP CLINICAL INFORMATION: NORMAL
PATH REPORT.FINAL DX SPEC: NORMAL
PATH REPORT.GROSS SPEC: NORMAL

## 2023-11-22 ENCOUNTER — TELEPHONE (OUTPATIENT)
Dept: OBSTETRICS AND GYNECOLOGY | Age: 58
End: 2023-11-22
Payer: COMMERCIAL

## 2023-11-22 RX ORDER — FLUCONAZOLE 150 MG/1
150 TABLET ORAL DAILY
Qty: 3 TABLET | Refills: 0 | Status: SHIPPED | OUTPATIENT
Start: 2023-11-22 | End: 2023-11-25

## 2023-11-22 NOTE — TELEPHONE ENCOUNTER
Patient requesting 2  diflucan pills - she is having yeast infection symptoms burning , itching with white discharge . Thank you .

## 2023-12-11 ENCOUNTER — PROCEDURE VISIT (OUTPATIENT)
Dept: SPORTS MEDICINE | Facility: CLINIC | Age: 58
End: 2023-12-11
Payer: COMMERCIAL

## 2023-12-11 VITALS
OXYGEN SATURATION: 99 % | TEMPERATURE: 98.2 F | BODY MASS INDEX: 25.27 KG/M2 | WEIGHT: 161 LBS | HEART RATE: 82 BPM | DIASTOLIC BLOOD PRESSURE: 58 MMHG | HEIGHT: 67 IN | SYSTOLIC BLOOD PRESSURE: 104 MMHG

## 2023-12-11 DIAGNOSIS — M22.42 CHONDROMALACIA OF LEFT PATELLA: Primary | ICD-10-CM

## 2023-12-11 RX ORDER — PANTOPRAZOLE SODIUM 40 MG/1
40 TABLET, DELAYED RELEASE ORAL DAILY
COMMUNITY
Start: 2023-11-13

## 2023-12-11 NOTE — PROGRESS NOTES
Here today for Monovisc injection, # 1/1    - Large Joint Arthrocentesis: L knee on 12/11/2023 4:16 PM  Indications: pain  Details: 22 G needle, ultrasound-guided superolateral approach  Medications: 88 mg Hyaluronan 88 MG/4ML  Outcome: tolerated well, no immediate complications  Procedure, treatment alternatives, risks and benefits explained, specific risks discussed. Immediately prior to procedure a time out was called to verify the correct patient, procedure, equipment, support staff and site/side marked as required. Patient was prepped and draped in the usual sterile fashion.            Diagnoses and all orders for this visit:    Chondromalacia of left patella  -     - Large Joint Arthrocentesis    Other orders  -     pantoprazole (PROTONIX) 40 MG EC tablet; Take 1 tablet by mouth Daily.        Injection tolerated well. This completes injection series. Patient will follow up as needed based on symptom progression.

## 2024-01-15 ENCOUNTER — PRE-ADMISSION TESTING (OUTPATIENT)
Dept: PREADMISSION TESTING | Facility: HOSPITAL | Age: 59
End: 2024-01-15
Payer: COMMERCIAL

## 2024-01-15 ENCOUNTER — HOSPITAL ENCOUNTER (OUTPATIENT)
Dept: GENERAL RADIOLOGY | Facility: HOSPITAL | Age: 59
Discharge: HOME | End: 2024-01-15
Payer: COMMERCIAL

## 2024-01-15 VITALS
SYSTOLIC BLOOD PRESSURE: 108 MMHG | HEIGHT: 67 IN | BODY MASS INDEX: 25.58 KG/M2 | HEART RATE: 79 BPM | OXYGEN SATURATION: 98 % | WEIGHT: 163 LBS | RESPIRATION RATE: 18 BRPM | TEMPERATURE: 97.8 F | DIASTOLIC BLOOD PRESSURE: 73 MMHG

## 2024-01-15 LAB
ALBUMIN SERPL-MCNC: 4.4 G/DL (ref 3.5–5.2)
ALBUMIN/GLOB SERPL: 2 G/DL
ALP SERPL-CCNC: 79 U/L (ref 39–117)
ALT SERPL W P-5'-P-CCNC: 18 U/L (ref 1–33)
ANION GAP SERPL CALCULATED.3IONS-SCNC: 8 MMOL/L (ref 5–15)
AST SERPL-CCNC: 17 U/L (ref 1–32)
BILIRUB SERPL-MCNC: 0.4 MG/DL (ref 0–1.2)
BUN SERPL-MCNC: 11 MG/DL (ref 6–20)
BUN/CREAT SERPL: 12.9 (ref 7–25)
CALCIUM SPEC-SCNC: 9.2 MG/DL (ref 8.6–10.5)
CHLORIDE SERPL-SCNC: 106 MMOL/L (ref 98–107)
CO2 SERPL-SCNC: 26 MMOL/L (ref 22–29)
CREAT SERPL-MCNC: 0.85 MG/DL (ref 0.57–1)
DEPRECATED RDW RBC AUTO: 39.6 FL (ref 37–54)
EGFRCR SERPLBLD CKD-EPI 2021: 80 ML/MIN/1.73
ERYTHROCYTE [DISTWIDTH] IN BLOOD BY AUTOMATED COUNT: 11.8 % (ref 12.3–15.4)
GLOBULIN UR ELPH-MCNC: 2.2 GM/DL
GLUCOSE SERPL-MCNC: 110 MG/DL (ref 65–99)
HCT VFR BLD AUTO: 43.6 % (ref 34–46.6)
HGB BLD-MCNC: 14.5 G/DL (ref 12–15.9)
MCH RBC QN AUTO: 30.2 PG (ref 26.6–33)
MCHC RBC AUTO-ENTMCNC: 33.3 G/DL (ref 31.5–35.7)
MCV RBC AUTO: 90.8 FL (ref 79–97)
PLATELET # BLD AUTO: 267 10*3/MM3 (ref 140–450)
PMV BLD AUTO: 10.2 FL (ref 6–12)
POTASSIUM SERPL-SCNC: 5.1 MMOL/L (ref 3.5–5.2)
PROT SERPL-MCNC: 6.6 G/DL (ref 6–8.5)
QT INTERVAL: 389 MS
QTC INTERVAL: 417 MS
RBC # BLD AUTO: 4.8 10*6/MM3 (ref 3.77–5.28)
SODIUM SERPL-SCNC: 140 MMOL/L (ref 136–145)
WBC NRBC COR # BLD AUTO: 4.82 10*3/MM3 (ref 3.4–10.8)

## 2024-01-15 PROCEDURE — 80053 COMPREHEN METABOLIC PANEL: CPT | Performed by: ORTHOPAEDIC SURGERY

## 2024-01-15 PROCEDURE — 93005 ELECTROCARDIOGRAM TRACING: CPT

## 2024-01-15 PROCEDURE — 71046 X-RAY EXAM CHEST 2 VIEWS: CPT

## 2024-01-15 PROCEDURE — 85027 COMPLETE CBC AUTOMATED: CPT | Performed by: ORTHOPAEDIC SURGERY

## 2024-01-15 RX ORDER — CHLORPHENIRAMINE MALEATE 4 MG/1
4 TABLET ORAL EVERY 6 HOURS PRN
COMMUNITY

## 2024-01-15 RX ORDER — ESTRADIOL 0.1 MG/G
2 CREAM VAGINAL DAILY
COMMUNITY

## 2024-01-15 RX ORDER — THYROID 60 MG/1
1 TABLET ORAL DAILY
COMMUNITY
Start: 2023-12-01 | End: 2024-01-15

## 2024-01-15 NOTE — DISCHARGE INSTRUCTIONS
ARRIVE DAY OF SURGERY AS NOTIFIED THE DAY BEFORE          Take the following medications the morning of surgery:  ARMOUR THYROID      If you are on prescription narcotic pain medication to control your pain you may also take that medication the morning of surgery.    General Instructions:  Do not eat solid food after midnight the night before surgery.  You may drink clear liquids day of surgery but must stop at least one hour before your hospital arrival time.  It is beneficial for you to have a clear drink that contains carbohydrates the day of surgery.  We suggest a 12 to 20 ounce bottle of Gatorade or Powerade for non-diabetic patients or a 12 to 20 ounce bottle of G2 or Powerade Zero for diabetic patients. (Pediatric patients, are not advised to drink a 12 to 20 ounce carbohydrate drink)    Clear liquids are liquids you can see through.  Nothing red in color.     Plain water                               Sports drinks  Sodas                                   Gelatin (Jell-O)  Fruit juices without pulp such as white grape juice and apple juice  Popsicles that contain no fruit or yogurt  Tea or coffee (no cream or milk added)  Gatorade / Powerade  G2 / Powerade Zero    Infants may have breast milk up to four hours before surgery.  Infants drinking formula may drink formula up to six hours before surgery.   Patients who avoid smoking, chewing tobacco and alcohol for 4 weeks prior to surgery have a reduced risk of post-operative complications.  Quit smoking as many days before surgery as you can.  Do not smoke, use chewing tobacco or drink alcohol the day of surgery.   If applicable bring your C-PAP/ BI-PAP machine in with you to preop day of surgery.  Bring any papers given to you in the doctor’s office.  Wear clean comfortable clothes.  Do not wear contact lenses, false eyelashes or make-up.  Bring a case for your glasses.   Bring crutches or walker if applicable.  Remove all piercings.  Leave jewelry and any  other valuables at home.  Hair extensions with metal clips must be removed prior to surgery.  The Pre-Admission Testing nurse will instruct you to bring medications if unable to obtain an accurate list in Pre-Admission Testing.        Preventing a Surgical Site Infection:  For 2 to 3 days before surgery, avoid shaving with a razor because the razor can irritate skin and make it easier to develop an infection.    Any areas of open skin can increase the risk of a post-operative wound infection by allowing bacteria to enter and travel throughout the body.  Notify your surgeon if you have any skin wounds / rashes even if it is not near the expected surgical site.  The area will need assessed to determine if surgery should be delayed until it is healed.  The night prior to surgery shower using a fresh bar of anti-bacterial soap (such as Dial) and clean washcloth.  Sleep in a clean bed with clean clothing.  Do not allow pets to sleep with you.  Shower on the morning of surgery using a fresh bar of anti-bacterial soap (such as Dial) and clean washcloth.  Dry with a clean towel and dress in clean clothing.  Ask your surgeon if you will be receiving antibiotics prior to surgery.  Make sure you, your family, and all healthcare providers clean their hands with soap and water or an alcohol based hand  before caring for you or your wound.    Day of surgery:  Your arrival time is approximately two hours before your scheduled surgery time.  Upon arrival, a Pre-op nurse and Anesthesiologist will review your health history, obtain vital signs, and answer questions you may have.  The only belongings needed at this time will be a list of your home medications and if applicable your C-PAP/BI-PAP machine.  A Pre-op nurse will start an IV and you may receive medication in preparation for surgery, including something to help you relax.     Please be aware that surgery does come with discomfort.  We want to make every effort to  control your discomfort so please discuss any uncontrolled symptoms with your nurse.   Your doctor will most likely have prescribed pain medications.      If you are going home after surgery you will receive individualized written care instructions before being discharged.  A responsible adult must drive you to and from the hospital on the day of your surgery and stay with you for 24 hours.  Discharge prescriptions can be filled by the hospital pharmacy during regular pharmacy hours.  If you are having surgery late in the day/evening your prescription may be e-prescribed to your pharmacy.  Please verify your pharmacy hours or chose a 24 hour pharmacy to avoid not having access to your prescription because your pharmacy has closed for the day.    If you are staying overnight following surgery, you will be transported to your hospital room following the recovery period.  [facility] has all private rooms.    If you have any questions please call Pre-Admission Testing at [phone].  Deductibles and co-payments are collected on the day of service. Please be prepared to pay the required co-pay, deductible or deposit on the day of service as defined by your plan.    Call your surgeon immediately if you experience any of the following symptoms:  Sore Throat  Shortness of Breath or difficulty breathing  Cough  Chills  Body soreness or muscle pain  Headache  Fever  New loss of taste or smell  Do not arrive for your surgery ill.  Your procedure will need to be rescheduled to another time.  You will need to call your physician before the day of surgery to avoid any unnecessary exposure to hospital staff as well as other patients.

## 2024-01-25 ENCOUNTER — HOSPITAL ENCOUNTER (OUTPATIENT)
Facility: HOSPITAL | Age: 59
Setting detail: HOSPITAL OUTPATIENT SURGERY
Discharge: HOME | End: 2024-01-25
Attending: ORTHOPAEDIC SURGERY | Admitting: ORTHOPAEDIC SURGERY
Payer: COMMERCIAL

## 2024-01-25 ENCOUNTER — ANESTHESIA EVENT (OUTPATIENT)
Dept: PERIOP | Facility: HOSPITAL | Age: 59
End: 2024-01-25
Payer: COMMERCIAL

## 2024-01-25 ENCOUNTER — ANESTHESIA (OUTPATIENT)
Dept: PERIOP | Facility: HOSPITAL | Age: 59
End: 2024-01-25
Payer: COMMERCIAL

## 2024-01-25 VITALS
SYSTOLIC BLOOD PRESSURE: 106 MMHG | DIASTOLIC BLOOD PRESSURE: 67 MMHG | HEART RATE: 88 BPM | OXYGEN SATURATION: 96 % | TEMPERATURE: 97.5 F | RESPIRATION RATE: 16 BRPM

## 2024-01-25 DIAGNOSIS — Z98.890 S/P SHOULDER SURGERY: Primary | ICD-10-CM

## 2024-01-25 PROCEDURE — 25010000002 DEXAMETHASONE PER 1 MG: Performed by: ANESTHESIOLOGY

## 2024-01-25 PROCEDURE — 25010000002 CEFAZOLIN IN DEXTROSE 2-4 GM/100ML-% SOLUTION: Performed by: ORTHOPAEDIC SURGERY

## 2024-01-25 PROCEDURE — C1713 ANCHOR/SCREW BN/BN,TIS/BN: HCPCS | Performed by: ORTHOPAEDIC SURGERY

## 2024-01-25 PROCEDURE — 25810000003 LACTATED RINGERS PER 1000 ML: Performed by: ORTHOPAEDIC SURGERY

## 2024-01-25 PROCEDURE — 25810000003 LACTATED RINGERS PER 1000 ML: Performed by: ANESTHESIOLOGY

## 2024-01-25 PROCEDURE — 25010000002 MIDAZOLAM PER 1 MG: Performed by: ANESTHESIOLOGY

## 2024-01-25 PROCEDURE — 25010000002 EPINEPHRINE PER 0.1 MG: Performed by: ORTHOPAEDIC SURGERY

## 2024-01-25 PROCEDURE — 25010000002 PROPOFOL 10 MG/ML EMULSION: Performed by: NURSE ANESTHETIST, CERTIFIED REGISTERED

## 2024-01-25 PROCEDURE — 25010000002 ONDANSETRON PER 1 MG: Performed by: NURSE ANESTHETIST, CERTIFIED REGISTERED

## 2024-01-25 PROCEDURE — 25010000002 DROPERIDOL PER 5 MG: Performed by: NURSE ANESTHETIST, CERTIFIED REGISTERED

## 2024-01-25 PROCEDURE — 25010000002 ROPIVACAINE PER 1 MG: Performed by: ANESTHESIOLOGY

## 2024-01-25 PROCEDURE — 25010000002 FENTANYL CITRATE (PF) 50 MCG/ML SOLUTION: Performed by: ANESTHESIOLOGY

## 2024-01-25 DEVICE — SUT FW 2/0 38IN 1BLU 1BLK/WHT  AR7201: Type: IMPLANTABLE DEVICE | Site: SHOULDER | Status: FUNCTIONAL

## 2024-01-25 DEVICE — SUT FIBERLINK W/SUT TP 1.3MM WHT/BLU: Type: IMPLANTABLE DEVICE | Site: SHOULDER | Status: FUNCTIONAL

## 2024-01-25 DEVICE — SUT/ANCH FIBERTAK SFT W/3 NO2 FIBERWIRE CL: Type: IMPLANTABLE DEVICE | Site: SHOULDER | Status: FUNCTIONAL

## 2024-01-25 DEVICE — SUT FW #2 W/TPR NDL 1/2 CIR 38IN 97CM 26.5MM BLU: Type: IMPLANTABLE DEVICE | Site: SHOULDER | Status: FUNCTIONAL

## 2024-01-25 DEVICE — SUT/ANCH BIOCOMP SWIVELOCK TENODESIS 7X19.1: Type: IMPLANTABLE DEVICE | Site: SHOULDER | Status: FUNCTIONAL

## 2024-01-25 RX ORDER — OXYCODONE HYDROCHLORIDE AND ACETAMINOPHEN 5; 325 MG/1; MG/1
1 TABLET ORAL EVERY 4 HOURS PRN
Qty: 30 TABLET | Refills: 0 | Status: SHIPPED | OUTPATIENT
Start: 2024-01-25

## 2024-01-25 RX ORDER — SODIUM CHLORIDE, SODIUM LACTATE, POTASSIUM CHLORIDE, AND CALCIUM CHLORIDE .6; .31; .03; .02 G/100ML; G/100ML; G/100ML; G/100ML
IRRIGANT IRRIGATION AS NEEDED
Status: DISCONTINUED | OUTPATIENT
Start: 2024-01-25 | End: 2024-01-25 | Stop reason: HOSPADM

## 2024-01-25 RX ORDER — FENTANYL CITRATE 50 UG/ML
100 INJECTION, SOLUTION INTRAMUSCULAR; INTRAVENOUS
Status: DISCONTINUED | OUTPATIENT
Start: 2024-01-25 | End: 2024-01-25 | Stop reason: HOSPADM

## 2024-01-25 RX ORDER — IPRATROPIUM BROMIDE AND ALBUTEROL SULFATE 2.5; .5 MG/3ML; MG/3ML
3 SOLUTION RESPIRATORY (INHALATION) ONCE AS NEEDED
Status: DISCONTINUED | OUTPATIENT
Start: 2024-01-25 | End: 2024-01-25 | Stop reason: HOSPADM

## 2024-01-25 RX ORDER — EPHEDRINE SULFATE 50 MG/ML
INJECTION, SOLUTION INTRAVENOUS AS NEEDED
Status: DISCONTINUED | OUTPATIENT
Start: 2024-01-25 | End: 2024-01-25 | Stop reason: SURG

## 2024-01-25 RX ORDER — MIDAZOLAM HYDROCHLORIDE 1 MG/ML
2 INJECTION INTRAMUSCULAR; INTRAVENOUS
Status: DISCONTINUED | OUTPATIENT
Start: 2024-01-25 | End: 2024-01-25 | Stop reason: HOSPADM

## 2024-01-25 RX ORDER — FLUMAZENIL 0.1 MG/ML
0.2 INJECTION INTRAVENOUS AS NEEDED
Status: DISCONTINUED | OUTPATIENT
Start: 2024-01-25 | End: 2024-01-25 | Stop reason: HOSPADM

## 2024-01-25 RX ORDER — HYDROCODONE BITARTRATE AND ACETAMINOPHEN 5; 325 MG/1; MG/1
1 TABLET ORAL ONCE AS NEEDED
Status: DISCONTINUED | OUTPATIENT
Start: 2024-01-25 | End: 2024-01-25 | Stop reason: HOSPADM

## 2024-01-25 RX ORDER — EPHEDRINE SULFATE 50 MG/ML
5 INJECTION, SOLUTION INTRAVENOUS ONCE AS NEEDED
Status: DISCONTINUED | OUTPATIENT
Start: 2024-01-25 | End: 2024-01-25 | Stop reason: HOSPADM

## 2024-01-25 RX ORDER — PROMETHAZINE HYDROCHLORIDE 25 MG/1
25 SUPPOSITORY RECTAL ONCE AS NEEDED
Status: DISCONTINUED | OUTPATIENT
Start: 2024-01-25 | End: 2024-01-25 | Stop reason: HOSPADM

## 2024-01-25 RX ORDER — HYDRALAZINE HYDROCHLORIDE 20 MG/ML
5 INJECTION INTRAMUSCULAR; INTRAVENOUS
Status: DISCONTINUED | OUTPATIENT
Start: 2024-01-25 | End: 2024-01-25 | Stop reason: HOSPADM

## 2024-01-25 RX ORDER — PROMETHAZINE HYDROCHLORIDE 25 MG/1
25 TABLET ORAL ONCE AS NEEDED
Status: DISCONTINUED | OUTPATIENT
Start: 2024-01-25 | End: 2024-01-25 | Stop reason: HOSPADM

## 2024-01-25 RX ORDER — DIPHENHYDRAMINE HYDROCHLORIDE 50 MG/ML
12.5 INJECTION INTRAMUSCULAR; INTRAVENOUS
Status: DISCONTINUED | OUTPATIENT
Start: 2024-01-25 | End: 2024-01-25 | Stop reason: HOSPADM

## 2024-01-25 RX ORDER — SODIUM CHLORIDE 0.9 % (FLUSH) 0.9 %
3-10 SYRINGE (ML) INJECTION AS NEEDED
Status: DISCONTINUED | OUTPATIENT
Start: 2024-01-25 | End: 2024-01-25 | Stop reason: HOSPADM

## 2024-01-25 RX ORDER — LIDOCAINE HYDROCHLORIDE 20 MG/ML
INJECTION, SOLUTION INFILTRATION; PERINEURAL AS NEEDED
Status: DISCONTINUED | OUTPATIENT
Start: 2024-01-25 | End: 2024-01-25 | Stop reason: SURG

## 2024-01-25 RX ORDER — DEXAMETHASONE SODIUM PHOSPHATE 4 MG/ML
INJECTION, SOLUTION INTRA-ARTICULAR; INTRALESIONAL; INTRAMUSCULAR; INTRAVENOUS; SOFT TISSUE AS NEEDED
Status: DISCONTINUED | OUTPATIENT
Start: 2024-01-25 | End: 2024-01-25 | Stop reason: SURG

## 2024-01-25 RX ORDER — SODIUM CHLORIDE 0.9 % (FLUSH) 0.9 %
3 SYRINGE (ML) INJECTION EVERY 12 HOURS SCHEDULED
Status: DISCONTINUED | OUTPATIENT
Start: 2024-01-25 | End: 2024-01-25 | Stop reason: HOSPADM

## 2024-01-25 RX ORDER — SODIUM CHLORIDE, SODIUM LACTATE, POTASSIUM CHLORIDE, CALCIUM CHLORIDE 600; 310; 30; 20 MG/100ML; MG/100ML; MG/100ML; MG/100ML
9 INJECTION, SOLUTION INTRAVENOUS CONTINUOUS
Status: DISCONTINUED | OUTPATIENT
Start: 2024-01-25 | End: 2024-01-25 | Stop reason: HOSPADM

## 2024-01-25 RX ORDER — ONDANSETRON 2 MG/ML
4 INJECTION INTRAMUSCULAR; INTRAVENOUS ONCE AS NEEDED
Status: DISCONTINUED | OUTPATIENT
Start: 2024-01-25 | End: 2024-01-25 | Stop reason: HOSPADM

## 2024-01-25 RX ORDER — OXYCODONE AND ACETAMINOPHEN 7.5; 325 MG/1; MG/1
1 TABLET ORAL EVERY 4 HOURS PRN
Status: DISCONTINUED | OUTPATIENT
Start: 2024-01-25 | End: 2024-01-25 | Stop reason: HOSPADM

## 2024-01-25 RX ORDER — FAMOTIDINE 10 MG/ML
20 INJECTION, SOLUTION INTRAVENOUS ONCE
Status: COMPLETED | OUTPATIENT
Start: 2024-01-25 | End: 2024-01-25

## 2024-01-25 RX ORDER — HYDROMORPHONE HYDROCHLORIDE 1 MG/ML
0.5 INJECTION, SOLUTION INTRAMUSCULAR; INTRAVENOUS; SUBCUTANEOUS
Status: DISCONTINUED | OUTPATIENT
Start: 2024-01-25 | End: 2024-01-25 | Stop reason: HOSPADM

## 2024-01-25 RX ORDER — PHENYLEPHRINE HCL IN 0.9% NACL 1 MG/10 ML
SYRINGE (ML) INTRAVENOUS AS NEEDED
Status: DISCONTINUED | OUTPATIENT
Start: 2024-01-25 | End: 2024-01-25 | Stop reason: SURG

## 2024-01-25 RX ORDER — CEFAZOLIN SODIUM 2 G/100ML
2000 INJECTION, SOLUTION INTRAVENOUS ONCE
Status: COMPLETED | OUTPATIENT
Start: 2024-01-25 | End: 2024-01-25

## 2024-01-25 RX ORDER — PROPOFOL 10 MG/ML
VIAL (ML) INTRAVENOUS AS NEEDED
Status: DISCONTINUED | OUTPATIENT
Start: 2024-01-25 | End: 2024-01-25 | Stop reason: SURG

## 2024-01-25 RX ORDER — NALOXONE HCL 0.4 MG/ML
0.2 VIAL (ML) INJECTION AS NEEDED
Status: DISCONTINUED | OUTPATIENT
Start: 2024-01-25 | End: 2024-01-25 | Stop reason: HOSPADM

## 2024-01-25 RX ORDER — DROPERIDOL 2.5 MG/ML
0.62 INJECTION, SOLUTION INTRAMUSCULAR; INTRAVENOUS
Status: DISCONTINUED | OUTPATIENT
Start: 2024-01-25 | End: 2024-01-25 | Stop reason: HOSPADM

## 2024-01-25 RX ORDER — FENTANYL CITRATE 50 UG/ML
50 INJECTION, SOLUTION INTRAMUSCULAR; INTRAVENOUS
Status: DISCONTINUED | OUTPATIENT
Start: 2024-01-25 | End: 2024-01-25 | Stop reason: HOSPADM

## 2024-01-25 RX ORDER — SCOLOPAMINE TRANSDERMAL SYSTEM 1 MG/1
1 PATCH, EXTENDED RELEASE TRANSDERMAL ONCE
Status: DISCONTINUED | OUTPATIENT
Start: 2024-01-25 | End: 2024-01-25 | Stop reason: HOSPADM

## 2024-01-25 RX ORDER — LABETALOL HYDROCHLORIDE 5 MG/ML
5 INJECTION, SOLUTION INTRAVENOUS
Status: DISCONTINUED | OUTPATIENT
Start: 2024-01-25 | End: 2024-01-25 | Stop reason: HOSPADM

## 2024-01-25 RX ORDER — DEXAMETHASONE SODIUM PHOSPHATE 4 MG/ML
INJECTION, SOLUTION INTRA-ARTICULAR; INTRALESIONAL; INTRAMUSCULAR; INTRAVENOUS; SOFT TISSUE
Status: COMPLETED | OUTPATIENT
Start: 2024-01-25 | End: 2024-01-25

## 2024-01-25 RX ORDER — DROPERIDOL 2.5 MG/ML
INJECTION, SOLUTION INTRAMUSCULAR; INTRAVENOUS AS NEEDED
Status: DISCONTINUED | OUTPATIENT
Start: 2024-01-25 | End: 2024-01-25 | Stop reason: SURG

## 2024-01-25 RX ORDER — ROPIVACAINE HYDROCHLORIDE 5 MG/ML
INJECTION, SOLUTION EPIDURAL; INFILTRATION; PERINEURAL
Status: COMPLETED | OUTPATIENT
Start: 2024-01-25 | End: 2024-01-25

## 2024-01-25 RX ORDER — ONDANSETRON 2 MG/ML
INJECTION INTRAMUSCULAR; INTRAVENOUS AS NEEDED
Status: DISCONTINUED | OUTPATIENT
Start: 2024-01-25 | End: 2024-01-25 | Stop reason: SURG

## 2024-01-25 RX ADMIN — PROPOFOL 200 MG: 10 INJECTION, EMULSION INTRAVENOUS at 10:52

## 2024-01-25 RX ADMIN — MIDAZOLAM 2 MG: 1 INJECTION INTRAMUSCULAR; INTRAVENOUS at 10:14

## 2024-01-25 RX ADMIN — DEXAMETHASONE SODIUM PHOSPHATE 4 MG: 4 INJECTION, SOLUTION INTRA-ARTICULAR; INTRALESIONAL; INTRAMUSCULAR; INTRAVENOUS; SOFT TISSUE at 10:25

## 2024-01-25 RX ADMIN — EPHEDRINE SULFATE 10 MG: 50 INJECTION INTRAVENOUS at 12:05

## 2024-01-25 RX ADMIN — PROPOFOL 150 MCG/KG/MIN: 10 INJECTION, EMULSION INTRAVENOUS at 10:52

## 2024-01-25 RX ADMIN — LIDOCAINE HYDROCHLORIDE 100 MG: 20 INJECTION, SOLUTION INFILTRATION; PERINEURAL at 10:52

## 2024-01-25 RX ADMIN — ROPIVACAINE HYDROCHLORIDE 20 ML: 5 INJECTION EPIDURAL; INFILTRATION; PERINEURAL at 10:25

## 2024-01-25 RX ADMIN — Medication 200 MCG: at 11:51

## 2024-01-25 RX ADMIN — FAMOTIDINE 20 MG: 10 INJECTION INTRAVENOUS at 10:07

## 2024-01-25 RX ADMIN — Medication 200 MCG: at 11:56

## 2024-01-25 RX ADMIN — SCOPALAMINE 1 PATCH: 1 PATCH, EXTENDED RELEASE TRANSDERMAL at 09:54

## 2024-01-25 RX ADMIN — EPHEDRINE SULFATE 10 MG: 50 INJECTION INTRAVENOUS at 12:01

## 2024-01-25 RX ADMIN — Medication 100 MCG: at 11:03

## 2024-01-25 RX ADMIN — SODIUM CHLORIDE, POTASSIUM CHLORIDE, SODIUM LACTATE AND CALCIUM CHLORIDE: 600; 310; 30; 20 INJECTION, SOLUTION INTRAVENOUS at 11:43

## 2024-01-25 RX ADMIN — DEXAMETHASONE SODIUM PHOSPHATE 8 MG: 4 INJECTION, SOLUTION INTRA-ARTICULAR; INTRALESIONAL; INTRAMUSCULAR; INTRAVENOUS; SOFT TISSUE at 11:20

## 2024-01-25 RX ADMIN — ONDANSETRON 4 MG: 2 INJECTION INTRAMUSCULAR; INTRAVENOUS at 12:04

## 2024-01-25 RX ADMIN — DROPERIDOL 0.62 MG: 2.5 INJECTION, SOLUTION INTRAMUSCULAR; INTRAVENOUS at 11:28

## 2024-01-25 RX ADMIN — PROPOFOL 100 MG: 10 INJECTION, EMULSION INTRAVENOUS at 11:43

## 2024-01-25 RX ADMIN — Medication 200 MCG: at 11:54

## 2024-01-25 RX ADMIN — CEFAZOLIN SODIUM 2000 MG: 2 INJECTION, SOLUTION INTRAVENOUS at 10:39

## 2024-01-25 RX ADMIN — FENTANYL CITRATE 50 MCG: 50 INJECTION, SOLUTION INTRAMUSCULAR; INTRAVENOUS at 10:14

## 2024-01-25 RX ADMIN — SODIUM CHLORIDE, POTASSIUM CHLORIDE, SODIUM LACTATE AND CALCIUM CHLORIDE 9 ML/HR: 600; 310; 30; 20 INJECTION, SOLUTION INTRAVENOUS at 10:06

## 2024-01-25 RX ADMIN — Medication 100 MCG: at 11:09

## 2024-01-25 NOTE — DISCHARGE INSTRUCTIONS
Suresh Monzon M.D.  [address] [address]  [phone]      Discharge Instructions for SHOULDER SURGERY ROTATOR CUFF OR LABRAL REPAIR    SPECIFIC POST-OP INSTRUCTIONS:  * SUTURES: Sutures are taken out 7-10 days post-op. This will be done during your first post-op appointment in our office. Please call [phone] to make your appointment.   * ICE: Ice helps to decrease both pain & swelling. Ice should be applied to the shoulder 20-25 minutes per hour while awake.   * DRESSING CHANGES: You can change dressing next day after surgery. You can remove tape, white gauze pads and small yellow gauze strips. We ask that you keep the incision clean and dry. Please use water proof Band-Aids to cover incision(s) while showering. These can be purchased at your local pharmacy.  These can be changed daily or as needed. Do not use any ointment on the incision(s).   * SHOWERING: The wound edges typically come together and seal by 3-5 days post-op if there is no drainage. Again, please use waterproof Band-Aids to cover incision(s) while showering. DO NOT SUBMERSE SHOULDER IN POOL, HOT TUB OR BATH UNTIL AT LEAST 3-4 WEEKS POST-OP (EVEN WITH WATERPROOF BANDAIDS).  * PAIN MEDICINE: You will be given a prescription for oral pain medication prior to discharge from the hospital. Please let us know if you have a sensitivity to certain pain medications prior to discharge. Additional pain medication prescriptions can be written, but must be picked up in either our [place] or [place] office locations. They can NOT be called into your pharmacy.   * ORAL ANTI-INFLAMMATORIES:   You will be asked to discontinue ALL oral anti-inflammatories 2 weeks prior to surgery. You can resume these day of surgery - AFTER YOUR PROCEDURE/ONCE YOU ARE HOME.  These can be combined with the oral pain medications safely. DO NOT TAKE ADDITIONAL TYLENOL WITH THE NARCOTIC PAIN MEDICATION (it already has Tylenol in it). If you were not taking an  "anti-inflammatory pre-op, you can start one of them first day post-op. It will help decrease pain and swelling. Typical medications and dosages are as follows:   Advil/Motrin/Ibuprofen 200mg, 4 pills every 8 hours OR Aleve/Naproxen Sodium 220mg, 2 pills every 12 hours  * FOLLOW UP APPOINTMENT: You will need to call our office [phone] and make an appointment to follow up 7-10 days after your date of surgery. We can see you back sooner if there are any problems or concerns.   * SLING: We recommend you wear the sling at ALL times, other than when showering. During your first post-op visit, we will discuss the length of time you will wear the sling based on the specific surgical procedure that was performed.   * WEIGHT BEARING: We ask that you be non-weight bearing on the operative shoulder. Do not use the operative arm to lift/push/pull greater than 5lbs.   * PHYSICAL THERAPY: During your first post-op visit, again, depending on your specific procedure, we will discuss when you will start physical therapy. For a debridement/\"clean up\", physical therapy typically starts 7-10 days post-op. For a Bankart/labral/rotator cuff repair, physical therapy starts around 4-6 weeks. This can be done downstairs at [facility] or at a location of your choice. Physical therapy is typically 2-3 times a week for 4-6 weeks, depending on the procedure, the individual, and his/her progress.   * DRIVING A CAR: Medically/legally we cannot recommend you drive a car while in the sling or while on pain medication.   * RETURNING TO DAILY AND RECREATIONAL ACTIVITIES: For the most part patients can progress to daily activities as tolerated (keeping in mind the restrictions listed above). Initially, we do not want you to \"overdo\" it in an attempt to minimize post-op pain and swelling. Once the swelling is controlled, you can progress with activities as tolerated. Pain and swelling should be your guide to increasing your activity level.   * RETURN " TO WORK: The return to work date depends on many factors and is very dependent on the individual. You would most likely be able to return to a sedentary job after your first post-op visit (5-7 days after surgery). We can discuss specific work restrictions based on your job duties during this visit. A more physical job would obviously require a longer recovery time before return to work.          What to expect after a Nerve Block    Nerve blocks administered to block pain affect many types of nerves, including those nerves that control movement, pain, and normal sensation. Following a nerve block, you may notice some bruising at the site where the block was given. You may experience sensations such as: numbness of the affected area or limb, tingling, heaviness (that is the limb feels heavy to you), weakness or inability to move the affected arm or leg, or a feeling as if your arm or leg has “fallen asleep.”     A nerve block can last from 2 to 36 hours depending on the medications used.  Usually the weakness wears off first followed by the tingling and heaviness. As the block wears off, you may begin to notice pain; however, this sequence of events may occur in any order. Typically, you will be able to move your limb before you will feel it. Once a nerve block begins to wear off, the effects are usually completely gone within 60 minutes.  If you experience continued side effects that you believe are block related for longer than 48 hours, please call your healthcare provider. Please see block-specific instructions below.    Instructions for any block involving the shoulder or arm  If you have had any kind of shoulder/arm block, you will go home with your arm in a sling. Wear the sling until the block has completely worn off. You may be required to wear it for a longer period of time per your surgeon’s recommendations.  If you have had a shoulder/arm block, it is a good idea to sleep on a recliner with pillows under  your arm.    You may experience symptoms such as:  Shortness of breath  Hoarseness   Blurry vision  Unequal pupils  Drooping of your face on the same side as the block was performed    These are side effects associated with this kind of block and should go away within 12 hours.    Note: If you have severe or prolonged shortness of breath, please seek medical assistance as soon as possible.     Protection of a “blocked” arm or leg (limb)  After a nerve block, you cannot feel pain, pressure, or extremes of temperature in the affected limb. And because of this, your blocked limb is at more risk for injury. For example, it is possible to burn your limb on an extremely hot surface without feeling it.     When resting, it is important to reposition your limb periodically to avoid prolonged pressure on it. This may require the use of pillows and padding.    While sleeping, you should avoid rolling onto the affected limb or putting too much pressure on it.     If you have a cast or tight dressing, check the color of your fingers or toes of the affected limb. Call your surgeon if they look discolored (that is, dusky, dark colored).    Use caution in cold weather. Cover your limb appropriately to protect it from the cold.      Pain Management:    Your surgeon will give you a prescription for pain medication. Begin taking this before the nerve block wears off. Bear in mind that sometimes the block can wear off in the middle of the night.

## 2024-01-25 NOTE — ANESTHESIA POSTPROCEDURE EVALUATION
Patient: Echo Montelongo    Procedure Summary       Date: 01/25/24 Room / Location: [facility] OR [address] [place] [address] [facility]    Anesthesia Start: 1045 Anesthesia Stop: 1223    Procedure: RIGHT SHOULDER ARTHROSCOPY,  LABRAL DEBRIDEMENT, BICEPS TENODESIS,  DECOMPRESSION, AND ROTATOR CUFF REPAIR (Right: Shoulder) Diagnosis:     Surgeons: Suresh Monzon MD Provider: Pino Kemp MD    Anesthesia Type: general with block ASA Status: 2            Anesthesia Type: general with block    Vitals  Vitals Value Taken Time   /52 01/25/24 1315   Temp 36.4 °C (97.5 °F) 01/25/24 1318   Pulse 75 01/25/24 1323   Resp 14 01/25/24 1315   SpO2 100 % 01/25/24 1323   Vitals shown include unfiled device data.        Post Anesthesia Care and Evaluation    Level of consciousness: awake and alert  Pain management: adequate    Airway patency: patent  Anesthetic complications: No anesthetic complications  PONV Status: controlled  Cardiovascular status: blood pressure returned to baseline and acceptable  Respiratory status: acceptable  Hydration status: acceptable

## 2024-01-25 NOTE — ANESTHESIA PROCEDURE NOTES
Airway  Urgency: elective    Date/Time: 1/25/2024 10:54 AM  Airway not difficult    General Information and Staff    Patient location during procedure: OR  CRNA/CAA: Luisa Tabor CRNA    Indications and Patient Condition  Indications for airway management: airway protection    Preoxygenated: yes  MILS not maintained throughout  Mask difficulty assessment: 1 - vent by mask    Final Airway Details  Final airway type: supraglottic airway      Successful airway: classic and LMA  Size 4     Number of attempts at approach: 1  Assessment: lips, teeth, and gum same as pre-op    Additional Comments  Inflated to seal.

## 2024-01-25 NOTE — ANESTHESIA PROCEDURE NOTES
Peripheral Block    Pre-sedation assessment completed: 1/25/2024 10:15 AM    Patient reassessed immediately prior to procedure    Patient location during procedure: pre-op  Start time: 1/25/2024 10:15 AM  Stop time: 1/25/2024 10:25 AM  Reason for block: at surgeon's request and post-op pain management  Performed by  Anesthesiologist: Jan Goodman MD  Preanesthetic Checklist  Completed: patient identified, IV checked, site marked, risks and benefits discussed, surgical consent, monitors and equipment checked, pre-op evaluation and timeout performed  Prep:  Pt Position: supine  Sterile barriers:cap, gloves, mask and sterile barriers  Prep: ChloraPrep  Patient monitoring: blood pressure monitoring, continuous pulse oximetry and EKG  Procedure    Sedation: yes  Performed under: local infiltration  Guidance:ultrasound guided    ULTRASOUND INTERPRETATION.  Using ultrasound guidance a 22 G gauge needle was placed in close proximity to the brachial plexus nerve, at which point, under ultrasound guidance anesthetic was injected in the area of the nerve and spread of the anesthesia was seen on ultrasound in close proximity thereto.  There were no abnormalities seen on ultrasound; a digital image was taken; and the patient tolerated the procedure with no complications. Images:still images obtained, printed/placed on chart (U/S used to localize the nerve)    Laterality:right  Block Type:interscalene  Injection Technique:single-shot  Needle Type:echogenic  Needle Gauge:22 G  Resistance on Injection: less than 15 psi    Medications Used: dexamethasone (DECADRON) injection - Injection   4 mg - 1/25/2024 10:25:00 AM  ropivacaine (NAROPIN) 0.5 % injection - Injection   20 mL - 1/25/2024 10:25:00 AM      Post Assessment  Injection Assessment: negative aspiration for heme, no paresthesia on injection and incremental injection  Patient Tolerance:comfortable throughout block  Complications:no

## 2024-01-25 NOTE — ANESTHESIA PREPROCEDURE EVALUATION
Anesthesia Evaluation     Patient summary reviewed   history of anesthetic complications:  PONV  NPO Solid Status: > 8 hours  NPO Liquid Status: > 4 hours           Airway   Mallampati: II  TM distance: >3 FB  Neck ROM: full  No difficulty expected  Dental - normal exam     Pulmonary     breath sounds clear to auscultation  (-) shortness of breath, recent URI, not a smoker  Cardiovascular   Exercise tolerance: good (4-7 METS)    Rhythm: regular  Rate: normal    (+) hyperlipidemia  (-) past MI, dysrhythmias, angina      Neuro/Psych  (-) seizures, CVA  GI/Hepatic/Renal/Endo    (+) GERD, renal disease (h/o)- stones, thyroid problem hypothyroidism  (-)  obesity, diabetes    Musculoskeletal     (-) neck stiffness  Abdominal    Substance History      OB/GYN          Other                      Anesthesia Plan    ASA 2     general with block       Anesthetic plan, risks, benefits, and alternatives have been provided, discussed and informed consent has been obtained with: patient.      CODE STATUS:

## 2024-01-25 NOTE — OP NOTE
ORPROCDYN@  Procedure Note    Echo Montelongo  1/25/2024    Pre-op Diagnosis:   Superior labral anterior posterior tear right shoulder  Near full-thickness supraspinatus tendon tear right shoulder  Impingement syndrome right shoulder    Post-op Diagnosis:     Post-Op Diagnosis Codes:  Superior labral anterior posterior tear right shoulder  Full-thickness rotator cuff tear right shoulder  Impingement syndrome right shoulder  Primary osteoarthritis right glenohumeral joint     Procedure:  1.  Right shoulder arthroscopy with debridement labrum, articular cartilage ligament, bursa extensive, acromion  2.  Right shoulder arthroscopic biceps tenodesis  3.  Right shoulder arthroscopic rotator cuff repair    Surgeon: Suresh Monzon MD    Assistant: RADHA Jarrett    Anesthesia: General with Block  Anesthesiologist: Pino Kemp MD  CRNA: Luisa Tabor CRNA      Staff:   Circulator: Cici Woody RN; Zeenat Hernandez RN; Neo Fabian RN  Scrub Person: Jorge L Hicks  Vendor Representative: Marv Montaño; Frandy Hill  Assistant: Rae Woodruff APRN    Indication for Assistant: A surgical assistant, RADHA Jrarett, was utilized during this procedure and was present throughout the entire procedure allowing for safe completion of the procedure, reducing overall operative time and morbidity for the patient.      Complications: None    IV antibiotic: Cefazolin    Implants: Arthrex fiber tack triple loaded x 1, Arthrex 7 mm Bio-Tenodesis screw x 1    SURGICAL APPROACH: Single Row             Procedure: The patient was taken to the operative suite.  After adequate anesthesia was established the upper extremity was prepped and draped in the usual fashion with the patient in a beachchair position.  The head was kept in a neutral position and bony prominences were padded.  A posterior portal was made.  The arthroscope was introduced into the glenohumeral joint.  An anterior  portal was made in the rotator interval and diagnostic arthroscopy commenced.  A thorough inspection of the intra-articular structures was carried out.   Extensive changes were noted in the glenohumeral joint with grade IV chondromalacia the anterior aspect of the glenoid and concurrent grade 4 changes on the central posterior inferior aspect of the humeral head.  Chondroplasty was performed along the posterior aspect of the lesion on the glenoid and circumferentially on the humeral head to remove loose torn articular cartilage fragments from the shoulder.  The biceps tendon was intact but the superior labrum and biceps labral complex were completely disrupted.  Severe tearing of the posterior labrum up to the superior aspect and anterior superior aspect of the insertion of the labrum was torn and unstable.  Extensive debridement of the labral tissues was carried out from about the 8 o'clock position up and over to the 2 o'clock position on the shoulder glenoid.  Once this was stabilized and debris was removed from the shoulder attention was also turned to the rotator cuff where there was a small full-thickness component and a somewhat larger partial-thickness insertional tear of the supraspinatus.  Attention was turned to the biceps tendon.  The biceps tendon was isolated.  An accessory anterolateral portal was made and the proximal bicipital tendon groove was exposed.  A suture loop was passed with a scorpion device through a portal site and the suture was passed through the proximal bicipital tendon.  The tendon was subsequently tenotomized at the insertion site at the superior labrum and the tendon was delivered outside of the portal and whipstitches were placed proximally.  The proximal 1-1/2 cm tendon was excised.  The suture ends were brought through a swivel lock Bio-Tenodesis device from Arthrex.  The groove was again exposed and a 7 mm reamer was used to create a socket in the proximal groove.  Irrigation  and suctioning was performed.  The tendon was then delivered into the groove and firmly fixed with a Bio-Tenodesis screw.  Good secure fixation was noted.  The arthroscope was then placed in the subacromial space and a lateral working portal was made.  Fragmented bursa was noted with impingement findings.  Arthroscopic bursectomy was performed to remove loose fragmented bursa and to open up the subacromial space to better visualize the rotator cuff and the tear.  A moderately sized anterior acromial spur was noted.  This was impinging in the anterior outlet.  A decompressive anterior acromioplasty was performed with bur, shaver, and electrocautery until a smooth type I flat acromion was achieved and good outlet decompression was noted.  The coracoacromial ligament was released.  This opened up the outlet to remove impingement findings.  Hemostasis was obtained with a cautery device.  The rotator cuff was inspected and the tendon was debrided at the tear site.  The anatomy of the tear was delineated.  The footprint on the greater tuberosity was prepared with the shaver down to a healing bony surface.  A drill hole was made in the greater tuberosity and an anchor was placed below cortical bone and well seated.  The sutures were passed across the tear with the scorpion device obtaining adequate tissue for repair.  The sutures were secured with Martínez's loop and multiple half hitches.    Photographs were obtained.  The subacromial space was vigorously irrigated and suctioned.  The instruments were removed.  The portals were closed with 4-0 nylon interrupted.  A sterile compression dressing and sling were applied.  The patient was awoken and taken to the postanesthetic recovery unit in good condition.    Estimated Blood Loss: minimal    Specimens: * No orders in the log *    Suresh Monzon MD     Date: 1/25/2024  Time: 12:08 EST

## 2024-02-01 NOTE — H&P
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 She has had prior right shoulder arthroscopy with rotator cuff debridement for partial-thickness tear. She developed some posterior shoulder pain and was concerned about her labrum. This occurred after swinging a golf club. Since that time she has had increased grinding, stiffness and soreness in the right shoulder despite physical therapy efforts. Pain levels up to 5 out of 10. She is already had previous corticosteroid injections. She is use K tape, rest and activity modifications as well but continues to be significantly symptomatic. She was sent for MRI to further evaluate and is here to review results. [facility]  Right shoulder:  Skin is normal. There is no atrophy. There is no effusion. There is no warmth. No erythema. Lymphadenopathy is negative.  Shoulder passive ROM today shows: Elevation: 150 ER(side): 40 ER(abd): 80 IR(abd): 60 IR(vert): to waist. Abduction: 100.  Shoulder strength: Elevation: 3/5 ER: 3/5 IR: 5-/5 ABD: 4/5.  Special tests: Neer test is positive. Coffey test is positive. Arc of motion is positive. Empty can test was positive.  Pulses are normal. Normal sensation. Capillary refill is normal.  Assessment / Plan  MR arthrogram right shoulder from 10/24/2023 images reviewed and independently interpreted demonstrate new full-thickness chondral loss central aspect humeral head, 13 mm wide near full-thickness supraspinatus tendon tear advance compared to previous views from prior MRI. There is extensive SLAP tear. There is obvious impingement superiorly from arthropathic spurring at the acromioclavicular joint and acromion.    Assessment: Patient has obvious progression of rotator cuff tearing involving the supraspinatus. She has  an extensive SLAP tear. She is has some evolving chondral loss of the central humeral head. She has failed a prolonged course of conservative care and operative management is indicated.    1. Nontraumatic complete rupture of rotator cuff of right shoulder  M75.121: Complete rotator cuff tear or rupture of right shoulder, not specified as traumatic    2. Anterior to posterior tear of superior glenoid labrum of right shoulder  S43.431A: Superior glenoid labrum lesion of right shoulder, initial encounter    Patient Instructions  I recommend right shoulder arthroscopy with labral debridement, biceps tenodesis, decompression and rotator cuff repair.    We discussed the benefits and risks of potential surgical intervention, as well as alternative treatments. Potential surgical risks and complications include but are not limited to bleeding, infection, failure of repaired structures to heal biologically, stiffness, recurrence, nerve or vascular injury, residual pain, and the possibility of revision surgery and prolonged convalescence. Sufficient opportunity was given to discuss the condition and treatment plan and all questions were answered for the patient. The patient agreed to proceed with the proposed surgical management.

## 2024-02-06 ENCOUNTER — TRANSCRIBE ORDERS (OUTPATIENT)
Dept: PHYSICAL THERAPY | Facility: CLINIC | Age: 59
End: 2024-02-06
Payer: COMMERCIAL

## 2024-02-06 DIAGNOSIS — Z98.890 S/P SHOULDER SURGERY: Primary | ICD-10-CM

## 2024-02-07 ENCOUNTER — TREATMENT (OUTPATIENT)
Dept: PHYSICAL THERAPY | Facility: CLINIC | Age: 59
End: 2024-02-07
Payer: COMMERCIAL

## 2024-02-07 DIAGNOSIS — Z98.890 S/P RIGHT ROTATOR CUFF REPAIR: Primary | ICD-10-CM

## 2024-02-07 DIAGNOSIS — Z74.09 IMPAIRED MOBILITY AND ADLS: ICD-10-CM

## 2024-02-07 DIAGNOSIS — R29.898 SHOULDER WEAKNESS: ICD-10-CM

## 2024-02-07 DIAGNOSIS — Z78.9 IMPAIRED MOBILITY AND ADLS: ICD-10-CM

## 2024-02-07 NOTE — PROGRESS NOTES
Physical Therapy Initial Evaluation and Plan of Care    [facility] [place] [address]  [phone] (phone)  [phone] (fax)      Patient: Echo Montelongo   : 1965  Diagnosis/ICD-10 Code:  S/P right rotator cuff repair [Z98.890]  Referring practitioner: Suresh Monzon MD  Date of Initial Visit: 2024  Today's Date: 2024  Patient seen for 1 sessions    Visit Diagnoses:    ICD-10-CM ICD-9-CM   1. S/P right rotator cuff repair  Z98.890 V45.89   2. Shoulder weakness  R29.898 719.61   3. Impaired mobility and ADLs  Z74.09 V49.89    Z78.9               Subjective Evaluation    History of Present Illness  Onset date: Oct 2023 felt pop at [employer], history of shoulder pain since .  Date of surgery: 2024  Mechanism of injury: 1.  Right shoulder arthroscopy with debridement labrum, articular cartilage ligament, bursa extensive, acromion  2.  Right shoulder arthroscopic biceps tenodesis  3.  Right shoulder arthroscopic rotator cuff repair    She has been performing pendulums 5-6 times a day and gentle counter step back stretch, assisted passive stretch   Stitches removed yesterday   She is not using her arm for ADLs.     Pain  Current pain ratin  At best pain ratin  At worst pain ratin  Location: R shoulder anterior and posterior  Quality: deep ache.  Relieving factors: ice (Tylenol, Advil)  Progression: improved    Hand dominance: right    Patient Goals  Patient goals for therapy: increased strength, decreased pain, independence with ADLs/IADLs, return to sport/leisure activities, return to work and increased motion           Objective          Observations     Additional Shoulder Observation Details  R shoulder in sling with abduction pillow  Anterior shoulder swelling  5 arthroscopic sits healing with Steri-strips in place    Palpation     Right   Hypotonic in the biceps.     Active Range of Motion   Left Shoulder   Flexion:  164 degrees   Abduction: 163 degrees   External rotation 0°: 75 degrees   External rotation 90°: 100 degrees   Internal rotation BTB: T8     Right Elbow   Flexion: WFL  Extension: Right elbow active extension: Increased stretch to biceps limiting a few degrees.     Additional Active Range of Motion Details  R shoulder AROM not tested post-operatively     Passive Range of Motion     Right Shoulder   Flexion: 120 degrees   External rotation 45°: 45 degrees     Strength/Myotome Testing     Left Shoulder     Planes of Motion   Flexion: 5   Abduction: 5   External rotation at 0°: 5   Internal rotation at 0°: 5     Additional Strength Details  R shoulder strength not tested post-operatively   She has fair isometric shoulder strength with some mild posterior to lateral shoulder pain        See Exercise, Manual, and Modality Logs for complete treatment.   Echo will continue with pendulums, flexion stretch at counter that she can feel is easier after today's session, add external rotation with dowel seated or supine     Functional Outcome Score: SPADI score is 90% disability         Assessment & Plan       Assessment  Impairments: abnormal muscle firing, abnormal muscle tone, abnormal or restricted ROM, activity intolerance, impaired physical strength, lacks appropriate home exercise program, pain with function and weight-bearing intolerance   Functional limitations: carrying objects, lifting, sleeping, pulling, pushing, uncomfortable because of pain, reaching behind back, reaching overhead and unable to perform repetitive tasks   Assessment details: Echo Montelongo is a 58 y.o. year-old right-hand dominant female referred to physical therapy for right rotator cuff repair with biceps tenodesis performed on 1/25/24. She presents with a stable clinical presentation.  She has no known comorbidities or personal factors that may affect her progress in the plan of care.  Signs and symptoms are consistent with physical therapy  diagnosis of post-operative restrictions of the right shoulder that cause right shoulder stiffness, restricted strength during initial healing, post-operative pain and swelling and impaired use of the right arm while immobilized in sling. She will benefit from progression of right shoulder rehab through surgical protocol for medium rotator cuff tear.  Prognosis: good    Goals  Plan Goals: PT Goal Recert Due Date: 05/05/24    STG Date to Achieve: 03/19/24    STG 1.  Patient will demonstrate an independent initial HEP for right shoulder assisted ROM and isometric strengthening that minimizes shoulder pain and increased ROM and isometric strength to good     STG 2  Patient will wean from sling at 8 weeks and initiate using her right arm for self-care activities performed below shoulder level to include eating, driving, and bathing    STG 3.Patient will demonstrate 80% of right shoulder PROM that prevents shoulder adhesive capsulitis and increased pain     LTG Date to Achieve: 05/05/24    LTG 1. Patient will increase her right shoulder AROM to within 10 degrees of her left shoulder with good shoulder mechanics that allows her to reach to top cabinet shelf     LTG  2. Patient will increase her right shoulder strength to at least 4/5 that allows her to lift and carry 10 pounds and reach 3 pounds overhead to perform non-strenuous household activities without assistance     LTG 3: Patient will report decreased functional disability on SPADI score from 90 % to 10 % or less       Plan  Therapy options: will be seen for skilled therapy services  Planned modality interventions: TENS, cryotherapy and electrical stimulation/Russian stimulation  Planned therapy interventions: home exercise program, therapeutic activities, stretching, strengthening, neuromuscular re-education, manual therapy and functional ROM exercises  Frequency: 2x week  Duration in weeks: 12  Treatment plan discussed with: patient        Timed:         Manual  Therapy:    15     mins  25993;     Therapeutic Exercise:    10     mins  49234;     Neuromuscular Raul:    0    mins  84071;    Therapeutic Activity:     0     mins  06747;     Gait Trainin     mins  46237;     Ultrasound:     0     mins  35397;    Self Care                       0     mins   75643      Un-Timed:  Electrical Stimulation:    0     mins  50432 ( );  Dry Needling  (1-2 muscles)            0     mins 81269 (Self-pay)  Dry Needling (3-4 muscles) 0     mins 86467 (Self-pay)  Dry Needling Trial    0     mins DRYNDLTRIAL  (No Charge)  Traction     0     mins 37187  Low Eval     20     Mins  89099  Mod Eval     0     Mins  28471  High Eval                       0     Mins  34971    Timed Treatment:   25   mins   Total Treatment:     45   mins    PT SIGNATURE: Danika Ornelas PT     [place] License Number: [id number]    Electronically signed by Danika Ornelas PT, 24, 11:08 AM EST    DATE TREATMENT INITIATED: 2024    Initial Certification  Certification Period: 2024  I certify that the therapy services are furnished while this patient is under my care.  The services outlined above are required by this patient, and will be reviewed every 90 days.     PHYSICIAN: Suresh Monzon MD   NPI: [id number]                                         DATE:     Please sign and return via fax to [phone] Thank you, [facility].

## 2024-02-15 ENCOUNTER — TREATMENT (OUTPATIENT)
Dept: PHYSICAL THERAPY | Facility: CLINIC | Age: 59
End: 2024-02-15
Payer: COMMERCIAL

## 2024-02-15 ENCOUNTER — TELEPHONE (OUTPATIENT)
Dept: OBSTETRICS AND GYNECOLOGY | Age: 59
End: 2024-02-15
Payer: COMMERCIAL

## 2024-02-15 DIAGNOSIS — R29.898 SHOULDER WEAKNESS: ICD-10-CM

## 2024-02-15 DIAGNOSIS — Z78.9 IMPAIRED MOBILITY AND ADLS: ICD-10-CM

## 2024-02-15 DIAGNOSIS — Z74.09 IMPAIRED MOBILITY AND ADLS: ICD-10-CM

## 2024-02-15 DIAGNOSIS — Z98.890 S/P RIGHT ROTATOR CUFF REPAIR: Primary | ICD-10-CM

## 2024-02-15 RX ORDER — NITROFURANTOIN 25; 75 MG/1; MG/1
100 CAPSULE ORAL 2 TIMES DAILY
Qty: 14 CAPSULE | Refills: 0 | Status: SHIPPED | OUTPATIENT
Start: 2024-02-15 | End: 2024-02-22

## 2024-02-15 RX ORDER — FLUCONAZOLE 150 MG/1
TABLET ORAL
Qty: 2 TABLET | Refills: 0 | Status: SHIPPED | OUTPATIENT
Start: 2024-02-15

## 2024-02-21 ENCOUNTER — TREATMENT (OUTPATIENT)
Dept: PHYSICAL THERAPY | Facility: CLINIC | Age: 59
End: 2024-02-21
Payer: COMMERCIAL

## 2024-02-21 DIAGNOSIS — Z74.09 IMPAIRED MOBILITY AND ADLS: ICD-10-CM

## 2024-02-21 DIAGNOSIS — R29.898 SHOULDER WEAKNESS: ICD-10-CM

## 2024-02-21 DIAGNOSIS — Z78.9 IMPAIRED MOBILITY AND ADLS: ICD-10-CM

## 2024-02-21 DIAGNOSIS — Z98.890 S/P RIGHT ROTATOR CUFF REPAIR: Primary | ICD-10-CM

## 2024-02-21 NOTE — PROGRESS NOTES
Physical Therapy Daily Treatment Note    [facility] [place]  [address] [place] [address]  [phone] (phone)  [phone] (fax)    Patient: Echo Montelongo   : 1965  Diagnosis/ICD-10 Code:  S/P right rotator cuff repair [Z98.890]  Referring practitioner: Suresh Monzon MD  Today's Date: 2024  Patient seen for 3 sessions    Visit Diagnoses:    ICD-10-CM ICD-9-CM   1. S/P right rotator cuff repair  Z98.890 V45.89   2. Shoulder weakness  R29.898 719.61   3. Impaired mobility and ADLs  Z74.09 V49.89    Z78.9               Subjective The shoulder is not popping as much but feels tight in the front and under the arm and some pain posteriorly.  External rotation stretching feels wrong and not like a regular stretch sensation.    Objective          Passive Range of Motion     Right Shoulder   Flexion: 145 degrees   External rotation 45°: Right shoulder passive external rotation at 45 degrees: Initial stop at 28 then progressing to 45 degrees.     Additional Passive Range of Motion Details  Able to extend right elbow to the side with reduced biceps stretch but feels more stretch with forearm supinated and held to her side       See Exercise, Manual, and Modality Logs for complete treatment.   Added shoulder pulleys and different angles of shoulder flexion and scaption.  Applied Kinesiotape to the anterior shoulder and deltoid with finger extensions for swelling.    Assessment/Plan    There is some mild adhesion of the anterior scar.  ROM has progressed to desired external rotation however it does not feel like a normal muscle stretch.  There is still shoulder pain likely due to extent of work and it is manageable.        Timed:    Manual Therapy:    10     mins  98951;  Therapeutic Exercise:    15     mins  95111;     Neuromuscular Raul:    0    mins  68001;    Therapeutic Activity:     0     mins  14676;     Gait Trainin     mins  10355;     Ultrasound:      0     mins  75560;    Aquatic Therapy    0     mins 65562;  Self Care                       0     mins   18626        Untimed:  Electrical Stimulation:    0     mins  12464 ( );  Traction:    0     mins  42147;   Dry Needling  (1-2 muscles)            0     mins 20560 (Self-pay)  Dry Needling (3-4 muscles) 0     20561 (Self-pay)  Dry Needling Trial    0     DRYNDLTRIAL  (No Charge)    Timed Treatment:   25   mins   Total Treatment:     25   mins    Danika Ornelas PT  Physical Therapist    [place] License:[id number]

## 2024-02-26 ENCOUNTER — OFFICE VISIT (OUTPATIENT)
Dept: OBSTETRICS AND GYNECOLOGY | Age: 59
End: 2024-02-26
Payer: COMMERCIAL

## 2024-02-26 VITALS
BODY MASS INDEX: 27 KG/M2 | SYSTOLIC BLOOD PRESSURE: 112 MMHG | DIASTOLIC BLOOD PRESSURE: 68 MMHG | WEIGHT: 172 LBS | HEIGHT: 67 IN

## 2024-02-26 DIAGNOSIS — Z00.00 ENCOUNTER FOR ANNUAL PHYSICAL EXAM: ICD-10-CM

## 2024-02-26 DIAGNOSIS — N39.0 URINARY TRACT INFECTION WITH HEMATURIA, SITE UNSPECIFIED: ICD-10-CM

## 2024-02-26 DIAGNOSIS — R31.9 URINARY TRACT INFECTION WITH HEMATURIA, SITE UNSPECIFIED: ICD-10-CM

## 2024-02-26 DIAGNOSIS — Z12.31 BREAST CANCER SCREENING BY MAMMOGRAM: ICD-10-CM

## 2024-02-26 DIAGNOSIS — Z13.89 SCREENING FOR BLOOD OR PROTEIN IN URINE: Primary | ICD-10-CM

## 2024-02-26 DIAGNOSIS — Z12.4 SCREENING FOR CERVICAL CANCER: ICD-10-CM

## 2024-02-26 LAB
BILIRUB BLD-MCNC: NEGATIVE MG/DL
CLARITY, POC: CLEAR
COLOR UR: YELLOW
GLUCOSE UR STRIP-MCNC: NEGATIVE MG/DL
KETONES UR QL: NEGATIVE
LEUKOCYTE EST, POC: NEGATIVE
NITRITE UR-MCNC: NEGATIVE MG/ML
PH UR: 6 [PH] (ref 5–8)
PROT UR STRIP-MCNC: NEGATIVE MG/DL
RBC # UR STRIP: NEGATIVE /UL
SP GR UR: 1.02 (ref 1–1.03)
UROBILINOGEN UR QL: NORMAL

## 2024-02-26 PROCEDURE — 81003 URINALYSIS AUTO W/O SCOPE: CPT | Performed by: OBSTETRICS & GYNECOLOGY

## 2024-02-26 PROCEDURE — 99459 PELVIC EXAMINATION: CPT | Performed by: OBSTETRICS & GYNECOLOGY

## 2024-02-26 PROCEDURE — 99396 PREV VISIT EST AGE 40-64: CPT | Performed by: OBSTETRICS & GYNECOLOGY

## 2024-02-26 NOTE — PROGRESS NOTES
Subjective   Chief Complaint   Patient presents with    Annual Exam     Annual exam last pap 2023 neg/neg, m/g 2023  (will schedule) , Dexa , colonoscopy , c/o more frequent urination, and recently had uti.       History of Present Illness  Wellness exam  Echo Montelongo is a very pleasant  58 y.o. female .  , Mammo Exam unable to do because of rotator cuff surgery, , Exercise will start back again when her shoulder is improved  Patient is receiving compounding hormones from an outside prescriber  She is overdue for some wellness labs and is interested in a cardiac calcium score test    She recently had a UTI was treated and fortunately urinalysis today was negative..    Obstetric History:  OB History          3    Para   3    Term   2       1    AB        Living   2         SAB        IAB        Ectopic        Molar        Multiple        Live Births   2               Menstrual History:     No LMP recorded (lmp unknown). Patient is postmenopausal.       Sexual History:       Past Medical History:   Diagnosis Date    Abnormal Pap smear of cervix     LGSIL,22,negative hpv    Anemia     Disease of thyroid gland     GERD (gastroesophageal reflux disease)     High cholesterol     BORDERLINE NO MEDICINE    History of reduction of orbital fracture     RIGHT    Hypothyroidism     Injury of back     Insulin resistance     Knee sprain     PONV (postoperative nausea and vomiting)     SEVERE N/V    Right shoulder pain     Shoulder injury     Sinus congestion     Urinary incontinence      Past Surgical History:   Procedure Laterality Date    ABDOMINOPLASTY      ANTERIOR AND POSTERIOR VAGINAL REPAIR N/A 2017    Procedure: PERINEOPLASTY AND EXCISION OF PERINEAL LESION;  Surgeon: Pino Ge MD;  Location: [facility] [place];  Service:      SECTION      COLONOSCOPY N/A 2013    Katie FERMIN    COLPOSCOPY W/ BIOPSY / CURETTAGE  2015    benign    ELBOW PROCEDURE      Forearm  fracture    ENDOMETRIAL ABLATION  2007    ENDOSCOPY  12/06/2007    ENDOSCOPY N/A 01/04/2023    Procedure: ESOPHAGOGASTRODUODENOSCOPY WITH BIOPSIES AND 15 MM, 16.5 MM, AND 18 MM BALLOON DILATATION.;  Surgeon: Frandy Cisneros MD;  Location: [facility] ENDOSCOPY;  Service: Gastroenterology;  Laterality: N/A;  pre: DYSPHAGIA, GERD  post: HIATAL HERNIA, DUODENITIS, BILE REFLUX, GASTRITIS, ESOPHAGITIS, MILD ESOPHAGEAL STENOSIS    EYE SURGERY      FRACTURE SURGERY      MICROAMBULATORY PHLEBECTOMY Left 11/17/2023    Procedure: LEFT LEG PHLEBECTOMY;  Surgeon: Dina Brown MD;  [place] [facility] [place]  Service: Vascular;  Laterality: Left;    ORBITAL FRACTURE OPEN REDUCTION INTERNAL FIXATION Right 10/18/2016    Procedure: REPAIR ORBITAL FLOOR FRACTURE;  Surgeon: Ernesto Cali MD;  Location: [facility] MAIN OR;  Service:     ORIF WRIST FRACTURE Right 10/18/2016    Procedure: RT ULNA/RADIUS OPEN REDUCTION INTERNAL FIXATION;  Surgeon: Karen Harris MD;  Location: [facility] MAIN OR;  Service:     PERINEOPLASTY      SHOULDER ARTHROSCOPY W/ ROTATOR CUFF REPAIR Right 1/25/2024    Procedure: RIGHT SHOULDER ARTHROSCOPY,  LABRAL DEBRIDEMENT, BICEPS TENODESIS,  DECOMPRESSION, AND ROTATOR CUFF REPAIR;  Surgeon: Suresh Monzon MD;  Location: [facility] OR [facility] [place];  Service: Orthopedics;  Laterality: Right;    UPPER GASTROINTESTINAL ENDOSCOPY  06/14/2013    Katie FERMIN       Current Outpatient Medications:     Ascorbic Acid (VITAMIN C PO), Take 1 tablet by mouth Daily., Disp: , Rfl:     B Complex Vitamins (VITAMIN B COMPLEX PO), Take  by mouth., Disp: , Rfl:     estradiol (ESTRACE) 0.1 MG/GM vaginal cream, Insert 2 applicators into the vagina Daily., Disp: , Rfl:     Ferrous Sulfate (IRON PO), Take 1 tablet by mouth Daily., Disp: , Rfl:     fluconazole (Diflucan) 150 MG tablet, Take 1 tab po x 1 now, repeat in 3 days., Disp: 2 tablet, Rfl: 0    metFORMIN ER (GLUCOPHAGE-XR) 500 MG 24 hr tablet, Take 1 tablet by mouth Every  "Night. (Patient taking differently: Take 1 tablet by mouth Every Evening.), Disp: 90 tablet, Rfl: 1    Multiple Vitamin (MULTI-VITAMIN DAILY PO), Take 1 tablet by mouth Daily As Needed., Disp: , Rfl:     pantoprazole (PROTONIX) 40 MG EC tablet, Take 1 tablet by mouth Daily As Needed., Disp: , Rfl:     PROGESTERONE PO, Take 300 mg by mouth Daily., Disp: , Rfl:     Testosterone Cypionate (DEPOTESTOTERONE CYPIONATE) 200 MG/ML injection, Inject 1 mL into the appropriate muscle as directed by prescriber 1 (One) Time Per Week. Pt unsure of dosage once per week, Disp: , Rfl:     Thiamine HCl (vitamin B-1) 50 MG tablet, Take 1 tablet by mouth Daily., Disp: , Rfl:     Thyroid (ARMOUR THYROID) 60 MG PO tablet, Take 1 tablet by mouth Daily., Disp: 30 tablet, Rfl: 2    vitamin D3 125 MCG (5000 UT) capsule capsule, Take 2 capsules every day by oral route at dinner for 90 days., Disp: , Rfl:     chlorpheniramine (CHLOR-TRIMETON) 4 MG tablet, Take 1 tablet by mouth Every 6 (Six) Hours As Needed for Allergies. (Patient not taking: Reported on 2/26/2024), Disp: , Rfl:     oxyCODONE-acetaminophen (PERCOCET) 5-325 MG per tablet, Take 1 tablet by mouth Every 4 (Four) Hours As Needed (Pain). (Patient not taking: Reported on 2/26/2024), Disp: 30 tablet, Rfl: 0   SOCIAL Hx:  [unfilled]    The following portions of the patient's history were reviewed and updated as appropriate: allergies, current medications, past family history, past medical history, past social history, past surgical history and problem list.    Review of Systems      Urinary incontinence assessment discussed      Except as outlined in history of physical illness, patient denies any changes in her GYN, , GI systems.  All other systems reviewed are negative         Objective   Physical Exam    /68   Ht 170.2 cm (67\")   Wt 78 kg (172 lb)   LMP  (LMP Unknown)   BMI 26.94 kg/m²     General: Patient is alert and oriented and appears overall healthy she is wearing " a right arm brace for her recent rotator cuff surgery  Neck: Is supple without thyromegaly, no carotid bruits and no lymphadenopathy  Lungs: Clear bilaterally, no wheezing, rhonchi, or rales.  Respiratory rate is normal  Breast: Even symmetrical, no lymphadenopathy, no retraction, no discharge ,no masses or lumps appreciated on either side  Heart: Regular rate and rhythm are appreciated, no murmurs or rubs are heard  Abdomen: Is soft, without organomegaly, bowel sounds are positive, there is no rebound or guarding and palpation does not produce any discomfort  Back: Nontender without CVA tenderness  Pelvic: External genitalia appear normal and consistent with mature female.  BUS normal                Urethra appears normal and without mass, bladder is nontender and without any lesions                        Urethral meatus is normal without scarring tenderness or masses                 Bladder is without tenderness or fullness                           Vagina is clean dry without discharge and , no lesions or masses are present                         Cervix is noninflamed without discharge or lesions.  There is no cervical motion tenderness.                Uterus is nonenlarged, without tenderness, and no masses or abnormalities are  present               Adnexa are non-enlarged, non tender               Rectal digital  exam reveals adequate sphincter tone and no masses or lesions are appreciated on digital rectal examination.       Patient Active Problem List   Diagnosis    Keratitis sicca, both eyes    Rosacea blepharoconjunctivitis    Chronic fatigue    Alopecia areata    GERD (gastroesophageal reflux disease)    Spina bifida occulta    Mixed hyperlipidemia    HPV in female    Calculus of gallbladder without cholecystitis    Calculus of kidney    Dysphagia    Endocervical polyp                Assessment & Plan   Diagnoses and all orders for this visit:    1. Screening for blood or protein in urine (Primary)  -      POC Urinalysis Dipstick, Multipro    2. Encounter for annual physical exam  -     Mammo Screening Digital Tomosynthesis Bilateral With CAD; Future  -     Ambulatory Referral to Gastroenterology  -     CT cardiac calcium score wo dye; Future    3. Breast cancer screening by mammogram    4. Screening for cervical cancer    5. Urinary tract infection with hematuria, site unspecified    Resolved with negative UA today    Discussed today's findings and concerns with patient.  Continue to recommend regular exercise including cardiovascular and resistance training as well as  breast self-exam. Wellness lab, mammography, & pap smear, in accordance with age guidelines.    I have encouraged her to call for today's test results if she has not received them within 10 days.  Patient is advised to call with any change in her condition or with any other questions, otherwise return  for annual examination.

## 2024-03-01 LAB
CYTOLOGIST CVX/VAG CYTO: NORMAL
CYTOLOGY CVX/VAG DOC CYTO: NORMAL
CYTOLOGY CVX/VAG DOC THIN PREP: NORMAL
DX ICD CODE: NORMAL
HPV I/H RISK 4 DNA CVX QL PROBE+SIG AMP: NEGATIVE
Lab: NORMAL
OTHER STN SPEC: NORMAL
STAT OF ADQ CVX/VAG CYTO-IMP: NORMAL

## 2024-03-06 ENCOUNTER — TREATMENT (OUTPATIENT)
Dept: PHYSICAL THERAPY | Facility: CLINIC | Age: 59
End: 2024-03-06
Payer: COMMERCIAL

## 2024-03-06 DIAGNOSIS — Z78.9 IMPAIRED MOBILITY AND ADLS: ICD-10-CM

## 2024-03-06 DIAGNOSIS — Z98.890 S/P RIGHT ROTATOR CUFF REPAIR: Primary | ICD-10-CM

## 2024-03-06 DIAGNOSIS — Z74.09 IMPAIRED MOBILITY AND ADLS: ICD-10-CM

## 2024-03-06 DIAGNOSIS — R29.898 SHOULDER WEAKNESS: ICD-10-CM

## 2024-03-06 NOTE — PROGRESS NOTES
Physical Therapy Daily Treatment Note    [facility]  [address] [place] [address]  [phone] (phone)  [phone] (fax)    Patient: Echo Montelongo   : 1965  Diagnosis/ICD-10 Code:  S/P right rotator cuff repair [Z98.890]  Referring practitioner: Suresh Monzon MD  Today's Date: 3/6/2024  Patient seen for 4 sessions    Visit Diagnoses:    ICD-10-CM ICD-9-CM   1. S/P right rotator cuff repair  Z98.890 V45.89   2. Shoulder weakness  R29.898 719.61   3. Impaired mobility and ADLs  Z74.09 V49.89    Z78.9               Subjective Doing better with healing.  Still have some crunching at the clavicle and some biceps discomfort.     Objective          Observations     Additional Shoulder Observation Details  Mild adhesion of anterior scar     Active Range of Motion     Right Shoulder   Flexion: 143 (137 on wall) degrees   External rotation 45°: 47 degrees       See Exercise, Manual, and Modality Logs for complete treatment.   Reviewed right shoulder self passive ROM using opposite hand to assist shoulder into flexion (feels better to add some distraction in scapular plane or she gets some crepitus near clavicle) and ER at 45 degrees abduction. She preforms ROM supine, at wall and at pulleys.  Scapular activation with straight arm pulses.      Assessment/Plan    R shoulder PROM meets goals for initial phase of protocol.  Performing all appropriate home exercises for initial phase as well.       Timed:    Manual Therapy:    8     mins  11502;  Therapeutic Exercise:    20     mins  02302;     Neuromuscular Raul:    10    mins  15131;    Therapeutic Activity:     0     mins  91519;     Gait Trainin     mins  63978;     Ultrasound:     0     mins  71683;    Aquatic Therapy    0     mins 81235;  Self Care                       0     mins   14125        Untimed:  Electrical Stimulation:    0     mins  25084 (MC );  Traction:    0     mins  28123;   Dry  Needling  (1-2 muscles)            0     mins 20560 (Self-pay)  Dry Needling (3-4 muscles) 0     20561 (Self-pay)  Dry Needling Trial    0     DRYNDLTRIAL  (No Charge)    Timed Treatment:   38   mins   Total Treatment:     38   mins    Danika Ornelas, PT  Physical Therapist    [place] License:[id number]

## 2024-03-08 RX ORDER — PANTOPRAZOLE SODIUM 40 MG/1
40 TABLET, DELAYED RELEASE ORAL DAILY PRN
OUTPATIENT
Start: 2024-03-08

## 2024-03-20 ENCOUNTER — TREATMENT (OUTPATIENT)
Dept: PHYSICAL THERAPY | Facility: CLINIC | Age: 59
End: 2024-03-20
Payer: COMMERCIAL

## 2024-03-20 DIAGNOSIS — Z78.9 IMPAIRED MOBILITY AND ADLS: ICD-10-CM

## 2024-03-20 DIAGNOSIS — R29.898 SHOULDER WEAKNESS: ICD-10-CM

## 2024-03-20 DIAGNOSIS — Z98.890 S/P RIGHT ROTATOR CUFF REPAIR: Primary | ICD-10-CM

## 2024-03-20 DIAGNOSIS — Z74.09 IMPAIRED MOBILITY AND ADLS: ICD-10-CM

## 2024-03-20 NOTE — PROGRESS NOTES
30-Day / 10-Visit Progress Note       [facility]  [address] [place] [address]  [phone] (phone)  [phone] (fax)    Patient: Echo Montelongo   : 1965  Diagnosis/ICD-10 Code:  S/P right rotator cuff repair [Z98.890]  Referring practitioner: Suresh Monzon MD  Date of Initial Visit: Type: THERAPY  Noted: 2024  Today's Date: 3/20/2024  Patient seen for 5 sessions      ICD-10-CM ICD-9-CM   1. S/P right rotator cuff repair  Z98.890 V45.89   2. Shoulder weakness  R29.898 719.61   3. Impaired mobility and ADLs  Z74.09 V49.89    Z78.9          Subjective:     Clinical Progress: improved  Home Program Compliance: Yes  Treatment has included:  therapeutic exercise, manual therapy, neuro-muscular retraining , and patient education with home exercise program     Subjective Have been out of the sling for one week and have been working on home exercises.   Objective          Palpation     Right   Hypertonic in the infraspinatus, latissimus, levator scapulae and upper trapezius. Tenderness of the biceps, infraspinatus, latissimus, levator scapulae and upper trapezius.     Active Range of Motion     Right Shoulder   Flexion: 90 degrees   External rotation 0°: 45 degrees     Passive Range of Motion     Right Shoulder   Flexion: 150 (Posterior shoulder pain at end ROM) degrees   External rotation 45°: 60 (Posterior shoulder pain) degrees   Internal rotation 45°: 60 (Light stretch feels good) degrees     Scapular Mobility     Right Shoulder   Scapular mobility: WFL    Strength/Myotome Testing     Right Shoulder     Planes of Motion   Flexion: 3   External rotation at 0°: 3   Internal rotation at 0°: 3         See Exercise, Manual, and Modality Logs for complete treatment.   Issued yellow and red bands     Assessment & Plan       Assessment  Impairments: abnormal muscle firing, abnormal muscle tone, abnormal or restricted ROM, activity intolerance, impaired physical  strength, lacks appropriate home exercise program, pain with function and weight-bearing intolerance   Functional limitations: carrying objects, lifting, sleeping, pulling, pushing, uncomfortable because of pain, reaching behind back, reaching overhead and unable to perform repetitive tasks   Assessment details: Echo Montelongo is a 58 y.o. year-old right-hand dominant female referred to physical therapy for right rotator cuff repair with biceps tenodesis performed on 1/25/24. She has been seen for 4 sessions this month.  She weaned from her sling one week ago and that has brought along a little more pain particularly sleeping.  The greatest ache is along the biceps.  She demonstrates improving shoulder PROM with some expected stiffness accompanied by some pain and developing AROM and strength after initiating active exercises just in the past week.   Prognosis: good    Goals  Plan Goals: PT Goal Recert Due Date: 05/05/24    STG Date to Achieve: 03/19/24    STG 1.  Patient will demonstrate an independent initial HEP for right shoulder assisted ROM and isometric strengthening that minimizes shoulder pain and increased ROM and isometric strength to good   Met    STG 2  Patient will wean from sling at 8 weeks and initiate using her right arm for self-care activities performed below shoulder level to include eating, driving, and bathing  Met  STG 3.Patient will demonstrate 80% of right shoulder PROM that prevents shoulder adhesive capsulitis and increased pain   Met  LTG Date to Achieve: 05/05/24 (Ongoing)    LTG 1. Patient will increase her right shoulder AROM to within 10 degrees of her left shoulder with good shoulder mechanics that allows her to reach to top cabinet shelf     LTG  2. Patient will increase her right shoulder strength to at least 4/5 that allows her to lift and carry 10 pounds and reach 3 pounds overhead to perform non-strenuous household activities without assistance     LTG 3: Patient will report  decreased functional disability on SPADI score from 90 % to 10 % or less       Plan  Therapy options: will be seen for skilled therapy services  Planned modality interventions: TENS, cryotherapy and electrical stimulation/Russian stimulation  Planned therapy interventions: home exercise program, therapeutic activities, stretching, strengthening, neuromuscular re-education, manual therapy and functional ROM exercises  Frequency: 2x week  Duration in weeks: 8  Treatment plan discussed with: patient           Recommendations: Continue as planned  Timeframe: 2 months  Prognosis to achieve goals: good    PT Signature: TIGIST Andino License Number: [id number]    Electronically signed by Danika Ornelas PT, 24, 12:01 PM EDT      Based upon review of the patient's progress and continued therapy plan, it is my medical opinion that Echo Montelongo should continue physical therapy treatment at [facility]  [address] [place] [address] [address]   [place] [id number]  [phone].    Signature: __________________________________  Suresh Monzon MD    Timed:  Manual Therapy:    20     mins  34751;  Therapeutic Exercise:    10     mins  92681;     Neuromuscular Raul:    10    mins  04547;    Therapeutic Activity:     0     mins  82582;     Gait Trainin     mins  34930;     Ultrasound:     0     mins  15183;      Untimed:  Electrical Stimulation:    0     mins  82744 ( );  Traction:    0     mins  78508;   Dry Needling  (1-2 muscles)            0     mins  (Self-pay)  Dry Needling (3-4 muscles) 0     mins  (Self-pay)  Dry Needling Trial    0     mins DRYNDLTRIAL  (No Charge)      Timed Treatment:   40   mins   Total Treatment:     40   mins

## 2024-03-26 RX ORDER — PANTOPRAZOLE SODIUM 40 MG/1
40 TABLET, DELAYED RELEASE ORAL DAILY
Qty: 90 TABLET | Refills: 2 | Status: SHIPPED | OUTPATIENT
Start: 2024-03-26

## 2024-04-01 DIAGNOSIS — Z12.31 VISIT FOR SCREENING MAMMOGRAM: Primary | ICD-10-CM

## 2024-04-02 ENCOUNTER — HOSPITAL ENCOUNTER (OUTPATIENT)
Facility: HOSPITAL | Age: 59
Discharge: HOME | End: 2024-04-02
Admitting: OBSTETRICS & GYNECOLOGY
Payer: COMMERCIAL

## 2024-04-02 DIAGNOSIS — Z12.31 VISIT FOR SCREENING MAMMOGRAM: ICD-10-CM

## 2024-04-02 PROCEDURE — 77067 SCR MAMMO BI INCL CAD: CPT

## 2024-04-02 PROCEDURE — 77063 BREAST TOMOSYNTHESIS BI: CPT

## 2024-04-05 ENCOUNTER — OFFICE VISIT (OUTPATIENT)
Dept: INTERNAL MEDICINE | Facility: CLINIC | Age: 59
End: 2024-04-05
Payer: COMMERCIAL

## 2024-04-05 VITALS
RESPIRATION RATE: 14 BRPM | DIASTOLIC BLOOD PRESSURE: 80 MMHG | TEMPERATURE: 97 F | WEIGHT: 172 LBS | OXYGEN SATURATION: 98 % | BODY MASS INDEX: 27 KG/M2 | SYSTOLIC BLOOD PRESSURE: 132 MMHG | HEART RATE: 89 BPM | HEIGHT: 67 IN

## 2024-04-05 DIAGNOSIS — R73.03 PRE-DIABETES: ICD-10-CM

## 2024-04-05 DIAGNOSIS — Z12.11 SCREENING FOR COLON CANCER: ICD-10-CM

## 2024-04-05 DIAGNOSIS — E78.2 MIXED HYPERLIPIDEMIA: ICD-10-CM

## 2024-04-05 DIAGNOSIS — E78.2 MIXED HYPERLIPIDEMIA: Primary | ICD-10-CM

## 2024-04-05 RX ORDER — EZETIMIBE 10 MG/1
10 TABLET ORAL DAILY
Qty: 30 TABLET | Refills: 0 | Status: SHIPPED | OUTPATIENT
Start: 2024-04-05 | End: 2024-04-08

## 2024-04-05 NOTE — PROGRESS NOTES
"Chief Complaint  Establish Care, Heartburn, and Hyperlipidemia    Subjective        Echo Montelongo presents to [facility]  History of Present Illness    Ms. Montelongo presents to clinic today as a new patient to establish care     She has no specific acute complaints or concerns today other than her cholesterol numbers. She was previously seeing [facility] medicine and follows with outside provider for compounding hormones. She has her cholesterol checked periodically and reports she has tried multiple statins but did not tolerate this due to myalgias. Her most recent numbers done a few weeks ago were listed below    Cholesterol 242  Triglycerides 104  HDL 57  VLDL 18    ApB 115        Review of Systems   Respiratory:  Negative for chest tightness and shortness of breath.    Cardiovascular:  Negative for chest pain.        Objective   Vital Signs:  /80   Pulse 89   Temp 97 °F (36.1 °C) (Temporal)   Resp 14   Ht 170.2 cm (67\")   Wt 78 kg (172 lb)   SpO2 98%   BMI 26.94 kg/m²   Estimated body mass index is 26.94 kg/m² as calculated from the following:    Height as of this encounter: 170.2 cm (67\").    Weight as of this encounter: 78 kg (172 lb).               Physical Exam  Vitals reviewed.   Constitutional:       General: She is not in acute distress.     Appearance: Normal appearance. She is not toxic-appearing.   HENT:      Head: Normocephalic and atraumatic.   Eyes:      General: No scleral icterus.     Conjunctiva/sclera: Conjunctivae normal.   Skin:     General: Skin is warm and dry.   Neurological:      Mental Status: She is alert and oriented to person, place, and time.   Psychiatric:         Mood and Affect: Mood normal.         Behavior: Behavior normal.        Result Review :                   Assessment and Plan   Diagnoses and all orders for this visit:    1. Mixed hyperlipidemia (Primary)  -     Lipid Panel; Future  -     ezetimibe (ZETIA) 10 MG tablet; Take " 1 tablet by mouth Daily.  Dispense: 30 tablet; Refill: 0    2. Pre-diabetes  -     Hemoglobin A1c; Future    3. Screening for colon cancer  -     Ambulatory Referral For Screening Colonoscopy      Her hyperlipidemia is uncontrolled, and is statin-intolerant. We will plan to trial zetia and recheck her cholesterol about 6 weeks after initiation. Additionally, she has upcoming coronary CT scan and asked her to provide the results at her upcoming visit.     Additionally, we will refer for screening colonoscopy.     We will see her back in about two weeks or sooner prn          Follow Up   Return in about 2 months (around 6/5/2024).  Patient was given instructions and counseling regarding her condition or for health maintenance advice. Please see specific information pulled into the AVS if appropriate.

## 2024-04-08 ENCOUNTER — TRANSCRIBE ORDERS (OUTPATIENT)
Dept: ADMINISTRATIVE | Facility: HOSPITAL | Age: 59
End: 2024-04-08
Payer: COMMERCIAL

## 2024-04-08 DIAGNOSIS — Z13.6 ENCOUNTER FOR SCREENING FOR VASCULAR DISEASE: Primary | ICD-10-CM

## 2024-04-08 RX ORDER — EZETIMIBE 10 MG/1
10 TABLET ORAL DAILY
Qty: 90 TABLET | Refills: 0 | Status: SHIPPED | OUTPATIENT
Start: 2024-04-08

## 2024-04-12 ENCOUNTER — HOSPITAL ENCOUNTER (OUTPATIENT)
Facility: HOSPITAL | Age: 59
Discharge: HOME | End: 2024-04-12
Admitting: OBSTETRICS & GYNECOLOGY

## 2024-04-12 DIAGNOSIS — Z00.00 ENCOUNTER FOR ANNUAL PHYSICAL EXAM: ICD-10-CM

## 2024-04-12 PROCEDURE — 75571 CT HRT W/O DYE W/CA TEST: CPT

## 2024-04-22 ENCOUNTER — TELEPHONE (OUTPATIENT)
Dept: OBSTETRICS AND GYNECOLOGY | Age: 59
End: 2024-04-22
Payer: COMMERCIAL

## 2024-04-22 NOTE — TELEPHONE ENCOUNTER
----- Message from Pino Ge MD sent at 4/22/2024  9:13 AM EDT -----  Please make sure patient is aware of her negative results  ----- Message -----  From: [facility] [place] [facility]  Sent: 4/18/2024   4:59 PM EDT  To: Pino Ge MD

## 2024-04-29 ENCOUNTER — OFFICE VISIT (OUTPATIENT)
Dept: OBSTETRICS AND GYNECOLOGY | Age: 59
End: 2024-04-29
Payer: COMMERCIAL

## 2024-04-29 VITALS
DIASTOLIC BLOOD PRESSURE: 74 MMHG | WEIGHT: 177 LBS | SYSTOLIC BLOOD PRESSURE: 112 MMHG | HEIGHT: 67 IN | BODY MASS INDEX: 27.78 KG/M2

## 2024-04-29 DIAGNOSIS — N95.8 GENITOURINARY SYNDROME OF MENOPAUSE: ICD-10-CM

## 2024-04-29 DIAGNOSIS — R39.9 UTI SYMPTOMS: Primary | ICD-10-CM

## 2024-04-29 LAB
BILIRUB BLD-MCNC: NEGATIVE MG/DL
CLARITY, POC: ABNORMAL
COLOR UR: YELLOW
GLUCOSE UR STRIP-MCNC: NEGATIVE MG/DL
KETONES UR QL: NEGATIVE
LEUKOCYTE EST, POC: NEGATIVE
NITRITE UR-MCNC: POSITIVE MG/ML
PH UR: 5.5 [PH] (ref 5–8)
PROT UR STRIP-MCNC: NEGATIVE MG/DL
RBC # UR STRIP: NEGATIVE /UL
SP GR UR: 1.02 (ref 1–1.03)
UROBILINOGEN UR QL: NORMAL

## 2024-04-29 PROCEDURE — 81003 URINALYSIS AUTO W/O SCOPE: CPT | Performed by: PHYSICIAN ASSISTANT

## 2024-04-29 PROCEDURE — 99213 OFFICE O/P EST LOW 20 MIN: CPT | Performed by: PHYSICIAN ASSISTANT

## 2024-04-29 RX ORDER — FLUCONAZOLE 150 MG/1
TABLET ORAL
Qty: 1 TABLET | Refills: 0 | Status: SHIPPED | OUTPATIENT
Start: 2024-04-29 | End: 2024-05-06 | Stop reason: SDUPTHER

## 2024-04-29 RX ORDER — SULFAMETHOXAZOLE AND TRIMETHOPRIM 800; 160 MG/1; MG/1
1 TABLET ORAL 2 TIMES DAILY
Qty: 14 TABLET | Refills: 0 | Status: SHIPPED | OUTPATIENT
Start: 2024-04-29 | End: 2024-05-06 | Stop reason: SDUPTHER

## 2024-04-29 RX ORDER — NITROFURANTOIN 25; 75 MG/1; MG/1
100 CAPSULE ORAL 2 TIMES DAILY
Qty: 14 CAPSULE | Refills: 0 | Status: SHIPPED | OUTPATIENT
Start: 2024-04-29 | End: 2024-04-29

## 2024-04-29 NOTE — PROGRESS NOTES
"Subjective     Chief Complaint   Patient presents with    Gynecologic Exam     C/o recurrent uti and yeast, has not felt well since last time she was seen in feb. Took antibiotic we gave her finished that and took an antibiotic Dr Ge gave to take periodically and took two diflucan.       Echo Montelongo is a 58 y.o.  whose LMP is No LMP recorded (lmp unknown). Patient is postmenopausal. presents with recurrent uti    Had a breakout/rash that started in January  Was given spironolactone and that helped a little    Did have a uti that was treated in Feb  Felt like her sxs improved but then they come back within a day  Thought it could be yeast so she took diflucan which did not help    She is not a good water drinker  Does not drink a lot of it  Drinks coke zero     Same partner for 2 years so no risk of std    No Additional Complaints Reported    The following portions of the patient's history were reviewed and updated as appropriate:no additional history reviewed, vital signs, allergies, current medications, past medical history, past social history, past surgical history, and problem list      Review of Systems   Genitourinary:positive for uti     Objective      /74   Ht 170.2 cm (67\")   Wt 80.3 kg (177 lb)   LMP  (LMP Unknown)   BMI 27.72 kg/m²     Physical Exam    General:   alert, comfortable, and no distress   Heart: Not performed today   Lungs: Not performed today.   Breast: Not performed today   Neck: Not performed today   Abdomen: Not performed today   CVA: Not performed today   Pelvis: External genitalia: normal general appearance  Vaginal: normal mucosa without prolapse or lesions  Cervix: normal appearance   Extremities: Not performed today   Neurologic: negative   Psychiatric: Normal affect, judgement, and mood       Lab Review   Labs: Urinalysis - with micro    Imaging   No data reviewed    Assessment & Plan     ASSESSMENT  1. UTI symptoms    2. Genitourinary syndrome of menopause  "         PLAN  1.   Orders Placed This Encounter   Procedures    Urine Culture - Urine, Urine, Random Void    POC Urinalysis Dipstick, Multipro       2. Medications prescribed this encounter:        New Medications Ordered This Visit   Medications    sulfamethoxazole-trimethoprim (Bactrim DS) 800-160 MG per tablet     Sig: Take 1 tablet by mouth 2 (Two) Times a Day for 7 days.     Dispense:  14 tablet     Refill:  0    fluconazole (Diflucan) 150 MG tablet     Sig: Use 1 poi x 1 and then rpt 3 days later prn     Dispense:  1 tablet     Refill:  0       3. I will send in bactrim to treat her infection. She also requested macrobid refills to use prn. I would strongly encourage pushing fluids/water and getting at least 80 ozs of water in a day. Could also consider trying d mannose.     Follow up: prn     MAURICE Dinh  4/29/2024

## 2024-05-02 LAB
BACTERIA UR CULT: ABNORMAL
BACTERIA UR CULT: ABNORMAL
OTHER ANTIBIOTIC SUSC ISLT: ABNORMAL

## 2024-05-06 RX ORDER — SULFAMETHOXAZOLE AND TRIMETHOPRIM 800; 160 MG/1; MG/1
1 TABLET ORAL 2 TIMES DAILY
Qty: 10 TABLET | Refills: 0 | Status: SHIPPED | OUTPATIENT
Start: 2024-05-06 | End: 2024-05-11

## 2024-05-06 RX ORDER — FLUCONAZOLE 150 MG/1
TABLET ORAL
Qty: 3 TABLET | Refills: 0 | Status: SHIPPED | OUTPATIENT
Start: 2024-05-06

## 2024-05-20 ENCOUNTER — TELEPHONE (OUTPATIENT)
Dept: OBSTETRICS AND GYNECOLOGY | Age: 59
End: 2024-05-20
Payer: COMMERCIAL

## 2024-05-20 RX ORDER — AMOXICILLIN AND CLAVULANATE POTASSIUM 875; 125 MG/1; MG/1
1 TABLET, FILM COATED ORAL 2 TIMES DAILY
Qty: 20 TABLET | Refills: 0 | Status: SHIPPED | OUTPATIENT
Start: 2024-05-20 | End: 2024-05-30

## 2024-05-22 RX ORDER — FLUCONAZOLE 150 MG/1
TABLET ORAL
Qty: 3 TABLET | Refills: 0 | Status: SHIPPED | OUTPATIENT
Start: 2024-05-22

## 2024-05-22 NOTE — TELEPHONE ENCOUNTER
PT said she is going to get meds and has not been able to respond due to her wokr schedule. PT said she has seen a urologist Dr Cummings. PT asked if she should she go back there or be referred somewhere else? PT said she needs diflucan again as well when she takes the Augmentin. PT said she gets a yeast infection each time she takes a antibiotic to cure the UTI?

## 2024-06-01 DIAGNOSIS — E78.2 MIXED HYPERLIPIDEMIA: ICD-10-CM

## 2024-06-01 RX ORDER — PANTOPRAZOLE SODIUM 40 MG/1
40 TABLET, DELAYED RELEASE ORAL DAILY
Qty: 90 TABLET | Refills: 2 | Status: CANCELLED | OUTPATIENT
Start: 2024-06-01

## 2024-06-03 ENCOUNTER — OFFICE VISIT (OUTPATIENT)
Dept: INTERNAL MEDICINE | Facility: CLINIC | Age: 59
End: 2024-06-03
Payer: COMMERCIAL

## 2024-06-03 VITALS
OXYGEN SATURATION: 98 % | HEART RATE: 93 BPM | TEMPERATURE: 94.5 F | BODY MASS INDEX: 27.18 KG/M2 | HEIGHT: 67 IN | DIASTOLIC BLOOD PRESSURE: 82 MMHG | SYSTOLIC BLOOD PRESSURE: 122 MMHG | WEIGHT: 173.2 LBS

## 2024-06-03 DIAGNOSIS — E78.2 MIXED HYPERLIPIDEMIA: Primary | ICD-10-CM

## 2024-06-03 DIAGNOSIS — R73.03 PRE-DIABETES: ICD-10-CM

## 2024-06-03 DIAGNOSIS — E61.1 IRON DEFICIENCY: ICD-10-CM

## 2024-06-03 PROCEDURE — 99213 OFFICE O/P EST LOW 20 MIN: CPT | Performed by: STUDENT IN AN ORGANIZED HEALTH CARE EDUCATION/TRAINING PROGRAM

## 2024-06-03 RX ORDER — EZETIMIBE 10 MG/1
10 TABLET ORAL DAILY
Qty: 90 TABLET | Refills: 0 | Status: CANCELLED | OUTPATIENT
Start: 2024-06-03

## 2024-06-03 NOTE — TELEPHONE ENCOUNTER
Can you call and clarify to get this set up through mail order so I can deliver these through mail order?

## 2024-06-03 NOTE — PROGRESS NOTES
"Chief Complaint  Hyperlipidemia    Subjective        Echo Montelongo presents to [facility]  History of Present Illness    Ms. Montelongo presents to clinic today for follow-up of hyperlipidemia    Since last visit she has started taking the Zetia and has had no significant adverse side effects.  She was unable to obtain repeat cholesterol labs prior to this appointment but plans to get it after this visit.    She does report a history of low iron and is interested in obtaining repeat iron studies      Review of Systems   Constitutional:  Negative for fever.   Respiratory:  Negative for shortness of breath.    Gastrointestinal:  Negative for abdominal pain.   Musculoskeletal:  Negative for arthralgias and myalgias.        Objective   Vital Signs:  /82   Pulse 93   Temp 94.5 °F (34.7 °C) (Temporal)   Ht 170.2 cm (67\")   Wt 78.6 kg (173 lb 3.2 oz)   SpO2 98%   BMI 27.13 kg/m²   Estimated body mass index is 27.13 kg/m² as calculated from the following:    Height as of this encounter: 170.2 cm (67\").    Weight as of this encounter: 78.6 kg (173 lb 3.2 oz).               Physical Exam  Vitals reviewed.   Constitutional:       General: She is not in acute distress.     Appearance: Normal appearance. She is not toxic-appearing.   HENT:      Head: Normocephalic and atraumatic.   Eyes:      General: No scleral icterus.     Conjunctiva/sclera: Conjunctivae normal.   Skin:     General: Skin is warm and dry.   Neurological:      Mental Status: She is alert and oriented to person, place, and time.   Psychiatric:         Mood and Affect: Mood normal.         Behavior: Behavior normal.        Result Review :                   Assessment and Plan   Diagnoses and all orders for this visit:    1. Mixed hyperlipidemia (Primary)    2. Iron deficiency  -     CBC Auto Differential  -     Iron Profile  -     Ferritin    3. Pre-diabetes      She is tolerating Zetia well so we will continue current dose " pending repeat cholesterol levels today.  Of note she does report she had a CAC score of 0 since last visit    Will evaluate iron studies today given hx of iron deficiency          Follow Up   Return in about 6 months (around 12/3/2024).  Patient was given instructions and counseling regarding her condition or for health maintenance advice. Please see specific information pulled into the AVS if appropriate.

## 2024-06-04 DIAGNOSIS — E78.2 MIXED HYPERLIPIDEMIA: ICD-10-CM

## 2024-06-04 DIAGNOSIS — K21.9 GASTROESOPHAGEAL REFLUX DISEASE, UNSPECIFIED WHETHER ESOPHAGITIS PRESENT: Primary | ICD-10-CM

## 2024-06-04 LAB
BASOPHILS # BLD AUTO: 0.05 10*3/MM3 (ref 0–0.2)
BASOPHILS NFR BLD AUTO: 0.8 % (ref 0–1.5)
CHOLEST SERPL-MCNC: 184 MG/DL (ref 0–200)
EOSINOPHIL # BLD AUTO: 0.14 10*3/MM3 (ref 0–0.4)
EOSINOPHIL NFR BLD AUTO: 2.3 % (ref 0.3–6.2)
ERYTHROCYTE [DISTWIDTH] IN BLOOD BY AUTOMATED COUNT: 11.8 % (ref 12.3–15.4)
FERRITIN SERPL-MCNC: 48.8 NG/ML (ref 13–150)
HBA1C MFR BLD: 5.1 % (ref 4.8–5.6)
HCT VFR BLD AUTO: 46 % (ref 34–46.6)
HDLC SERPL-MCNC: 53 MG/DL (ref 40–60)
HGB BLD-MCNC: 15.5 G/DL (ref 12–15.9)
IMM GRANULOCYTES # BLD AUTO: 0.01 10*3/MM3 (ref 0–0.05)
IMM GRANULOCYTES NFR BLD AUTO: 0.2 % (ref 0–0.5)
IRON SATN MFR SERPL: 28 % (ref 20–50)
IRON SERPL-MCNC: 120 MCG/DL (ref 37–145)
LDLC SERPL CALC-MCNC: 118 MG/DL (ref 0–100)
LYMPHOCYTES # BLD AUTO: 1.42 10*3/MM3 (ref 0.7–3.1)
LYMPHOCYTES NFR BLD AUTO: 23.8 % (ref 19.6–45.3)
MCH RBC QN AUTO: 31.6 PG (ref 26.6–33)
MCHC RBC AUTO-ENTMCNC: 33.7 G/DL (ref 31.5–35.7)
MCV RBC AUTO: 93.7 FL (ref 79–97)
MONOCYTES # BLD AUTO: 0.35 10*3/MM3 (ref 0.1–0.9)
MONOCYTES NFR BLD AUTO: 5.9 % (ref 5–12)
NEUTROPHILS # BLD AUTO: 4 10*3/MM3 (ref 1.7–7)
NEUTROPHILS NFR BLD AUTO: 67 % (ref 42.7–76)
NRBC BLD AUTO-RTO: 0 /100 WBC (ref 0–0.2)
PLATELET # BLD AUTO: 293 10*3/MM3 (ref 140–450)
RBC # BLD AUTO: 4.91 10*6/MM3 (ref 3.77–5.28)
TIBC SERPL-MCNC: 432 MCG/DL
TRIGL SERPL-MCNC: 67 MG/DL (ref 0–150)
UIBC SERPL-MCNC: 312 MCG/DL (ref 112–346)
VLDLC SERPL CALC-MCNC: 13 MG/DL (ref 5–40)
WBC # BLD AUTO: 5.97 10*3/MM3 (ref 3.4–10.8)

## 2024-06-04 RX ORDER — EZETIMIBE 10 MG/1
10 TABLET ORAL DAILY
Qty: 90 TABLET | Refills: 0 | Status: SHIPPED | OUTPATIENT
Start: 2024-06-04

## 2024-06-04 RX ORDER — PANTOPRAZOLE SODIUM 40 MG/1
40 TABLET, DELAYED RELEASE ORAL DAILY
Qty: 90 TABLET | Refills: 2 | Status: SHIPPED | OUTPATIENT
Start: 2024-06-04

## 2024-06-17 ENCOUNTER — OFFICE VISIT (OUTPATIENT)
Dept: OBSTETRICS AND GYNECOLOGY | Age: 59
End: 2024-06-17
Payer: COMMERCIAL

## 2024-06-17 VITALS
SYSTOLIC BLOOD PRESSURE: 122 MMHG | WEIGHT: 173 LBS | HEIGHT: 67 IN | DIASTOLIC BLOOD PRESSURE: 70 MMHG | BODY MASS INDEX: 27.15 KG/M2

## 2024-06-17 DIAGNOSIS — N89.8 VAGINAL IRRITATION: Primary | ICD-10-CM

## 2024-06-17 DIAGNOSIS — R21 SKIN RASH: ICD-10-CM

## 2024-06-17 LAB
BILIRUB BLD-MCNC: NEGATIVE MG/DL
CLARITY, POC: CLEAR
COLOR UR: YELLOW
GLUCOSE UR STRIP-MCNC: NEGATIVE MG/DL
KETONES UR QL: NEGATIVE
LEUKOCYTE EST, POC: ABNORMAL
NITRITE UR-MCNC: NEGATIVE MG/ML
PH UR: 6 [PH] (ref 5–8)
PROT UR STRIP-MCNC: NEGATIVE MG/DL
RBC # UR STRIP: NEGATIVE /UL
SP GR UR: 1.01 (ref 1–1.03)
UROBILINOGEN UR QL: NORMAL

## 2024-06-17 PROCEDURE — 99213 OFFICE O/P EST LOW 20 MIN: CPT | Performed by: OBSTETRICS & GYNECOLOGY

## 2024-06-17 PROCEDURE — 81002 URINALYSIS NONAUTO W/O SCOPE: CPT | Performed by: OBSTETRICS & GYNECOLOGY

## 2024-06-17 RX ORDER — FLUCONAZOLE 150 MG/1
150 TABLET ORAL DAILY
Qty: 3 TABLET | Refills: 0 | Status: SHIPPED | OUTPATIENT
Start: 2024-06-17 | End: 2024-06-20

## 2024-06-17 NOTE — PROGRESS NOTES
Subjective       History of Present Illness  Echo Montelongo is a 58 y.o. female is being seen today for evaluation of possible yeast infection additionally she thinks she might have her recurrent yeast infection  Patient also complains of just not feeling great, she had a similar concern when she saw her PCP recently.  She states that ever since she had shoulder surgery things just have not been the same.  She is not having the classic dysuria or frequency symptoms that she sometimes has with urinary tract infections.  No fever or chills.  She also is not having any unusual discharge    She continues to take estrogen cream testosterone shot and progesterone pills    She has been on Zetia for a while but did not have any rash associated with the initiation of that medication  Chief Complaint   Patient presents with    Gynecologic Exam     Gyn problem: ? Yeast infection,red patches on face and legs   .        The following portions of the patient's history were reviewed and updated as appropriate: allergies, current medications, past family history, past medical history, past social history, past surgical history and problem list.    PAST MEDICAL HISTORY  Past Medical History:   Diagnosis Date    Abnormal Pap smear of cervix     LGSIL,22,negative hpv    Anemia     Disease of thyroid gland     GERD (gastroesophageal reflux disease)     High cholesterol     BORDERLINE NO MEDICINE    History of reduction of orbital fracture     RIGHT    Hypothyroidism     Injury of back     Insulin resistance     Knee sprain     PONV (postoperative nausea and vomiting)     SEVERE N/V    Right shoulder pain     Shoulder injury     Sinus congestion     Urinary incontinence      OB History    Para Term  AB Living   3 3 2 1   2   SAB IAB Ectopic Molar Multiple Live Births             2      # Outcome Date GA Lbr Hussain/2nd Weight Sex Type Anes PTL Lv   3   36w0d  3204 g (7 lb 1 oz) M    ANTHONY   2 Term    3771  g (8 lb 5 oz) F CS-LTranv   ANTHONY   1 Term              Past Surgical History:   Procedure Laterality Date    ABDOMINOPLASTY      ANTERIOR AND POSTERIOR VAGINAL REPAIR N/A 2017    Procedure: PERINEOPLASTY AND EXCISION OF PERINEAL LESION;  Surgeon: Pino Ge MD;  Location: [facility];  Service:      SECTION      COLONOSCOPY N/A 2013    Katie FERMIN    COLPOSCOPY W/ BIOPSY / CURETTAGE  2015    benign    ELBOW PROCEDURE      Forearm fracture    ENDOMETRIAL ABLATION      ENDOSCOPY  2007    ENDOSCOPY N/A 2023    Procedure: ESOPHAGOGASTRODUODENOSCOPY WITH BIOPSIES AND 15 MM, 16.5 MM, AND 18 MM BALLOON DILATATION.;  Surgeon: Frandy Cisneros MD;  Location: [facility] ENDOSCOPY;  Service: Gastroenterology;  Laterality: N/A;  pre: DYSPHAGIA, GERD  post: HIATAL HERNIA, DUODENITIS, BILE REFLUX, GASTRITIS, ESOPHAGITIS, MILD ESOPHAGEAL STENOSIS    EYE SURGERY      FRACTURE SURGERY      MICROAMBULATORY PHLEBECTOMY Left 2023    Procedure: LEFT LEG PHLEBECTOMY;  Surgeon: Dina Brown MD;  Location: [facility] MAIN OR;  Service: Vascular;  Laterality: Left;    ORBITAL FRACTURE OPEN REDUCTION INTERNAL FIXATION Right 10/18/2016    Procedure: REPAIR ORBITAL FLOOR FRACTURE;  Surgeon: Ernesto Cali MD;  Location: [facility] OR;  Service:     ORIF WRIST FRACTURE Right 10/18/2016    Procedure: RT ULNA/RADIUS OPEN REDUCTION INTERNAL FIXATION;  Surgeon: Karen Harris MD;  Location: [facility] OR;  Service:     PERINEOPLASTY      SHOULDER ARTHROSCOPY W/ ROTATOR CUFF REPAIR Right 2024    Procedure: RIGHT SHOULDER ARTHROSCOPY,  LABRAL DEBRIDEMENT, BICEPS TENODESIS,  DECOMPRESSION, AND ROTATOR CUFF REPAIR;  Surgeon: Suresh Monzon MD;  Location: [facility] OR [facility];  Service: Orthopedics;  Laterality: Right;    UPPER GASTROINTESTINAL ENDOSCOPY  2013    Katie FERMIN     Family History   Problem Relation Age of Onset    Alcohol abuse Mother     Cerebral aneurysm Father      Alcohol abuse Father     Alcohol abuse Maternal Grandmother     Alcohol abuse Paternal Grandmother     Alcohol abuse Paternal Grandfather     Brain cancer Maternal Uncle     Jose Hyperthermia Neg Hx     Breast cancer Neg Hx      Social History     Tobacco Use   Smoking Status Never    Passive exposure: Never   Smokeless Tobacco Never       Current Outpatient Medications:     B Complex Vitamins (VITAMIN B COMPLEX PO), Take  by mouth., Disp: , Rfl:     estradiol (ESTRACE) 0.1 MG/GM vaginal cream, Insert 2 g into the vagina Daily., Disp: , Rfl:     ezetimibe (ZETIA) 10 MG tablet, Take 1 tablet by mouth Daily., Disp: 90 tablet, Rfl: 0    Ferrous Sulfate (IRON PO), Take 1 tablet by mouth Daily., Disp: , Rfl:     metFORMIN ER (GLUCOPHAGE-XR) 500 MG 24 hr tablet, Take 1 tablet by mouth Every Night., Disp: 90 tablet, Rfl: 1    Multiple Vitamin (MULTI-VITAMIN DAILY PO), Take 1 tablet by mouth Daily As Needed., Disp: , Rfl:     pantoprazole (PROTONIX) 40 MG EC tablet, Take 1 tablet by mouth Daily., Disp: 90 tablet, Rfl: 2    PROGESTERONE PO, Take 300 mg by mouth Daily., Disp: , Rfl:     Testosterone Cypionate (DEPOTESTOTERONE CYPIONATE) 200 MG/ML injection, Inject 1 mL into the appropriate muscle as directed by prescriber 1 (One) Time Per Week. Pt unsure of dosage once per week, Disp: , Rfl:     Thiamine HCl (vitamin B-1) 50 MG tablet, Take 1 tablet by mouth Daily., Disp: , Rfl:     Thyroid (ARMOUR THYROID) 60 MG PO tablet, Take 1 tablet by mouth Daily., Disp: 30 tablet, Rfl: 2    vitamin D3 125 MCG (5000 UT) capsule capsule, Take 2 capsules every day by oral route at dinner for 90 days., Disp: , Rfl:   Immunization History   Administered Date(s) Administered    31-influenza Vac Quardvalent Preservativ 11/01/2021    COVID-19 (PFIZER) Purple Cap Monovalent 01/03/2021, 01/25/2021, 11/09/2021    COVID-19 F23 (PFIZER) 12YRS+ (COMIRNATY) 10/13/2023    Covid-19 (Pfizer) Gray Cap Monovalent 06/10/2022    FluMist 2-49yrs  09/20/2013, 11/01/2015    Flublok 18+yrs 10/13/2023    Fluzone (or Fluarix & Flulaval for VFC) >6mos 10/10/2019    Hepatitis A 06/11/2018, 01/30/2019    Shingrix 01/27/2023, 04/10/2023    Tdap 01/31/2014       Review of Systems       Except as outlined in history of physical illness, patient denies any changes in her GYN, , GI systems. All other systems reviewed are negative.    Objective   Physical Exam   Alert and oriented, respirations unlabored, heart regular rate and rhythm   Pelvic external genitalia female, perineum appears normal no vesicles no discharge no erythema.  She has excellent introital support no cystocele no rectocele              Vagina is clean dry intact, no unusual discharge no white changes              Cervix is clean dry intact without discharge or motion tenderness              Uterus nonenlarged nontender adnexa nonpalpable      Assessment & Plan   Diagnoses and all orders for this visit:    1. Vaginal irritation (Primary)  -     POC Urinalysis Dipstick  -     Urine Culture - Urine, Urine, Clean Catch    2. Skin rash      As outlined in HPI, patient just has not been feeling right since the shoulder arthroscopy with rotator cuff repair January of this year  Has had some atypical yeast infections in the past but there is no clinical indication that is going on now, and today's exam overall reassuring from a GYN point of view we are checking a vaginal swab for other pathogens as well as yeast  We will prophylactically send an order for Diflucan to the pharmacy but does not need to be filled unless the culture comes back positive  Today's urinalysis is negative for bilirubin ketones blood protein urobilinogen and nitrites-will send for culture to be sure  Discussed atypical presentation and her rash has been there on her lower extremities for about a week.Echo will likely check with her PCP if it does not resolve in the near future.              Orders Placed This Encounter   Procedures     Urine Culture - Urine, Urine, Clean Catch     Order Specific Question:   Release to patient     Answer:   Routine Release [[id number]]    POC Urinalysis Dipstick     Order Specific Question:   Release to patient     Answer:   Routine Release [[id number]]           EMR Dragon/ Transcription disclaimer:  Much of the encounter note is an electronic transcription/translation of spoken language to printed text. The electronic translation of spoken language may permit erroneous, or at times, nonessential words or phrases to be inadvertently transcribes; Although i have reviewed the note for such errors, some may still exist.

## 2024-06-19 ENCOUNTER — TELEPHONE (OUTPATIENT)
Dept: OBSTETRICS AND GYNECOLOGY | Age: 59
End: 2024-06-19
Payer: COMMERCIAL

## 2024-06-19 LAB
A VAGINAE DNA VAG QL NAA+PROBE: NORMAL SCORE
BACTERIA UR CULT: NORMAL
BACTERIA UR CULT: NORMAL
BVAB2 DNA VAG QL NAA+PROBE: NORMAL SCORE
C ALBICANS DNA VAG QL NAA+PROBE: NEGATIVE
C GLABRATA DNA VAG QL NAA+PROBE: NEGATIVE
C TRACH DNA VAG QL NAA+PROBE: NEGATIVE
MEGA1 DNA VAG QL NAA+PROBE: NORMAL SCORE
N GONORRHOEA DNA VAG QL NAA+PROBE: NEGATIVE
T VAGINALIS DNA VAG QL NAA+PROBE: NEGATIVE

## 2024-06-19 NOTE — PROGRESS NOTES
Please let patient know that test results did NOT  show any presence of yeast and no other abnormal growths  And fortunately urine culture is negative as well

## 2024-06-19 NOTE — TELEPHONE ENCOUNTER
----- Message from Pino Link sent at 6/19/2024  9:31 AM EDT -----  Please let patient know that test results did NOT  show any presence of yeast and no other abnormal growths  And fortunately urine culture is negative as well

## 2024-08-16 ENCOUNTER — HOSPITAL ENCOUNTER (OUTPATIENT)
Dept: CARDIOLOGY | Facility: HOSPITAL | Age: 59
Discharge: HOME | End: 2024-08-16

## 2024-08-16 VITALS
HEIGHT: 67 IN | SYSTOLIC BLOOD PRESSURE: 116 MMHG | DIASTOLIC BLOOD PRESSURE: 70 MMHG | WEIGHT: 174 LBS | BODY MASS INDEX: 27.31 KG/M2 | HEART RATE: 78 BPM

## 2024-08-16 DIAGNOSIS — Z13.6 ENCOUNTER FOR SCREENING FOR VASCULAR DISEASE: ICD-10-CM

## 2024-08-16 LAB
BH CV ECHO MEAS - DIST AO DIAM: 1.49 CM
BH CV VAS BP LEFT ARM: NORMAL MMHG
BH CV VAS BP RIGHT ARM: NORMAL MMHG
BH CV VAS SCREENING CAROTID CCA LEFT: NORMAL CM/SEC
BH CV VAS SCREENING CAROTID CCA RIGHT: NORMAL CM/SEC
BH CV VAS SCREENING CAROTID ICA LEFT: NORMAL CM/SEC
BH CV VAS SCREENING CAROTID ICA RIGHT: NORMAL CM/SEC
BH CV XLRA MEAS - MID AO DIAM: 1.61 CM
BH CV XLRA MEAS - PAD LEFT ABI DP: 1.25
BH CV XLRA MEAS - PAD LEFT ABI PT: 1.25
BH CV XLRA MEAS - PAD LEFT ARM: 115 MMHG
BH CV XLRA MEAS - PAD LEFT LEG DP: 146 MMHG
BH CV XLRA MEAS - PAD LEFT LEG PT: 145 MMHG
BH CV XLRA MEAS - PAD RIGHT ABI DP: 1.28
BH CV XLRA MEAS - PAD RIGHT ABI PT: 1.34
BH CV XLRA MEAS - PAD RIGHT ARM: 116 MMHG
BH CV XLRA MEAS - PAD RIGHT LEG DP: 149 MMHG
BH CV XLRA MEAS - PAD RIGHT LEG PT: 156 MMHG
BH CV XLRA MEAS - PROX AO DIAM: 1.81 CM
BH CV XLRA MEAS LEFT ICA/CCA RATIO: 0.9
BH CV XLRA MEAS LEFT PROX ECA PSV: NORMAL CM/SEC
BH CV XLRA MEAS RIGHT ICA/CCA RATIO: 0.96
BH CV XLRA MEAS RIGHT PROX ECA PSV: NORMAL CM/SEC
MAXIMAL PREDICTED HEART RATE: 163 BPM
STRESS TARGET HR: 139 BPM

## 2024-08-16 PROCEDURE — 93799 UNLISTED CV SVC/PROCEDURE: CPT

## 2024-08-26 DIAGNOSIS — E78.2 MIXED HYPERLIPIDEMIA: ICD-10-CM

## 2024-08-26 RX ORDER — EZETIMIBE 10 MG/1
10 TABLET ORAL DAILY
Qty: 90 TABLET | Refills: 0 | Status: SHIPPED | OUTPATIENT
Start: 2024-08-26

## 2024-09-23 DIAGNOSIS — E78.2 MIXED HYPERLIPIDEMIA: ICD-10-CM

## 2024-09-23 RX ORDER — EZETIMIBE 10 MG/1
10 TABLET ORAL DAILY
Qty: 90 TABLET | Refills: 0 | Status: SHIPPED | OUTPATIENT
Start: 2024-09-23

## 2024-09-30 ENCOUNTER — TELEPHONE (OUTPATIENT)
Dept: OBSTETRICS AND GYNECOLOGY | Age: 59
End: 2024-09-30
Payer: COMMERCIAL

## 2024-09-30 RX ORDER — NITROFURANTOIN 25; 75 MG/1; MG/1
100 CAPSULE ORAL ONCE AS NEEDED
Qty: 24 CAPSULE | Refills: 1 | Status: SHIPPED | OUTPATIENT
Start: 2024-09-30

## 2024-10-11 ENCOUNTER — TELEPHONE (OUTPATIENT)
Dept: OBSTETRICS AND GYNECOLOGY | Age: 59
End: 2024-10-11
Payer: COMMERCIAL

## 2024-10-11 RX ORDER — FLUCONAZOLE 150 MG/1
150 TABLET ORAL ONCE
Qty: 1 TABLET | Refills: 0 | Status: SHIPPED | OUTPATIENT
Start: 2024-10-11 | End: 2024-10-11

## 2024-10-11 NOTE — TELEPHONE ENCOUNTER
Pt of Dr. Ge called asking for diflucan to be sent in for her.  She states she takes preventative macrobid and usually gets a yeast infection.  Pharmacy verified.

## 2024-10-15 ENCOUNTER — TELEPHONE (OUTPATIENT)
Dept: OBSTETRICS AND GYNECOLOGY | Age: 59
End: 2024-10-15
Payer: COMMERCIAL

## 2024-10-15 NOTE — TELEPHONE ENCOUNTER
Pt states she called last week for Diflucan to be sent in, pt only got one and usually needs 3-4, asking for more to be sent in

## 2024-11-01 ENCOUNTER — TELEPHONE (OUTPATIENT)
Dept: OBSTETRICS AND GYNECOLOGY | Age: 59
End: 2024-11-01
Payer: COMMERCIAL

## 2024-11-01 NOTE — TELEPHONE ENCOUNTER
Pt aware you are out till Monday- requesting a maintenance dose of Diflucan   Subjective:    HPI  Oswestry Score: 32 %                 Objective:    System    Physical Exam    General     ROS    Assessment/Plan:      DISCHARGE REPORT    Progress reporting period is from 3-2-17 to 5-2-17.       SUBJECTIVE  Subjective changes noted by patient:  .  Subjective: Overall feel I am 40% better;     Current pain level is 3/10 Current Pain level: 3/10.     Previous pain level was  8/10 Initial Pain level: 8/10.   Changes in function:  Yes (See Goal flowsheet attached for changes in current functional level)  Adverse reaction to treatment or activity: None    OBJECTIVE  Changes noted in objective findings:  The objective findings below are from DOS 5-2-17.  Objective: VY=32, Andrey=Low; demonstrating good improvement for continued self management of symptoms.     ASSESSMENT/PLAN  Updated problem list and treatment plan: Diagnosis 1:  Chronic low back pain w/radiation  Pain -  self management, education, directional preference exercise and home program  Decreased ROM/flexibility - manual therapy and therapeutic exercise  Decreased function - therapeutic activities  STG/LTGs have been met or progress has been made towards goals:  Yes (See Goal flow sheet completed today.)  Assessment of Progress: The patient has met all of their long term goals.  Self Management Plans:  Patient is independent in a home treatment program.  Patient is independent in self management of symptoms.  I have re-evaluated this patient and find that the nature, scope, duration and intensity of the therapy is appropriate for the medical condition of the patient.  Saran continues to require the following intervention to meet STG and LTG's:  PT intervention is no longer required to meet STG/LTG.    Recommendations:  This patient is ready to be discharged from therapy and continue their home treatment program.    Please refer to the daily flowsheet for treatment today, total treatment time and time spent performing 1:1 timed  codes.

## 2024-11-04 RX ORDER — FLUCONAZOLE 150 MG/1
150 TABLET ORAL DAILY
Qty: 3 TABLET | Refills: 4 | Status: SHIPPED | OUTPATIENT
Start: 2024-11-04 | End: 2024-11-19

## 2024-11-05 ENCOUNTER — TELEPHONE (OUTPATIENT)
Dept: OBSTETRICS AND GYNECOLOGY | Age: 59
End: 2024-11-05
Payer: COMMERCIAL

## 2024-11-05 NOTE — TELEPHONE ENCOUNTER
PT went to go  diflucan from her pharmacy.. Pharmacy said that the prescription was written in a way they can not dispense. Can you please advise?

## 2024-11-05 NOTE — TELEPHONE ENCOUNTER
See message.The medication was prescribed to take for 15 days,and only 3 were sent in.Please re-send.

## 2024-11-06 ENCOUNTER — TELEPHONE (OUTPATIENT)
Dept: OBSTETRICS AND GYNECOLOGY | Age: 59
End: 2024-11-06
Payer: COMMERCIAL

## 2024-11-06 NOTE — TELEPHONE ENCOUNTER
Spoke with Cathy Pickett [facility]'s and changed the rx for diflucan to # 15 qty.take 1 a day as needed for no more than 3 days. Lm in detail on pt's vm.

## 2024-11-13 ENCOUNTER — TELEPHONE (OUTPATIENT)
Dept: SPORTS MEDICINE | Facility: CLINIC | Age: 59
End: 2024-11-13
Payer: COMMERCIAL

## 2024-11-13 DIAGNOSIS — M22.42 CHONDROMALACIA OF LEFT PATELLA: Primary | ICD-10-CM

## 2024-11-13 NOTE — TELEPHONE ENCOUNTER
Caller: Echo Montelongo    Relationship to patient: Self    Best call back number: [phone]      Type of visit: L KNEE GEL INJECTION    Requested date: MONDAYS OR WED/FRI AFTER 2:00PM    Additional notes: PT REQUESTING ANOTHER GEL INJECTION IN THE L KNEE. PLEASE CONTACT PT TO SCHEDULE.

## 2024-12-02 ENCOUNTER — PROCEDURE VISIT (OUTPATIENT)
Dept: SPORTS MEDICINE | Facility: CLINIC | Age: 59
End: 2024-12-02
Payer: COMMERCIAL

## 2024-12-02 VITALS
HEIGHT: 67 IN | OXYGEN SATURATION: 99 % | SYSTOLIC BLOOD PRESSURE: 110 MMHG | HEART RATE: 92 BPM | BODY MASS INDEX: 27.31 KG/M2 | TEMPERATURE: 96.7 F | DIASTOLIC BLOOD PRESSURE: 80 MMHG | WEIGHT: 174 LBS

## 2024-12-02 DIAGNOSIS — M22.42 CHONDROMALACIA OF LEFT PATELLA: ICD-10-CM

## 2024-12-02 PROCEDURE — 20611 DRAIN/INJ JOINT/BURSA W/US: CPT | Performed by: FAMILY MEDICINE

## 2024-12-02 RX ORDER — THYROID 60 MG/1
60 TABLET ORAL DAILY
Qty: 30 TABLET | Refills: 2 | Status: CANCELLED | OUTPATIENT
Start: 2024-12-02

## 2024-12-02 RX ORDER — DOXYCYCLINE 40 MG/1
40 CAPSULE ORAL DAILY
COMMUNITY
Start: 2024-11-01

## 2024-12-02 NOTE — PROGRESS NOTES
Here today for Monovisc injection, # 1/1    - Large Joint Arthrocentesis: L knee on 12/2/2024 11:33 AM  Indications: pain  Details: 22 G needle, ultrasound-guided superolateral approach  Medications: 88 mg Hyaluronan 88 MG/4ML  Outcome: tolerated well, no immediate complications  Procedure, treatment alternatives, risks and benefits explained, specific risks discussed. Immediately prior to procedure a time out was called to verify the correct patient, procedure, equipment, support staff and site/side marked as required. Patient was prepped and draped in the usual sterile fashion.            Diagnoses and all orders for this visit:    Chondromalacia of left patella  -     Visco Treatment  -     - Large Joint Arthrocentesis    Other orders  -     doxycycline (ORACEA) 40 MG capsule; Take 1 capsule by mouth Daily.        Injection tolerated well. This completes injection series. Patient will follow up as needed based on symptom progression.

## 2024-12-03 DIAGNOSIS — E78.2 MIXED HYPERLIPIDEMIA: ICD-10-CM

## 2024-12-03 DIAGNOSIS — K21.9 GASTROESOPHAGEAL REFLUX DISEASE, UNSPECIFIED WHETHER ESOPHAGITIS PRESENT: ICD-10-CM

## 2024-12-03 RX ORDER — EZETIMIBE 10 MG/1
10 TABLET ORAL DAILY
Qty: 90 TABLET | Refills: 0 | Status: SHIPPED | OUTPATIENT
Start: 2024-12-03

## 2024-12-03 RX ORDER — METFORMIN HYDROCHLORIDE 500 MG/1
500 TABLET, EXTENDED RELEASE ORAL NIGHTLY
Qty: 90 TABLET | Refills: 1 | Status: SHIPPED | OUTPATIENT
Start: 2024-12-03

## 2024-12-03 RX ORDER — METRONIDAZOLE 7.5 MG/G
GEL TOPICAL
Qty: 45 G | Refills: 1 | Status: SHIPPED | OUTPATIENT
Start: 2024-12-03

## 2024-12-03 RX ORDER — PANTOPRAZOLE SODIUM 40 MG/1
40 TABLET, DELAYED RELEASE ORAL DAILY
Qty: 90 TABLET | Refills: 2 | Status: SHIPPED | OUTPATIENT
Start: 2024-12-03

## 2024-12-04 RX ORDER — NITROFURANTOIN 25; 75 MG/1; MG/1
100 CAPSULE ORAL AS NEEDED
Qty: 24 CAPSULE | Refills: 0 | Status: SHIPPED | OUTPATIENT
Start: 2024-12-04

## 2024-12-09 ENCOUNTER — OFFICE VISIT (OUTPATIENT)
Dept: INTERNAL MEDICINE | Facility: CLINIC | Age: 59
End: 2024-12-09
Payer: COMMERCIAL

## 2024-12-09 VITALS
SYSTOLIC BLOOD PRESSURE: 122 MMHG | WEIGHT: 174.4 LBS | TEMPERATURE: 96.9 F | OXYGEN SATURATION: 100 % | HEART RATE: 86 BPM | DIASTOLIC BLOOD PRESSURE: 78 MMHG | BODY MASS INDEX: 27.37 KG/M2 | HEIGHT: 67 IN

## 2024-12-09 DIAGNOSIS — K80.20 CALCULUS OF GALLBLADDER WITHOUT CHOLECYSTITIS WITHOUT OBSTRUCTION: Primary | ICD-10-CM

## 2024-12-09 DIAGNOSIS — E78.2 MIXED HYPERLIPIDEMIA: ICD-10-CM

## 2024-12-09 PROCEDURE — 99213 OFFICE O/P EST LOW 20 MIN: CPT | Performed by: STUDENT IN AN ORGANIZED HEALTH CARE EDUCATION/TRAINING PROGRAM

## 2024-12-09 NOTE — PROGRESS NOTES
"Chief Complaint  Hyperlipidemia    Subjective        Echo Montelongo presents to [facility]  History of Present Illness    Presents to clinic today for follow-up    She is doing well since last visit.  She continues to be proactive with her health, wanted to discuss abnormal imaging findings previously where she was noted to have a large gallstone.  She is not necessarily having any symptoms related to biliary colic    Otherwise she is doing well thought with no other issues. Cholesterol continues to be relatively at goal       Objective   Vital Signs:  /78   Pulse 86   Temp 96.9 °F (36.1 °C) (Temporal)   Ht 168.9 cm (66.5\")   Wt 79.1 kg (174 lb 6.4 oz)   SpO2 100%   BMI 27.73 kg/m²   Estimated body mass index is 27.73 kg/m² as calculated from the following:    Height as of this encounter: 168.9 cm (66.5\").    Weight as of this encounter: 79.1 kg (174 lb 6.4 oz).            Physical Exam  Vitals reviewed.   Constitutional:       General: She is not in acute distress.     Appearance: Normal appearance. She is not toxic-appearing.   HENT:      Head: Normocephalic and atraumatic.   Eyes:      General: No scleral icterus.     Conjunctiva/sclera: Conjunctivae normal.   Skin:     General: Skin is warm and dry.   Neurological:      Mental Status: She is alert and oriented to person, place, and time.   Psychiatric:         Mood and Affect: Mood normal.         Behavior: Behavior normal.        Result Review :                Assessment and Plan   Diagnoses and all orders for this visit:    1. Calculus of gallbladder without cholecystitis without obstruction (Primary)  -     US Gallbladder; Future    2. Mixed hyperlipidemia      While she is not having any significant symptoms from previous gallstone that was reviewed and noted on 2018 imaging, given the size it I would like to obtain some further imaging.  I have reached out to general surgeon for his opinion on removal of " asymptomatic gallstones due to the size but for now we will obtain imaging and further evaluate pending repeat imaging    She had labs drawn holistic medicine doctor and cholesterol remains to be at goal, continue current Zetia    RTC in about 6mo             Follow Up   Return in about 6 months (around 6/9/2025).  Patient was given instructions and counseling regarding her condition or for health maintenance advice. Please see specific information pulled into the AVS if appropriate.

## 2024-12-13 ENCOUNTER — HOSPITAL ENCOUNTER (OUTPATIENT)
Dept: ULTRASOUND IMAGING | Facility: HOSPITAL | Age: 59
Discharge: HOME | End: 2024-12-13
Admitting: STUDENT IN AN ORGANIZED HEALTH CARE EDUCATION/TRAINING PROGRAM
Payer: COMMERCIAL

## 2024-12-13 DIAGNOSIS — K80.20 CALCULUS OF GALLBLADDER WITHOUT CHOLECYSTITIS WITHOUT OBSTRUCTION: ICD-10-CM

## 2024-12-13 PROCEDURE — 76705 ECHO EXAM OF ABDOMEN: CPT

## 2024-12-18 DIAGNOSIS — K80.20 CALCULUS OF GALLBLADDER WITHOUT CHOLECYSTITIS WITHOUT OBSTRUCTION: Primary | ICD-10-CM

## 2024-12-20 ENCOUNTER — TELEPHONE (OUTPATIENT)
Dept: SURGERY | Facility: CLINIC | Age: 59
End: 2024-12-20
Payer: COMMERCIAL

## 2024-12-20 NOTE — TELEPHONE ENCOUNTER
Attempted to contact patient to schedule appt as New problem for next yr. Left voicemail to call back.

## 2024-12-30 ENCOUNTER — TELEPHONE (OUTPATIENT)
Dept: SPORTS MEDICINE | Facility: CLINIC | Age: 59
End: 2024-12-30
Payer: COMMERCIAL

## 2024-12-30 DIAGNOSIS — M22.42 CHONDROMALACIA OF LEFT PATELLA: Primary | ICD-10-CM

## 2024-12-30 NOTE — TELEPHONE ENCOUNTER
Patient is requesting a prescription for a cream for pain. She received a gel injection on 12/2/24  Requesting a call back.    Please advise

## 2024-12-31 NOTE — TELEPHONE ENCOUNTER
Left patient a Vm informing her that prescription has been sent and Rx alternatives will contact her.

## 2025-01-07 ENCOUNTER — TELEPHONE (OUTPATIENT)
Dept: INTERNAL MEDICINE | Facility: CLINIC | Age: 60
End: 2025-01-07
Payer: COMMERCIAL

## 2025-01-07 NOTE — TELEPHONE ENCOUNTER
Pt would like to have a rx sent in for her nausea - she would like to have Zofran she is having some problems due to her gallbladder issue - pharmacy is on file

## 2025-01-08 DIAGNOSIS — R11.0 NAUSEA: Primary | ICD-10-CM

## 2025-01-08 RX ORDER — ONDANSETRON 4 MG/1
4 TABLET, ORALLY DISINTEGRATING ORAL EVERY 8 HOURS PRN
Qty: 30 TABLET | Refills: 0 | Status: SHIPPED | OUTPATIENT
Start: 2025-01-08

## 2025-02-13 ENCOUNTER — OFFICE VISIT (OUTPATIENT)
Dept: SURGERY | Facility: CLINIC | Age: 60
End: 2025-02-13
Payer: COMMERCIAL

## 2025-02-13 VITALS
SYSTOLIC BLOOD PRESSURE: 125 MMHG | BODY MASS INDEX: 28.19 KG/M2 | WEIGHT: 179.6 LBS | HEIGHT: 67 IN | DIASTOLIC BLOOD PRESSURE: 80 MMHG | HEART RATE: 92 BPM | OXYGEN SATURATION: 97 %

## 2025-02-13 DIAGNOSIS — Z12.11 SCREEN FOR COLON CANCER: ICD-10-CM

## 2025-02-13 DIAGNOSIS — K80.10 CALCULUS OF GALLBLADDER WITH CHRONIC CHOLECYSTITIS WITHOUT OBSTRUCTION: Primary | ICD-10-CM

## 2025-02-14 NOTE — PROGRESS NOTES
ASSESSMENT/PLAN:    59-year-old lady with symptomatic cholelithiasis.  Dr. Woodard and I discussed her options and she understands the options and wishes to proceed with laparoscopic cholecystectomy.  They understand nature of the procedure and the risks including but not limited to bleeding, infection, conversion to open procedure, postoperative bile leak, and the bowel function changes which can accompany cholecystectomy.  Additionally she is due for a colonoscopy, and we will likely schedule this prior to the gallbladder.  She will call back to schedule when she is ready.  All questions were answered at the time of this visit and they were willing to proceed with all recommendations.    CC:     Cholelithiasis    HPI:    This is a 59-year-old lady presenting to the office today at the request of Dr. Marty Long for consultation.  She has known about a large gallstone within her gallbladder for several years now but has never had any significant complaints related to it until recently.  Over the last few years she has begun to experience more frequent episodes of epigastric discomfort radiating to her back as well as indigestion and nausea that follows meals.  She says that this typically only occurs once a year but feels as if it is happening slightly more frequently now.  As a result she has had imaging studies that have demonstrated a 3 cm gallstone within her gallbladder, with the ultrasound most recently demonstrated mild to moderate gallbladder wall thickening.  Lastly she notes she is due for a colonoscopy as her last one was in 2013.  She has had no symptoms associated with this.    ENDOSCOPY:   Colonoscopy 2013 normal  EGD 2023 esophagitis, gastritis, duodenitis, hiatal hernia    RADIOLOGY:   Gallbladder ultrasound 12/13/2024: 3 cm gallstone, mild to moderate gallbladder wall thickening  CT abdomen pelvis 12/20/2018: 2.9 x 2.1 cm gallstone    SOCIAL HISTORY:   Denies tobacco use  Occasional alcohol  ICE catheter removed use    FAMILY HISTORY:    Colorectal cancer: None  Gallbladder Disease: None    PREVIOUS ABDOMINAL SURGERY    Abdominoplasty  Endometrial ablation  Anterior and posterior vaginal repair    OTHER SURGERY  Past Surgical History:   Procedure Laterality Date    ABDOMINOPLASTY      ANTERIOR AND POSTERIOR VAGINAL REPAIR N/A 2017    Procedure: PERINEOPLASTY AND EXCISION OF PERINEAL LESION;  Surgeon: Pino Ge MD;  Location: [facility] OR [facility];  Service:      SECTION      COLONOSCOPY N/A 2013    Katie FERMIN    COLPOSCOPY W/ BIOPSY / CURETTAGE  2015    benign    ELBOW PROCEDURE      Forearm fracture    ENDOMETRIAL ABLATION      ENDOSCOPY  2007    ENDOSCOPY N/A 2023    Procedure: ESOPHAGOGASTRODUODENOSCOPY WITH BIOPSIES AND 15 MM, 16.5 MM, AND 18 MM BALLOON DILATATION.;  Surgeon: Frandy Cisneros MD;  Location: [facility] ENDOSCOPY;  Service: Gastroenterology;  Laterality: N/A;  pre: DYSPHAGIA, GERD  post: HIATAL HERNIA, DUODENITIS, BILE REFLUX, GASTRITIS, ESOPHAGITIS, MILD ESOPHAGEAL STENOSIS    EYE SURGERY      FRACTURE SURGERY      MICROAMBULATORY PHLEBECTOMY Left 2023    Procedure: LEFT LEG PHLEBECTOMY;  Surgeon: Dina Brown MD;  Location: [facility] [place] MAIN OR;  Service: Vascular;  Laterality: Left;    ORBITAL FRACTURE OPEN REDUCTION INTERNAL FIXATION Right 10/18/2016    Procedure: REPAIR ORBITAL FLOOR FRACTURE;  Surgeon: Ernesto Cali MD;  Location: [facility] MAIN OR;  Service:     ORIF WRIST FRACTURE Right 10/18/2016    Procedure: RT ULNA/RADIUS OPEN REDUCTION INTERNAL FIXATION;  Surgeon: Karen Harris MD;  Location: [facility] MAIN OR;  Service:     PERINEOPLASTY      SHOULDER ARTHROSCOPY W/ ROTATOR CUFF REPAIR Right 2024    Procedure: RIGHT SHOULDER ARTHROSCOPY,  LABRAL DEBRIDEMENT, BICEPS TENODESIS,  DECOMPRESSION, AND ROTATOR CUFF REPAIR;  Surgeon: Suresh Monzon MD;  Location: [facility] OR [facility];  Service: Orthopedics;  Laterality: Right;    UPPER  GASTROINTESTINAL ENDOSCOPY  06/14/2013    Katie FERMIN       PAST MEDICAL HISTORY:    Past Medical History:   Diagnosis Date    Abnormal Pap smear of cervix     LGSIL,1/31/22,negative hpv    Anemia     Disease of thyroid gland     GERD (gastroesophageal reflux disease)     High cholesterol     BORDERLINE NO MEDICINE    History of reduction of orbital fracture     RIGHT    Hypothyroidism     Injury of back     Insulin resistance     Knee sprain     PONV (postoperative nausea and vomiting)     SEVERE N/V    Right shoulder pain     Screen for colon cancer 2/13/2025    Shoulder injury     Sinus congestion     Urinary incontinence        MEDICATIONS:     Current Outpatient Medications:     B Complex Vitamins (VITAMIN B COMPLEX PO), Take  by mouth., Disp: , Rfl:     doxycycline (ORACEA) 40 MG capsule, Take 1 capsule by mouth Daily., Disp: , Rfl:     estradiol (ESTRACE) 0.1 MG/GM vaginal cream, Insert 2 g into the vagina Daily., Disp: , Rfl:     ezetimibe (ZETIA) 10 MG tablet, Take 1 tablet by mouth Daily., Disp: 90 tablet, Rfl: 0    Ferrous Sulfate (IRON PO), Take 1 tablet by mouth Daily., Disp: , Rfl:     Ibuprofen 3 %, Gabapentin 10 %, Baclofen 2 %, lidocaine 4 %, Apply 1-2 g topically to the appropriate area as directed 3 (Three) to 4 (Four) times daily., Disp: 90 g, Rfl: 1    metFORMIN ER (GLUCOPHAGE-XR) 500 MG 24 hr tablet, Take 1 tablet by mouth Every Night., Disp: 90 tablet, Rfl: 1    metroNIDAZOLE (METROGEL) 0.75 % gel, Apply thin layer to the face twice daily. Apply a moisturizer afterwards., Disp: 45 g, Rfl: 1    Multiple Vitamin (MULTI-VITAMIN DAILY PO), Take 1 tablet by mouth Daily As Needed., Disp: , Rfl:     ondansetron ODT (ZOFRAN-ODT) 4 MG disintegrating tablet, Place 1 tablet on the tongue Every 8 (Eight) Hours As Needed for Nausea or Vomiting., Disp: 30 tablet, Rfl: 0    pantoprazole (PROTONIX) 40 MG EC tablet, Take 1 tablet by mouth Daily., Disp: 90 tablet, Rfl: 2    PROGESTERONE PO, Take 300 mg by mouth  "Daily., Disp: , Rfl:     Testosterone Cypionate (DEPOTESTOTERONE CYPIONATE) 200 MG/ML injection, Inject 1 mL into the appropriate muscle as directed by prescriber 1 (One) Time Per Week. Pt unsure of dosage once per week, Disp: , Rfl:     Thyroid (ARMOUR THYROID) 60 MG PO tablet, Take 1 tablet by mouth Daily., Disp: 90 tablet, Rfl: 2    vitamin D3 125 MCG (5000 UT) capsule capsule, Take 2 capsules every day by oral route at dinner for 90 days., Disp: , Rfl:     fluconazole (DIFLUCAN) 150 MG tablet, Take 1 tablet by mouth Daily As Needed. (Patient not taking: Reported on 2/13/2025), Disp: 15 tablet, Rfl: 3    nitrofurantoin, macrocrystal-monohydrate, (MACROBID) 100 MG capsule, Take 1 capsule by mouth As Needed for recurrent UTI (Patient not taking: Reported on 2/13/2025), Disp: 24 capsule, Rfl: 0    spironolactone (ALDACTONE) 100 MG tablet, Take 1 tablet by mouth Daily. (Patient not taking: Reported on 2/13/2025), Disp: 90 tablet, Rfl: 1    ALLERGIES:   Allergies   Allergen Reactions    Statins Myalgia       PHYSICAL EXAM:   Constitutional: Well-developed well-nourished, no acute distress  Vital signs:   Height 66.5\"  Weight 179  BMI 28.5  Neck: Supple, no palpable mass, trachea midline  Respiratory: Clear to auscultation, normal inspiratory effort  Cardiovascular: Regular rate, no murmur, no carotid bruit  Gastrointestinal: Soft, nontender  Psychiatric: Alert and oriented ×3, normal affect   BMI is >= 25 and <30. (Overweight) The following options were offered after discussion;: weight loss educational material (shared in after visit summary)        Jamie Corea PA-C    [facility] - General Surgery  [address], [address]  [place] [address]    [address], [address]  [place] [address]    Office: [phone]  Fax: [phone]     "

## 2025-02-14 NOTE — PROGRESS NOTES
I have reviewed the notes, assessments, and/or procedures performed by Jamie Corea, I concur with her/his documentation of Echo Montelongo.

## 2025-02-25 RX ORDER — METRONIDAZOLE 7.5 MG/G
GEL TOPICAL
Qty: 45 G | Refills: 1 | Status: SHIPPED | OUTPATIENT
Start: 2025-02-25

## 2025-03-03 ENCOUNTER — TELEPHONE (OUTPATIENT)
Dept: SURGERY | Facility: CLINIC | Age: 60
End: 2025-03-03
Payer: COMMERCIAL

## 2025-03-03 ENCOUNTER — OFFICE VISIT (OUTPATIENT)
Dept: OBSTETRICS AND GYNECOLOGY | Age: 60
End: 2025-03-03
Payer: COMMERCIAL

## 2025-03-03 VITALS
DIASTOLIC BLOOD PRESSURE: 72 MMHG | HEIGHT: 67 IN | SYSTOLIC BLOOD PRESSURE: 116 MMHG | BODY MASS INDEX: 27.94 KG/M2 | WEIGHT: 178 LBS

## 2025-03-03 DIAGNOSIS — R39.9 UTI SYMPTOMS: ICD-10-CM

## 2025-03-03 DIAGNOSIS — K64.4 EXTERNAL HEMORRHOIDS: ICD-10-CM

## 2025-03-03 DIAGNOSIS — Z01.419 ENCOUNTER FOR GYNECOLOGICAL EXAMINATION: Primary | ICD-10-CM

## 2025-03-03 DIAGNOSIS — Z12.31 BREAST CANCER SCREENING BY MAMMOGRAM: ICD-10-CM

## 2025-03-03 DIAGNOSIS — N39.3 SUI (STRESS URINARY INCONTINENCE, FEMALE): ICD-10-CM

## 2025-03-03 LAB
BILIRUB BLD-MCNC: NEGATIVE MG/DL
CLARITY, POC: CLEAR
COLOR UR: YELLOW
GLUCOSE UR STRIP-MCNC: NEGATIVE MG/DL
KETONES UR QL: NEGATIVE
LEUKOCYTE EST, POC: NEGATIVE
NITRITE UR-MCNC: NEGATIVE MG/ML
PH UR: 6 [PH] (ref 5–8)
PROT UR STRIP-MCNC: NEGATIVE MG/DL
RBC # UR STRIP: NEGATIVE /UL
SP GR UR: 1.02 (ref 1–1.03)
UROBILINOGEN UR QL: ABNORMAL

## 2025-03-03 RX ORDER — DIPHENHYDRAMINE HCL 25 MG
25 CAPSULE ORAL EVERY 6 HOURS PRN
COMMUNITY

## 2025-03-03 NOTE — PROGRESS NOTES
Subjective   Chief Complaint   Patient presents with    Gynecologic Exam     Annual:last pap ,mammo ,colonoscopy ,appt.,dexa ,Discuss urinary frequency,random bladder leakage,? Yeast infection      History of Present Illness  Wellness exam  Echo Montelongo is a very pleasant  59 y.o. female .  , Mammo Exam due in April of this year, , Exercise encouraged  She overall remains healthy, using compounding estrogen and progesterone and testosterone from an outside source  Continues to have some urinary symptoms.  Interestingly her urine is negative again on analysis which it was last year  But she started to have CRISPIN symptoms associate with coughing sneezing running and intercourse.  She does not have any symptoms of urge incontinence or overactive bladder.    Additionally she is scheduled to have a cholecystectomy in the near future  She had mentioned to the general surgeon that she has hemorrhoids and was going to discuss that a little bit further at her next visit    She is also due for a repeat colonoscopy sometime this year.    Obstetric History:  OB History          3    Para   3    Term   2       1    AB        Living   2         SAB        IAB        Ectopic        Molar        Multiple        Live Births   2               Menstrual History:     No LMP recorded (lmp unknown). Patient is postmenopausal.       Sexual History:       Past Medical History:   Diagnosis Date    Abnormal Pap smear of cervix     LGSIL,22,negative hpv    Anemia     Disease of thyroid gland     GERD (gastroesophageal reflux disease)     High cholesterol     BORDERLINE NO MEDICINE    History of reduction of orbital fracture     RIGHT    Hypothyroidism     Injury of back     Insulin resistance     Knee sprain     PONV (postoperative nausea and vomiting)     SEVERE N/V    Right shoulder pain     Screen for colon cancer 2025    Shoulder injury     Sinus congestion     Urinary incontinence      Past  Surgical History:   Procedure Laterality Date    ABDOMINOPLASTY      ANTERIOR AND POSTERIOR VAGINAL REPAIR N/A 2017    Procedure: PERINEOPLASTY AND EXCISION OF PERINEAL LESION;  Surgeon: Pino Ge MD;  Location: [facility];  Service:      SECTION      COLONOSCOPY N/A 2013    Katie FERMIN    COLPOSCOPY W/ BIOPSY / CURETTAGE  2015    benign    ELBOW PROCEDURE      Forearm fracture    ENDOMETRIAL ABLATION      ENDOSCOPY  2007    ENDOSCOPY N/A 2023    Procedure: ESOPHAGOGASTRODUODENOSCOPY WITH BIOPSIES AND 15 MM, 16.5 MM, AND 18 MM BALLOON DILATATION.;  Surgeon: Frandy Cisneros MD;  Location: [facility] ENDOSCOPY;  Service: Gastroenterology;  Laterality: N/A;  pre: DYSPHAGIA, GERD  post: HIATAL HERNIA, DUODENITIS, BILE REFLUX, GASTRITIS, ESOPHAGITIS, MILD ESOPHAGEAL STENOSIS    EYE SURGERY      FRACTURE SURGERY      MICROAMBULATORY PHLEBECTOMY Left 2023    Procedure: LEFT LEG PHLEBECTOMY;  Surgeon: Dina Brown MD;  Location: [facility] OR;  Service: Vascular;  Laterality: Left;    ORBITAL FRACTURE OPEN REDUCTION INTERNAL FIXATION Right 10/18/2016    Procedure: REPAIR ORBITAL FLOOR FRACTURE;  Surgeon: Ernesto Cali MD;  Location: [facility] OR;  Service:     ORIF WRIST FRACTURE Right 10/18/2016    Procedure: RT ULNA/RADIUS OPEN REDUCTION INTERNAL FIXATION;  Surgeon: Karen Harris MD;  Location: [facility] OR;  Service:     PERINEOPLASTY      SHOULDER ARTHROSCOPY W/ ROTATOR CUFF REPAIR Right 2024    Procedure: RIGHT SHOULDER ARTHROSCOPY,  LABRAL DEBRIDEMENT, BICEPS TENODESIS,  DECOMPRESSION, AND ROTATOR CUFF REPAIR;  Surgeon: Suresh Monzon MD;  Location: [facility] OR [facility] [place];  Service: Orthopedics;  Laterality: Right;    UPPER GASTROINTESTINAL ENDOSCOPY  2013    Katie FERMIN       Current Outpatient Medications:     B Complex Vitamins (VITAMIN B COMPLEX PO), Take  by mouth., Disp: , Rfl:     diphenhydrAMINE (BENADRYL) 25 mg capsule,  Take 1 capsule by mouth Every 6 (Six) Hours As Needed for Itching., Disp: , Rfl:     estradiol (ESTRACE) 0.1 MG/GM vaginal cream, Insert 2 g into the vagina Daily., Disp: , Rfl:     ezetimibe (ZETIA) 10 MG tablet, Take 1 tablet by mouth Daily., Disp: 90 tablet, Rfl: 0    Ferrous Sulfate (IRON PO), Take 1 tablet by mouth Daily., Disp: , Rfl:     Ibuprofen 3 %, Gabapentin 10 %, Baclofen 2 %, lidocaine 4 %, Apply 1-2 g topically to the appropriate area as directed 3 (Three) to 4 (Four) times daily., Disp: 90 g, Rfl: 1    metFORMIN ER (GLUCOPHAGE-XR) 500 MG 24 hr tablet, Take 1 tablet by mouth Every Night., Disp: 90 tablet, Rfl: 1    metroNIDAZOLE (METROGEL) 0.75 % gel, Apply thin layer to the face twice daily. Apply a moisturizer afterwards., Disp: 45 g, Rfl: 1    Multiple Vitamin (MULTI-VITAMIN DAILY PO), Take 1 tablet by mouth Daily As Needed., Disp: , Rfl:     ondansetron ODT (ZOFRAN-ODT) 4 MG disintegrating tablet, Place 1 tablet on the tongue Every 8 (Eight) Hours As Needed for Nausea or Vomiting., Disp: 30 tablet, Rfl: 0    pantoprazole (PROTONIX) 40 MG EC tablet, Take 1 tablet by mouth Daily., Disp: 90 tablet, Rfl: 2    PROGESTERONE PO, Take 300 mg by mouth Daily., Disp: , Rfl:     Testosterone Cypionate (DEPOTESTOTERONE CYPIONATE) 200 MG/ML injection, Inject 1 mL into the appropriate muscle as directed by prescriber 1 (One) Time Per Week. Pt unsure of dosage once per week, Disp: , Rfl:     Thyroid (ARMOUR THYROID) 60 MG PO tablet, Take 1 tablet by mouth Daily., Disp: 90 tablet, Rfl: 2    vitamin D3 125 MCG (5000 UT) capsule capsule, Take 2 capsules every day by oral route at dinner for 90 days., Disp: , Rfl:     doxycycline (ORACEA) 40 MG capsule, Take 1 capsule by mouth Daily. (Patient not taking: Reported on 3/3/2025), Disp: , Rfl:    SOCIAL Hx:  [unfilled]    The following portions of the patient's history were reviewed and updated as appropriate: allergies, current medications, past family history, past  "medical history, past social history, past surgical history and problem list.    Review of Systems      Urinary incontinence assessment discussed      Except as outlined in history of physical illness, patient denies any changes in her GYN, , GI systems.  All other systems reviewed are negative         Objective   Physical Exam    /72   Ht 168.9 cm (66.5\")   Wt 80.7 kg (178 lb)   LMP  (LMP Unknown)   BMI 28.30 kg/m²     General: Patient is alert and oriented and appears overall healthy  Neck: Is supple without thyromegaly, no carotid bruits and no lymphadenopathy  Lungs: Clear bilaterally, no wheezing, rhonchi, or rales.  Respiratory rate is normal  Breast: Even symmetrical, no lymphadenopathy, no retraction, no discharge ,no masses or lumps appreciated on either side  Heart: Regular rate and rhythm are appreciated, no murmurs or rubs are heard  Abdomen: Is soft, without organomegaly, bowel sounds are positive, there is no rebound or guarding and palpation does not produce any discomfort  Back: Nontender without CVA tenderness  Pelvic: External genitalia appear normal and consistent with mature female.  BUS normal                Urethra appears normal and without mass, bladder is nontender and without any lesions                        Urethral meatus is normal without scarring tenderness or masses                 Bladder is without tenderness or fullness                           Vagina is clean dry without discharge and , no lesions or masses are present                         Cervix is noninflamed without discharge or lesions.  There is no cervical motion tenderness.                Uterus is nonenlarged, without tenderness, and no masses or abnormalities are  present               Adnexa are non-enlarged, non tender               Rectal digital  exam reveals adequate sphincter tone and no masses or lesions are appreciated on digital rectal examination.       Patient Active Problem List   Diagnosis    " Keratitis sicca, both eyes    Rosacea blepharoconjunctivitis    Chronic fatigue    Alopecia areata    GERD (gastroesophageal reflux disease)    Spina bifida occulta    Mixed hyperlipidemia    HPV in female    Calculus of gallbladder without cholecystitis    Calculus of kidney    Dysphagia    Endocervical polyp    Calculus of gallbladder with chronic cholecystitis without obstruction    Screen for colon cancer    CRISPIN (stress urinary incontinence, female)    External hemorrhoids                Assessment & Plan   Diagnoses and all orders for this visit:    1. Encounter for gynecological examination (Primary)  -     IGP, Apt HPV,rfx 16 / 18,45  -     POC Urinalysis Dipstick    2. Breast cancer screening by mammogram      Mammogram ordered  3. UTI symptoms      Urinalysis negative again, will send for culture  4. CRISPIN (stress urinary incontinence, female)  Overview:  With running, coughing, sneezing, intercourse  No symptoms of urge incontinence    Orders:  -     Ambulatory Referral to Physical Therapy for Evaluation & Treatment  Negative urinalysis x 2 will send for culture  Discussed reassuring physical exam with very little relaxation on pelvic examination.  Patient has already tried some Kegel exercises.  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    5. External hemorrhoids  Overview:  Nonthrombosed         Discussed today's findings and concerns with patient.  Continue to recommend regular exercise including cardiovascular and resistance training as well as  breast self-exam. Wellness lab, mammography, & pap smear, in accordance with age guidelines.    I have encouraged her to call for today's test results if she has not received them within 10 days.  Patient is advised to call with any change in her condition or with any other questions, otherwise return  for annual  examination.

## 2025-03-03 NOTE — TELEPHONE ENCOUNTER
Patient wants to schedule gall bladder sx with [facility] at [facility] on 04/14 or 04/28; checking with employer on best dates and will call back to schedule.  She is aware that she needs to do colonoscopy as well but will schedule that at later date.

## 2025-03-05 LAB
BACTERIA UR CULT: NO GROWTH
BACTERIA UR CULT: NORMAL
CYTOLOGIST CVX/VAG CYTO: NORMAL
CYTOLOGY CVX/VAG DOC CYTO: NORMAL
CYTOLOGY CVX/VAG DOC THIN PREP: NORMAL
DX ICD CODE: NORMAL
HPV I/H RISK 4 DNA CVX QL PROBE+SIG AMP: NEGATIVE
OTHER STN SPEC: NORMAL
SERVICE CMNT-IMP: NORMAL
STAT OF ADQ CVX/VAG CYTO-IMP: NORMAL

## 2025-03-31 ENCOUNTER — PRE-ADMISSION TESTING (OUTPATIENT)
Dept: PREADMISSION TESTING | Facility: HOSPITAL | Age: 60
End: 2025-03-31
Payer: COMMERCIAL

## 2025-03-31 VITALS
BODY MASS INDEX: 28.25 KG/M2 | RESPIRATION RATE: 16 BRPM | HEART RATE: 83 BPM | SYSTOLIC BLOOD PRESSURE: 118 MMHG | OXYGEN SATURATION: 100 % | HEIGHT: 67 IN | DIASTOLIC BLOOD PRESSURE: 72 MMHG | WEIGHT: 180 LBS

## 2025-03-31 LAB
ANION GAP SERPL CALCULATED.3IONS-SCNC: 9.3 MMOL/L (ref 5–15)
BUN SERPL-MCNC: 11 MG/DL (ref 6–20)
BUN/CREAT SERPL: 14.7 (ref 7–25)
CALCIUM SPEC-SCNC: 8.9 MG/DL (ref 8.6–10.5)
CHLORIDE SERPL-SCNC: 105 MMOL/L (ref 98–107)
CO2 SERPL-SCNC: 23.7 MMOL/L (ref 22–29)
CREAT SERPL-MCNC: 0.75 MG/DL (ref 0.57–1)
DEPRECATED RDW RBC AUTO: 41.5 FL (ref 37–54)
EGFRCR SERPLBLD CKD-EPI 2021: 92.4 ML/MIN/1.73
ERYTHROCYTE [DISTWIDTH] IN BLOOD BY AUTOMATED COUNT: 11.9 % (ref 12.3–15.4)
GLUCOSE SERPL-MCNC: 100 MG/DL (ref 65–99)
HBA1C MFR BLD: 5.2 % (ref 4.8–5.6)
HCT VFR BLD AUTO: 43.2 % (ref 34–46.6)
HGB BLD-MCNC: 14.4 G/DL (ref 12–15.9)
MCH RBC QN AUTO: 31.6 PG (ref 26.6–33)
MCHC RBC AUTO-ENTMCNC: 33.3 G/DL (ref 31.5–35.7)
MCV RBC AUTO: 94.7 FL (ref 79–97)
PLATELET # BLD AUTO: 264 10*3/MM3 (ref 140–450)
PMV BLD AUTO: 10.4 FL (ref 6–12)
POTASSIUM SERPL-SCNC: 4.1 MMOL/L (ref 3.5–5.2)
RBC # BLD AUTO: 4.56 10*6/MM3 (ref 3.77–5.28)
SODIUM SERPL-SCNC: 138 MMOL/L (ref 136–145)
WBC NRBC COR # BLD AUTO: 5.41 10*3/MM3 (ref 3.4–10.8)

## 2025-03-31 PROCEDURE — 80048 BASIC METABOLIC PNL TOTAL CA: CPT

## 2025-03-31 PROCEDURE — 36415 COLL VENOUS BLD VENIPUNCTURE: CPT

## 2025-03-31 PROCEDURE — 83036 HEMOGLOBIN GLYCOSYLATED A1C: CPT

## 2025-03-31 PROCEDURE — 85027 COMPLETE CBC AUTOMATED: CPT

## 2025-03-31 NOTE — PAT
Pt here for PAT visit.  Pre-op tests completed, chg soap given, and instructions reviewed.  Instructed clears until 2 hrs prior to arrival time, voiced understanding.

## 2025-03-31 NOTE — DISCHARGE INSTRUCTIONS
PRE-ADMISSION TESTING INSTRUCTIONS FOR ADULTS    Take these medications the morning of surgery with a small sip of water:  [place] thyroid       Do not take any insulin or diabetes medications the morning of surgery.      No aspirin, advil, aleve, ibuprofen, naproxen, diet pills, decongestants, or herbal/vitamins for a week prior to surgery.       Tylenol/Acetaminophen is okay to take if needed.    General Instructions:    DO NOT EAT SOLID FOOD AFTER MIDNIGHT THE NIGHT BEFORE SURGERY. No gum, mints, or hard candy after midnight the night before surgery.  You may drink clear liquids the day of surgery up until 2 hours before your arrival time.  Clear liquids are liquids you can see through. Nothing RED in color.    Plain water    Sports drinks      Gelatin (Jell-O)  Fruit juices without pulp such as white grape juice and apple juice  Popsicles that contain no fruit or yogurt  Tea or coffee (no cream or milk added)    It is beneficial for you to have a clear drink that contains carbohydrates 2 hours before your arrival time.  We suggest a 20 ounce bottle of Gatorade or Powerade for non-diabetic patients or a 20 ounce bottle of Gatorade Zero or Powerade Zero for diabetic patients.     Patients who avoid smoking, chewing tobacco and alcohol for 4 weeks prior to surgery have a reduced risk of post-operative complications.  If at all possible, quit smoking as many days before surgery as you can.    Do not smoke, use chewing tobacco or drink alcohol the day of surgery    Bring your C-PAP/ BI-PAP machine if you use one.  Wear clean comfortable clothes.  Do not wear contact lenses, lotion, deodorant, or make-up.  Bring a case for your glasses if applicable. You may brush your teeth the morning of surgery.  You may wear dentures/partials, do not put adhesive/glue on them.  Leave all other jewelry and valuables at home.      Preventing a Surgical Site Infection:    Shower the night before and on the morning of surgery using the  chlorhexidine soap you were given.  Use a clean washcloth with the soap.  Place clean sheets on your bed after showering the night before surgery. Do not use the CHG soap on your hair, face, or private areas. Wash your body gently for five (5) minutes. Do not scrub your skin.  Dry with a clean towel and dress in clean clothing.  Do not shave the surgical area for 10 days-2 weeks prior to surgery  because the razor can irritate skin and make it easier to develop an infection.  Make sure you, your family, and all healthcare providers clean their hands with soap and water or an alcohol based hand  before caring for you or your wound.      Day of surgery:    Your surgeon’s office will advise you of your arrival time for the day of surgery.    Upon arrival, a Pre-op nurse and Anesthesia provider will review your health history, obtain vital signs, and answer questions you may have. The anesthesia provider will also discuss the type of anesthesia that will be needed for your procedure, which may include general anesthesia. The only belongings needed at this time will be your home medications and if applicable your C-PAP/BI-PAP machine.  If you are staying overnight your family can leave the rest of your belongings in the car and bring them to your room later.  A Pre-op nurse will start an IV and you may receive medication in preparation for surgery, including something to help you relax.  Your family will be able to see you in the Pre-op area.  While you are in surgery your family should notify the waiting room  if they leave the waiting room area and provide a contact phone number.    IF you have any questions, you can call the Pre-Admission Department at [phone] or your surgeon's office.  Notify your surgeon if  you become sick, have a fever, productive cough, or cannot be here the day of surgery    Please be aware that surgery does come with discomfort.  We want to make every effort to  control your discomfort so please discuss any uncontrolled symptoms with your nurse.   Your doctor will most likely have prescribed pain medications.      If you are going home after surgery, you will receive individualized written care instructions before being discharged.  A responsible adult (over the age of 18) must drive you to and from the hospital on the day of your surgery and stay with you for 24 hours after anesthesia.    If you are staying overnight following surgery, you will be transported to your hospital room following the recovery period.  [facility] has all private rooms.    You may receive a survey regarding the care you received. Your feedback is very important and will be used to collect the necessary data to help us to continue to provide excellent care.     Deductibles and co-payments are collected on the day of service. Please be prepared to pay the required co-pay, deductible or deposit on the day of service as defined by your plan.

## 2025-04-07 ENCOUNTER — HOSPITAL ENCOUNTER (OUTPATIENT)
Facility: HOSPITAL | Age: 60
Discharge: HOME | End: 2025-04-07
Admitting: OBSTETRICS & GYNECOLOGY
Payer: COMMERCIAL

## 2025-04-07 DIAGNOSIS — Z12.31 BREAST CANCER SCREENING BY MAMMOGRAM: ICD-10-CM

## 2025-04-07 PROCEDURE — 77063 BREAST TOMOSYNTHESIS BI: CPT

## 2025-04-07 PROCEDURE — 77067 SCR MAMMO BI INCL CAD: CPT

## 2025-04-09 ENCOUNTER — ANESTHESIA EVENT (OUTPATIENT)
Dept: PERIOP | Facility: HOSPITAL | Age: 60
End: 2025-04-09
Payer: COMMERCIAL

## 2025-04-10 ENCOUNTER — ANESTHESIA EVENT (OUTPATIENT)
Dept: PERIOP | Facility: HOSPITAL | Age: 60
End: 2025-04-10
Payer: COMMERCIAL

## 2025-04-10 ENCOUNTER — TELEPHONE (OUTPATIENT)
Dept: SURGERY | Facility: CLINIC | Age: 60
End: 2025-04-10
Payer: COMMERCIAL

## 2025-04-10 NOTE — TELEPHONE ENCOUNTER
Attempted to call pt, checked that she has verbal with us to LVM, left a VM stating that her [place] paperwork is faxed and uploaded to her MyChart. Advised to give us a call if have any questions.

## 2025-04-11 ENCOUNTER — ANESTHESIA (OUTPATIENT)
Dept: PERIOP | Facility: HOSPITAL | Age: 60
End: 2025-04-11
Payer: COMMERCIAL

## 2025-04-11 ENCOUNTER — HOSPITAL ENCOUNTER (OUTPATIENT)
Facility: HOSPITAL | Age: 60
Setting detail: HOSPITAL OUTPATIENT SURGERY
Discharge: HOME | End: 2025-04-11
Attending: SURGERY | Admitting: SURGERY
Payer: COMMERCIAL

## 2025-04-11 VITALS
WEIGHT: 178 LBS | SYSTOLIC BLOOD PRESSURE: 102 MMHG | RESPIRATION RATE: 16 BRPM | OXYGEN SATURATION: 95 % | DIASTOLIC BLOOD PRESSURE: 64 MMHG | TEMPERATURE: 97.8 F | BODY MASS INDEX: 27.88 KG/M2 | HEART RATE: 73 BPM

## 2025-04-11 PROCEDURE — 25010000002 LIDOCAINE 2% SOLUTION

## 2025-04-11 PROCEDURE — 45378 DIAGNOSTIC COLONOSCOPY: CPT | Performed by: SURGERY

## 2025-04-11 PROCEDURE — 25810000003 LACTATED RINGERS PER 1000 ML: Performed by: NURSE ANESTHETIST, CERTIFIED REGISTERED

## 2025-04-11 PROCEDURE — 25010000002 PROPOFOL 200 MG/20ML EMULSION

## 2025-04-11 PROCEDURE — S0260 H&P FOR SURGERY: HCPCS | Performed by: SURGERY

## 2025-04-11 PROCEDURE — 25010000002 ONDANSETRON PER 1 MG: Performed by: NURSE ANESTHETIST, CERTIFIED REGISTERED

## 2025-04-11 RX ORDER — LIDOCAINE HYDROCHLORIDE 10 MG/ML
0.5 INJECTION, SOLUTION EPIDURAL; INFILTRATION; INTRACAUDAL; PERINEURAL ONCE AS NEEDED
Status: DISCONTINUED | OUTPATIENT
Start: 2025-04-11 | End: 2025-04-11 | Stop reason: HOSPADM

## 2025-04-11 RX ORDER — ONDANSETRON 2 MG/ML
4 INJECTION INTRAMUSCULAR; INTRAVENOUS ONCE AS NEEDED
Status: DISCONTINUED | OUTPATIENT
Start: 2025-04-11 | End: 2025-04-11 | Stop reason: HOSPADM

## 2025-04-11 RX ORDER — SODIUM CHLORIDE, SODIUM LACTATE, POTASSIUM CHLORIDE, CALCIUM CHLORIDE 600; 310; 30; 20 MG/100ML; MG/100ML; MG/100ML; MG/100ML
9 INJECTION, SOLUTION INTRAVENOUS CONTINUOUS
Status: DISCONTINUED | OUTPATIENT
Start: 2025-04-11 | End: 2025-04-11 | Stop reason: HOSPADM

## 2025-04-11 RX ORDER — ONDANSETRON 2 MG/ML
4 INJECTION INTRAMUSCULAR; INTRAVENOUS ONCE
Status: COMPLETED | OUTPATIENT
Start: 2025-04-11 | End: 2025-04-11

## 2025-04-11 RX ORDER — SODIUM CHLORIDE, SODIUM LACTATE, POTASSIUM CHLORIDE, CALCIUM CHLORIDE 600; 310; 30; 20 MG/100ML; MG/100ML; MG/100ML; MG/100ML
100 INJECTION, SOLUTION INTRAVENOUS ONCE
Status: DISCONTINUED | OUTPATIENT
Start: 2025-04-11 | End: 2025-04-11 | Stop reason: HOSPADM

## 2025-04-11 RX ORDER — SODIUM CHLORIDE 0.9 % (FLUSH) 0.9 %
10 SYRINGE (ML) INJECTION AS NEEDED
Status: DISCONTINUED | OUTPATIENT
Start: 2025-04-11 | End: 2025-04-11 | Stop reason: HOSPADM

## 2025-04-11 RX ORDER — LIDOCAINE HYDROCHLORIDE 20 MG/ML
INJECTION, SOLUTION INFILTRATION; PERINEURAL AS NEEDED
Status: DISCONTINUED | OUTPATIENT
Start: 2025-04-11 | End: 2025-04-11 | Stop reason: SURG

## 2025-04-11 RX ORDER — PROPOFOL 10 MG/ML
INJECTION, EMULSION INTRAVENOUS AS NEEDED
Status: DISCONTINUED | OUTPATIENT
Start: 2025-04-11 | End: 2025-04-11 | Stop reason: SURG

## 2025-04-11 RX ADMIN — SODIUM CHLORIDE, SODIUM LACTATE, POTASSIUM CHLORIDE, CALCIUM CHLORIDE 9 ML/HR: 600; 310; 30; 20 INJECTION, SOLUTION INTRAVENOUS at 09:28

## 2025-04-11 RX ADMIN — PROPOFOL 240 MG: 10 INJECTION, EMULSION INTRAVENOUS at 09:41

## 2025-04-11 RX ADMIN — ONDANSETRON 4 MG: 2 INJECTION INTRAMUSCULAR; INTRAVENOUS at 09:28

## 2025-04-11 RX ADMIN — LIDOCAINE HYDROCHLORIDE 60 MG: 20 INJECTION, SOLUTION INFILTRATION; PERINEURAL at 09:39

## 2025-04-11 NOTE — H&P
The following note was reviewed and there have been no substantive changes:    ASSESSMENT/PLAN:    59-year-old lady with symptomatic cholelithiasis.  Dr. Woodard and I discussed her options and she understands the options and wishes to proceed with laparoscopic cholecystectomy.  They understand nature of the procedure and the risks including but not limited to bleeding, infection, conversion to open procedure, postoperative bile leak, and the bowel function changes which can accompany cholecystectomy.  Additionally she is due for a colonoscopy, and we will likely schedule this prior to the gallbladder.  She will call back to schedule when she is ready.  All questions were answered at the time of this visit and they were willing to proceed with all recommendations.     CC:        Cholelithiasis     HPI:    This is a 59-year-old lady presenting to the office today at the request of Dr. Marty Long for consultation.  She has known about a large gallstone within her gallbladder for several years now but has never had any significant complaints related to it until recently.  Over the last few years she has begun to experience more frequent episodes of epigastric discomfort radiating to her back as well as indigestion and nausea that follows meals.  She says that this typically only occurs once a year but feels as if it is happening slightly more frequently now.  As a result she has had imaging studies that have demonstrated a 3 cm gallstone within her gallbladder, with the ultrasound most recently demonstrated mild to moderate gallbladder wall thickening.  Lastly she notes she is due for a colonoscopy as her last one was in 2013.  She has had no symptoms associated with this.     ENDOSCOPY:   Colonoscopy 2013 normal  EGD 2023 esophagitis, gastritis, duodenitis, hiatal hernia     RADIOLOGY:   Gallbladder ultrasound 12/13/2024: 3 cm gallstone, mild to moderate gallbladder wall thickening  CT abdomen pelvis 12/20/2018:  2.9 x 2.1 cm gallstone     SOCIAL HISTORY:   Denies tobacco use  Occasional alcohol use     FAMILY HISTORY:    Colorectal cancer: None  Gallbladder Disease: None     PREVIOUS ABDOMINAL SURGERY    Abdominoplasty  Endometrial ablation  Anterior and posterior vaginal repair     OTHER SURGERY  Surgical History         Past Surgical History:   Procedure Laterality Date    ABDOMINOPLASTY        ANTERIOR AND POSTERIOR VAGINAL REPAIR N/A 2017     Procedure: PERINEOPLASTY AND EXCISION OF PERINEAL LESION;  Surgeon: Pino Ge MD;  Location: [facility] OR [facility];  Service:      SECTION        COLONOSCOPY N/A 2013     Katie FERMIN    COLPOSCOPY W/ BIOPSY / CURETTAGE   2015     benign    ELBOW PROCEDURE         Forearm fracture    ENDOMETRIAL ABLATION       ENDOSCOPY   2007    ENDOSCOPY N/A 2023     Procedure: ESOPHAGOGASTRODUODENOSCOPY WITH BIOPSIES AND 15 MM, 16.5 MM, AND 18 MM BALLOON DILATATION.;  Surgeon: Frandy Cisneros MD;  Location: [facility] ENDOSCOPY;  Service: Gastroenterology;  Laterality: N/A;  pre: DYSPHAGIA, GERD  post: HIATAL HERNIA, DUODENITIS, BILE REFLUX, GASTRITIS, ESOPHAGITIS, MILD ESOPHAGEAL STENOSIS    EYE SURGERY        FRACTURE SURGERY        MICROAMBULATORY PHLEBECTOMY Left 2023     Procedure: LEFT LEG PHLEBECTOMY;  Surgeon: Dina Brown MD;  [place] [facility];  Service: Vascular;  Laterality: Left;    ORBITAL FRACTURE OPEN REDUCTION INTERNAL FIXATION Right 10/18/2016     Procedure: REPAIR ORBITAL FLOOR FRACTURE;  Surgeon: Ernesto Cali MD;  Location: [facility] OR;  Service:     ORIF WRIST FRACTURE Right 10/18/2016     Procedure: RT ULNA/RADIUS OPEN REDUCTION INTERNAL FIXATION;  Surgeon: Karen Harris MD;  Location: [facility] OR;  Service:     PERINEOPLASTY        SHOULDER ARTHROSCOPY W/ ROTATOR CUFF REPAIR Right 2024     Procedure: RIGHT SHOULDER ARTHROSCOPY,  LABRAL DEBRIDEMENT, BICEPS TENODESIS,  DECOMPRESSION, AND  ROTATOR CUFF REPAIR;  Surgeon: Suresh Monzon MD;  Location: [facility] OR [facility] [place] [facility];  Service: Orthopedics;  Laterality: Right;    UPPER GASTROINTESTINAL ENDOSCOPY   06/14/2013     Katie FERMIN            PAST MEDICAL HISTORY:    Medical History        Past Medical History:   Diagnosis Date    Abnormal Pap smear of cervix       LGSIL,1/31/22,negative hpv    Anemia      Disease of thyroid gland      GERD (gastroesophageal reflux disease)      High cholesterol       BORDERLINE NO MEDICINE    History of reduction of orbital fracture       RIGHT    Hypothyroidism      Injury of back      Insulin resistance      Knee sprain      PONV (postoperative nausea and vomiting)       SEVERE N/V    Right shoulder pain      Screen for colon cancer 2/13/2025    Shoulder injury      Sinus congestion      Urinary incontinence              MEDICATIONS:     Current Medications      Current Outpatient Medications:     B Complex Vitamins (VITAMIN B COMPLEX PO), Take  by mouth., Disp: , Rfl:     doxycycline (ORACEA) 40 MG capsule, Take 1 capsule by mouth Daily., Disp: , Rfl:     estradiol (ESTRACE) 0.1 MG/GM vaginal cream, Insert 2 g into the vagina Daily., Disp: , Rfl:     ezetimibe (ZETIA) 10 MG tablet, Take 1 tablet by mouth Daily., Disp: 90 tablet, Rfl: 0    Ferrous Sulfate (IRON PO), Take 1 tablet by mouth Daily., Disp: , Rfl:     Ibuprofen 3 %, Gabapentin 10 %, Baclofen 2 %, lidocaine 4 %, Apply 1-2 g topically to the appropriate area as directed 3 (Three) to 4 (Four) times daily., Disp: 90 g, Rfl: 1    metFORMIN ER (GLUCOPHAGE-XR) 500 MG 24 hr tablet, Take 1 tablet by mouth Every Night., Disp: 90 tablet, Rfl: 1    metroNIDAZOLE (METROGEL) 0.75 % gel, Apply thin layer to the face twice daily. Apply a moisturizer afterwards., Disp: 45 g, Rfl: 1    Multiple Vitamin (MULTI-VITAMIN DAILY PO), Take 1 tablet by mouth Daily As Needed., Disp: , Rfl:     ondansetron ODT (ZOFRAN-ODT) 4 MG disintegrating tablet, Place 1 tablet on the tongue Every 8  "(Eight) Hours As Needed for Nausea or Vomiting., Disp: 30 tablet, Rfl: 0    pantoprazole (PROTONIX) 40 MG EC tablet, Take 1 tablet by mouth Daily., Disp: 90 tablet, Rfl: 2    PROGESTERONE PO, Take 300 mg by mouth Daily., Disp: , Rfl:     Testosterone Cypionate (DEPOTESTOTERONE CYPIONATE) 200 MG/ML injection, Inject 1 mL into the appropriate muscle as directed by prescriber 1 (One) Time Per Week. Pt unsure of dosage once per week, Disp: , Rfl:     Thyroid (ARMOUR THYROID) 60 MG PO tablet, Take 1 tablet by mouth Daily., Disp: 90 tablet, Rfl: 2    vitamin D3 125 MCG (5000 UT) capsule capsule, Take 2 capsules every day by oral route at dinner for 90 days., Disp: , Rfl:     fluconazole (DIFLUCAN) 150 MG tablet, Take 1 tablet by mouth Daily As Needed. (Patient not taking: Reported on 2/13/2025), Disp: 15 tablet, Rfl: 3    nitrofurantoin, macrocrystal-monohydrate, (MACROBID) 100 MG capsule, Take 1 capsule by mouth As Needed for recurrent UTI (Patient not taking: Reported on 2/13/2025), Disp: 24 capsule, Rfl: 0    spironolactone (ALDACTONE) 100 MG tablet, Take 1 tablet by mouth Daily. (Patient not taking: Reported on 2/13/2025), Disp: 90 tablet, Rfl: 1        ALLERGIES:   Allergies        Allergies   Allergen Reactions    Statins Myalgia            PHYSICAL EXAM:   Constitutional: Well-developed well-nourished, no acute distress  Vital signs:   Height 66.5\"  Weight 179  BMI 28.5  Neck: Supple, no palpable mass, trachea midline  Respiratory: Clear to auscultation, normal inspiratory effort  Cardiovascular: Regular rate, no murmur, no carotid bruit  Gastrointestinal: Soft, nontender  Psychiatric: Alert and oriented ×3, normal affect   BMI is >= 25 and <30. (Overweight) The following options were offered after discussion;: weight loss educational material (shared in after visit summary)           Jamie Corea PA-C     [facility] - General Surgery  [address] [place] [address], [address]  [place] [address]    [address], [address]  [place] [address]    Office: [phone]  Fax: [phone]

## 2025-04-11 NOTE — ANESTHESIA POSTPROCEDURE EVALUATION
Patient: Echo Montelongo    Procedure Summary       Date: 04/11/25 Room / Location: [facility] ENDOSCOPY 2 /  [facility] OR    Anesthesia Start: 0937 Anesthesia Stop: 1002    Procedure: COLONOSCOPY Diagnosis:       Calculus of gallbladder with chronic cholecystitis without obstruction      Screen for colon cancer      (Calculus of gallbladder with chronic cholecystitis without obstruction [K80.10])      (Screen for colon cancer [Z12.11])    Surgeons: Jake Woodard MD Provider: Divya Hernández CRNA    Anesthesia Type: MAC ASA Status: 2            Anesthesia Type: MAC    Vitals  Vitals Value Taken Time   /60 04/11/25 10:10   Temp 97.8 °F (36.6 °C) 04/11/25 10:10   Pulse 73 04/11/25 10:21   Resp 14 04/11/25 10:10   SpO2 95 % 04/11/25 10:21   Vitals shown include unfiled device data.        Post Anesthesia Care and Evaluation    Patient location during evaluation: PHASE II  Patient participation: complete - patient participated  Level of consciousness: awake and alert  Pain score: 0  Pain management: adequate    Airway patency: patent  Anesthetic complications: No anesthetic complications  PONV Status: none  Cardiovascular status: acceptable  Respiratory status: acceptable  Hydration status: acceptable

## 2025-04-11 NOTE — ANESTHESIA PREPROCEDURE EVALUATION
Anesthesia Evaluation     Patient summary reviewed   history of anesthetic complications:  PONV  NPO Solid Status: > 8 hours  NPO Liquid Status: > 2 hours           Airway   Mallampati: II  TM distance: >3 FB  Neck ROM: full  No difficulty expected  Dental - normal exam     Comment: Caps and bonding (secure)    Pulmonary - negative pulmonary ROS    breath sounds clear to auscultation  (-) shortness of breath, recent URI, not a smoker  Cardiovascular   Exercise tolerance: good (4-7 METS)    ECG reviewed  Rhythm: regular  Rate: normal    (+) hyperlipidemia  (-) past MI, dysrhythmias, angina      Neuro/Psych- negative ROS  (-) seizures, CVA  GI/Hepatic/Renal/Endo    (+) GERD poorly controlled, renal disease (h/o)- stones, thyroid problem hypothyroidism  (-)  obesity, diabetes    Musculoskeletal (-) negative ROS    (-) neck stiffness  Abdominal    Substance History   (+) alcohol use (5 drinks/ month)     OB/GYN          Other   arthritis,     ROS/Med Hx Other: Sip of water with meds 0700                    Anesthesia Plan    ASA 2     MAC     intravenous induction     Anesthetic plan, risks, benefits, and alternatives have been provided, discussed and informed consent has been obtained with: patient.  Pre-procedure education provided  Use of blood products discussed with patient  Consented to blood products.    Plan discussed with CRNA.        CODE STATUS:

## 2025-04-11 NOTE — OP NOTE
PREOPERATIVE DIAGNOSIS:  Screening    POSTOPERATIVE DIAGNOSIS AND FINDINGS:  Normal    PROCEDURE:  Colonoscopy to cecum     SURGEON:  Jake Woodard MD    ANESTHESIA:  MAC    SPECIMEN(S):  none    DESCRIPTION:  In decubitus position digital rectal exam was normal. Colonoscope inserted under direct visualization of lumen to cecum confirmed by visualization of ileocecal valve and appendiceal orifice.  Scope was slowly withdrawn circumferentially examining all mucosal surfaces.  Bowel preparation was good.  There were no mucosal abnormalities.  There was no diverticulosis.  Tolerated well.    RECOMMENDATION FOR FUTURE SURVEILLANCE:  10 years    Jake Woodard M.D.

## 2025-04-14 ENCOUNTER — APPOINTMENT (OUTPATIENT)
Dept: GENERAL RADIOLOGY | Facility: HOSPITAL | Age: 60
End: 2025-04-14
Payer: COMMERCIAL

## 2025-04-14 ENCOUNTER — HOSPITAL ENCOUNTER (OUTPATIENT)
Facility: HOSPITAL | Age: 60
Setting detail: HOSPITAL OUTPATIENT SURGERY
Discharge: HOME | End: 2025-04-14
Attending: SURGERY | Admitting: SURGERY
Payer: COMMERCIAL

## 2025-04-14 ENCOUNTER — ANESTHESIA (OUTPATIENT)
Dept: PERIOP | Facility: HOSPITAL | Age: 60
End: 2025-04-14
Payer: COMMERCIAL

## 2025-04-14 VITALS
RESPIRATION RATE: 16 BRPM | HEART RATE: 71 BPM | BODY MASS INDEX: 28.51 KG/M2 | SYSTOLIC BLOOD PRESSURE: 103 MMHG | TEMPERATURE: 97.8 F | WEIGHT: 182 LBS | DIASTOLIC BLOOD PRESSURE: 60 MMHG | OXYGEN SATURATION: 98 %

## 2025-04-14 DIAGNOSIS — K80.10 CALCULUS OF GALLBLADDER WITH CHRONIC CHOLECYSTITIS WITHOUT OBSTRUCTION: ICD-10-CM

## 2025-04-14 PROCEDURE — 25010000002 SUCCINYLCHOLINE PER 20 MG: Performed by: NURSE ANESTHETIST, CERTIFIED REGISTERED

## 2025-04-14 PROCEDURE — 47563 LAPARO CHOLECYSTECTOMY/GRAPH: CPT | Performed by: SURGERY

## 2025-04-14 PROCEDURE — 25010000002 ONDANSETRON PER 1 MG: Performed by: ANESTHESIOLOGY

## 2025-04-14 PROCEDURE — 88304 TISSUE EXAM BY PATHOLOGIST: CPT | Performed by: SURGERY

## 2025-04-14 PROCEDURE — 25010000002 DEXAMETHASONE PER 1 MG: Performed by: ANESTHESIOLOGY

## 2025-04-14 PROCEDURE — 25010000002 FAMOTIDINE 10 MG/ML SOLUTION: Performed by: ANESTHESIOLOGY

## 2025-04-14 PROCEDURE — 74300 X-RAY BILE DUCTS/PANCREAS: CPT

## 2025-04-14 PROCEDURE — 25010000002 PROPOFOL 200 MG/20ML EMULSION: Performed by: NURSE ANESTHETIST, CERTIFIED REGISTERED

## 2025-04-14 PROCEDURE — 25010000002 SUGAMMADEX 200 MG/2ML SOLUTION: Performed by: NURSE ANESTHETIST, CERTIFIED REGISTERED

## 2025-04-14 PROCEDURE — 25010000002 HYDROMORPHONE 1 MG/ML SOLUTION: Performed by: NURSE ANESTHETIST, CERTIFIED REGISTERED

## 2025-04-14 PROCEDURE — 25510000001 IOPAMIDOL 61 % SOLUTION: Performed by: SURGERY

## 2025-04-14 PROCEDURE — 25810000003 LACTATED RINGERS PER 1000 ML: Performed by: ANESTHESIOLOGY

## 2025-04-14 PROCEDURE — C1894 INTRO/SHEATH, NON-LASER: HCPCS | Performed by: SURGERY

## 2025-04-14 PROCEDURE — 25010000002 KETOROLAC TROMETHAMINE PER 15 MG: Performed by: NURSE ANESTHETIST, CERTIFIED REGISTERED

## 2025-04-14 PROCEDURE — 25010000002 FENTANYL CITRATE (PF) 50 MCG/ML SOLUTION: Performed by: NURSE ANESTHETIST, CERTIFIED REGISTERED

## 2025-04-14 PROCEDURE — 47563 LAPARO CHOLECYSTECTOMY/GRAPH: CPT | Performed by: SPECIALIST/TECHNOLOGIST, OTHER

## 2025-04-14 PROCEDURE — 25810000003 LACTATED RINGERS PER 1000 ML: Performed by: NURSE ANESTHETIST, CERTIFIED REGISTERED

## 2025-04-14 DEVICE — HORIZON TI ML 6 CLIPS/CART
Type: IMPLANTABLE DEVICE | Site: ABDOMEN | Status: FUNCTIONAL
Brand: WECK

## 2025-04-14 RX ORDER — DEXAMETHASONE SODIUM PHOSPHATE 4 MG/ML
8 INJECTION, SOLUTION INTRA-ARTICULAR; INTRALESIONAL; INTRAMUSCULAR; INTRAVENOUS; SOFT TISSUE ONCE AS NEEDED
Status: COMPLETED | OUTPATIENT
Start: 2025-04-14 | End: 2025-04-14

## 2025-04-14 RX ORDER — OXYCODONE AND ACETAMINOPHEN 5; 325 MG/1; MG/1
1 TABLET ORAL ONCE AS NEEDED
Status: DISCONTINUED | OUTPATIENT
Start: 2025-04-14 | End: 2025-04-14 | Stop reason: HOSPADM

## 2025-04-14 RX ORDER — SODIUM CHLORIDE, SODIUM LACTATE, POTASSIUM CHLORIDE, CALCIUM CHLORIDE 600; 310; 30; 20 MG/100ML; MG/100ML; MG/100ML; MG/100ML
100 INJECTION, SOLUTION INTRAVENOUS CONTINUOUS
Status: DISCONTINUED | OUTPATIENT
Start: 2025-04-14 | End: 2025-04-14 | Stop reason: HOSPADM

## 2025-04-14 RX ORDER — KETAMINE HYDROCHLORIDE 10 MG/ML
INJECTION, SOLUTION INTRAMUSCULAR; INTRAVENOUS AS NEEDED
Status: DISCONTINUED | OUTPATIENT
Start: 2025-04-14 | End: 2025-04-14 | Stop reason: SURG

## 2025-04-14 RX ORDER — LIDOCAINE HYDROCHLORIDE 10 MG/ML
0.5 INJECTION, SOLUTION EPIDURAL; INFILTRATION; INTRACAUDAL; PERINEURAL ONCE AS NEEDED
Status: DISCONTINUED | OUTPATIENT
Start: 2025-04-14 | End: 2025-04-14 | Stop reason: HOSPADM

## 2025-04-14 RX ORDER — SODIUM CHLORIDE 9 MG/ML
40 INJECTION, SOLUTION INTRAVENOUS AS NEEDED
Status: DISCONTINUED | OUTPATIENT
Start: 2025-04-14 | End: 2025-04-14 | Stop reason: HOSPADM

## 2025-04-14 RX ORDER — KETOROLAC TROMETHAMINE 30 MG/ML
INJECTION, SOLUTION INTRAMUSCULAR; INTRAVENOUS AS NEEDED
Status: DISCONTINUED | OUTPATIENT
Start: 2025-04-14 | End: 2025-04-14 | Stop reason: SURG

## 2025-04-14 RX ORDER — FAMOTIDINE 10 MG/ML
20 INJECTION, SOLUTION INTRAVENOUS
Status: COMPLETED | OUTPATIENT
Start: 2025-04-14 | End: 2025-04-14

## 2025-04-14 RX ORDER — ONDANSETRON 2 MG/ML
4 INJECTION INTRAMUSCULAR; INTRAVENOUS ONCE AS NEEDED
Status: COMPLETED | OUTPATIENT
Start: 2025-04-14 | End: 2025-04-14

## 2025-04-14 RX ORDER — MAGNESIUM HYDROXIDE 1200 MG/15ML
LIQUID ORAL AS NEEDED
Status: DISCONTINUED | OUTPATIENT
Start: 2025-04-14 | End: 2025-04-14 | Stop reason: HOSPADM

## 2025-04-14 RX ORDER — IOPAMIDOL 612 MG/ML
INJECTION, SOLUTION INTRAVASCULAR AS NEEDED
Status: DISCONTINUED | OUTPATIENT
Start: 2025-04-14 | End: 2025-04-14 | Stop reason: HOSPADM

## 2025-04-14 RX ORDER — FENTANYL CITRATE 50 UG/ML
INJECTION, SOLUTION INTRAMUSCULAR; INTRAVENOUS AS NEEDED
Status: DISCONTINUED | OUTPATIENT
Start: 2025-04-14 | End: 2025-04-14 | Stop reason: SURG

## 2025-04-14 RX ORDER — PROPOFOL 10 MG/ML
INJECTION, EMULSION INTRAVENOUS AS NEEDED
Status: DISCONTINUED | OUTPATIENT
Start: 2025-04-14 | End: 2025-04-14 | Stop reason: SURG

## 2025-04-14 RX ORDER — SODIUM CHLORIDE 0.9 % (FLUSH) 0.9 %
10 SYRINGE (ML) INJECTION AS NEEDED
Status: DISCONTINUED | OUTPATIENT
Start: 2025-04-14 | End: 2025-04-14 | Stop reason: HOSPADM

## 2025-04-14 RX ORDER — SCOPOLAMINE 1 MG/3D
1 PATCH, EXTENDED RELEASE TRANSDERMAL
Status: DISCONTINUED | OUTPATIENT
Start: 2025-04-14 | End: 2025-04-14 | Stop reason: HOSPADM

## 2025-04-14 RX ORDER — SODIUM CHLORIDE, SODIUM LACTATE, POTASSIUM CHLORIDE, CALCIUM CHLORIDE 600; 310; 30; 20 MG/100ML; MG/100ML; MG/100ML; MG/100ML
9 INJECTION, SOLUTION INTRAVENOUS CONTINUOUS
Status: DISCONTINUED | OUTPATIENT
Start: 2025-04-14 | End: 2025-04-14 | Stop reason: HOSPADM

## 2025-04-14 RX ORDER — ROCURONIUM BROMIDE 10 MG/ML
INJECTION, SOLUTION INTRAVENOUS AS NEEDED
Status: DISCONTINUED | OUTPATIENT
Start: 2025-04-14 | End: 2025-04-14 | Stop reason: SURG

## 2025-04-14 RX ORDER — DEXMEDETOMIDINE HYDROCHLORIDE 100 UG/ML
INJECTION, SOLUTION INTRAVENOUS AS NEEDED
Status: DISCONTINUED | OUTPATIENT
Start: 2025-04-14 | End: 2025-04-14 | Stop reason: SURG

## 2025-04-14 RX ORDER — BUPIVACAINE HYDROCHLORIDE AND EPINEPHRINE 5; 5 MG/ML; UG/ML
INJECTION, SOLUTION EPIDURAL; INTRACAUDAL; PERINEURAL AS NEEDED
Status: DISCONTINUED | OUTPATIENT
Start: 2025-04-14 | End: 2025-04-14 | Stop reason: HOSPADM

## 2025-04-14 RX ORDER — ONDANSETRON 4 MG/1
4 TABLET, FILM COATED ORAL EVERY 6 HOURS PRN
Qty: 10 TABLET | Refills: 0 | Status: SHIPPED | OUTPATIENT
Start: 2025-04-14 | End: 2025-04-24

## 2025-04-14 RX ORDER — SUCCINYLCHOLINE CHLORIDE 20 MG/ML
INJECTION INTRAMUSCULAR; INTRAVENOUS AS NEEDED
Status: DISCONTINUED | OUTPATIENT
Start: 2025-04-14 | End: 2025-04-14 | Stop reason: SURG

## 2025-04-14 RX ORDER — SODIUM CHLORIDE 0.9 % (FLUSH) 0.9 %
10 SYRINGE (ML) INJECTION EVERY 12 HOURS SCHEDULED
Status: DISCONTINUED | OUTPATIENT
Start: 2025-04-14 | End: 2025-04-14 | Stop reason: HOSPADM

## 2025-04-14 RX ORDER — MIDAZOLAM HYDROCHLORIDE 2 MG/2ML
1 INJECTION, SOLUTION INTRAMUSCULAR; INTRAVENOUS
Status: DISCONTINUED | OUTPATIENT
Start: 2025-04-14 | End: 2025-04-14 | Stop reason: HOSPADM

## 2025-04-14 RX ORDER — HYDROCODONE BITARTRATE AND ACETAMINOPHEN 5; 325 MG/1; MG/1
1 TABLET ORAL EVERY 4 HOURS PRN
Qty: 10 TABLET | Refills: 0 | Status: SHIPPED | OUTPATIENT
Start: 2025-04-14 | End: 2025-04-24

## 2025-04-14 RX ADMIN — SODIUM CHLORIDE, POTASSIUM CHLORIDE, SODIUM LACTATE AND CALCIUM CHLORIDE 9 ML/HR: 600; 310; 30; 20 INJECTION, SOLUTION INTRAVENOUS at 08:02

## 2025-04-14 RX ADMIN — HYDROMORPHONE HYDROCHLORIDE 0.5 MG: 1 INJECTION, SOLUTION INTRAMUSCULAR; INTRAVENOUS; SUBCUTANEOUS at 09:42

## 2025-04-14 RX ADMIN — SUCCINYLCHOLINE CHLORIDE 100 MG: 20 INJECTION, SOLUTION INTRAMUSCULAR; INTRAVENOUS at 09:04

## 2025-04-14 RX ADMIN — FAMOTIDINE 20 MG: 10 INJECTION INTRAVENOUS at 08:02

## 2025-04-14 RX ADMIN — ONDANSETRON 4 MG: 2 INJECTION INTRAMUSCULAR; INTRAVENOUS at 08:02

## 2025-04-14 RX ADMIN — SCOPOLAMINE 1 PATCH: 1.5 PATCH, EXTENDED RELEASE TRANSDERMAL at 08:06

## 2025-04-14 RX ADMIN — FENTANYL CITRATE 50 MCG: 50 INJECTION, SOLUTION INTRAMUSCULAR; INTRAVENOUS at 09:04

## 2025-04-14 RX ADMIN — SODIUM CHLORIDE, POTASSIUM CHLORIDE, SODIUM LACTATE AND CALCIUM CHLORIDE 100 ML/HR: 600; 310; 30; 20 INJECTION, SOLUTION INTRAVENOUS at 09:56

## 2025-04-14 RX ADMIN — KETAMINE HYDROCHLORIDE 20 MG: 10 INJECTION, SOLUTION INTRAMUSCULAR; INTRAVENOUS at 09:16

## 2025-04-14 RX ADMIN — KETAMINE HYDROCHLORIDE 20 MG: 10 INJECTION, SOLUTION INTRAMUSCULAR; INTRAVENOUS at 09:10

## 2025-04-14 RX ADMIN — DEXMEDETOMIDINE HYDROCHLORIDE 10 MCG: 100 INJECTION, SOLUTION INTRAVENOUS at 09:10

## 2025-04-14 RX ADMIN — KETOROLAC TROMETHAMINE 30 MG: 30 INJECTION, SOLUTION INTRAMUSCULAR; INTRAVENOUS at 09:34

## 2025-04-14 RX ADMIN — HYDROMORPHONE HYDROCHLORIDE 0.5 MG: 1 INJECTION, SOLUTION INTRAMUSCULAR; INTRAVENOUS; SUBCUTANEOUS at 09:45

## 2025-04-14 RX ADMIN — PROPOFOL 200 MG: 10 INJECTION, EMULSION INTRAVENOUS at 09:04

## 2025-04-14 RX ADMIN — DEXMEDETOMIDINE HYDROCHLORIDE 10 MCG: 100 INJECTION, SOLUTION INTRAVENOUS at 09:20

## 2025-04-14 RX ADMIN — PROPOFOL 200 MCG/KG/MIN: 10 INJECTION, EMULSION INTRAVENOUS at 09:07

## 2025-04-14 RX ADMIN — DEXAMETHASONE SODIUM PHOSPHATE 8 MG: 4 INJECTION INTRA-ARTICULAR; INTRALESIONAL; INTRAMUSCULAR; INTRAVENOUS; SOFT TISSUE at 08:02

## 2025-04-14 RX ADMIN — SUGAMMADEX 200 MG: 100 INJECTION, SOLUTION INTRAVENOUS at 09:38

## 2025-04-14 RX ADMIN — ROCURONIUM 30 MG: 50 INJECTION, SOLUTION INTRAVENOUS at 09:10

## 2025-04-14 NOTE — ANESTHESIA PROCEDURE NOTES
Airway  Reason: elective    Date/Time: 4/14/2025 9:05 AM  Airway not difficult    General Information and Staff    Patient location during procedure: OR  CRNA/CAA: Marcelo King CRNA    Indications and Patient Condition  Indications for airway management: airway protection    Preoxygenated: yes    Mask difficulty assessment: 0 - not attempted    Final Airway Details    Final airway type: endotracheal airway      Successful airway: ETT  Cuffed: yes   Successful intubation technique: direct laryngoscopy  Endotracheal tube insertion site: oral  Blade: Moreno  Blade size: 2  ETT size (mm): 7.0  Cormack-Lehane Classification: grade I - full view of glottis  Placement verified by: chest auscultation and capnometry   Measured from: lips  ETT/EBT  to lips (cm): 21  Number of attempts at approach: 1  Assessment: lips, teeth, and gum same as pre-op and atraumatic intubation

## 2025-04-14 NOTE — OP NOTE
PREOPERATIVE DIAGNOSIS:  Symptomatic cholelithiasis    POSTOPERATIVE DIAGNOSIS (FINDINGS):  Same    PROCEDURE:  Laparoscopic cholecystectomy with cholangiogram    SURGEON:  Jake Woodard MD    ASSISTANT:  Franko Oliver was responsible for performing the following activities: suction, irrigation, suturing, closing, retraction, camera holding, and placing dressing, and their skilled assistance was necessary for the success of this case.    ANESTHESIA:  General    EBL:  Minimal    SPECIMEN(S):  Gallbladder    DESCRIPTION:  Supine position. General anesthesia. Prepped and draped, usual sterile manner.    0.5% Marcaine with epinephrine infiltrated in all incision sites. Small periumbilical incision made, Veress needle inserted with upward traction on abdominal wall. Abdomen insufflated to 15 mm Hg pressure and 5 mm Optiview trocar inserted followed by 10 mm subxiphoid and 5 mm right subcostal trocars.    Gallbladder grasped and elevated. Cystic duct dissected and clipped once distally. Cholangiogram catheter inserted and cholangiogram demonstrated prompt filling of duodenum, no filling defects. Cystic duct clipped twice proximally and divided. Cystic artery clipped twice proximally, once distally and divided. Gallbladder removed from the liver bed with electrocautery, placed in an Endo Catch bag, and removed through subxiphoid trocar site. Liver bed copiously irrigated and aspirated with good hemostasis noted. CO2 released, fascia of subxiphoid trocar site closed with 0-Vicryl, skin edges 5-0 Vicryl subcuticular suture.  Exofin applied.    Tolerated well.  Stable to PACU.    Jake Woodard M.D.

## 2025-04-14 NOTE — ANESTHESIA POSTPROCEDURE EVALUATION
Patient: Echo Montelongo    Procedure Summary       Date: 04/14/25 Room / Location:  [facility] OR 2 /  [facility]    Anesthesia Start: 0900 Anesthesia Stop: 0955    Procedure: Laparoscopic cholecystectomy with cholangiogram (Abdomen) Diagnosis:       Calculus of gallbladder with chronic cholecystitis without obstruction      (Calculus of gallbladder with chronic cholecystitis without obstruction [K80.10])    Surgeons: Jake Woodard MD Provider: Marcelo King CRNA    Anesthesia Type: general ASA Status: 2            Anesthesia Type: general    Vitals  Vitals Value Taken Time   /60 04/14/25 11:16   Temp 97.8 °F (36.6 °C) 04/14/25 09:55   Pulse 71 04/14/25 11:16   Resp 16 04/14/25 10:40   SpO2 98 % 04/14/25 11:14   Vitals shown include unfiled device data.        Post Anesthesia Care and Evaluation    Patient location during evaluation: PHASE II  Patient participation: complete - patient participated  Level of consciousness: awake and alert  Pain score: 2  Pain management: adequate    Airway patency: patent  Anesthetic complications: No anesthetic complications  PONV Status: none  Cardiovascular status: acceptable  Respiratory status: acceptable  Hydration status: acceptable

## 2025-04-14 NOTE — ANESTHESIA PREPROCEDURE EVALUATION
Anesthesia Evaluation     Patient summary reviewed and Nursing notes reviewed   history of anesthetic complications:  PONV  NPO Solid Status: > 8 hours  NPO Liquid Status: > 8 hours           Airway   Mallampati: II  TM distance: >3 FB  Neck ROM: full  No difficulty expected  Dental - normal exam     Comment: Caps and bonding (secure)    Pulmonary - negative pulmonary ROS    breath sounds clear to auscultation  (-) shortness of breath, recent URI, not a smoker  Cardiovascular   Exercise tolerance: good (4-7 METS)    ECG reviewed  Rhythm: regular  Rate: normal    (+) hyperlipidemia  (-) past MI, dysrhythmias, angina      Neuro/Psych- negative ROS  (-) seizures, CVA  GI/Hepatic/Renal/Endo    (+) GERD poorly controlled, renal disease (h/o)- stones, thyroid problem hypothyroidism  (-)  obesity, diabetes    Musculoskeletal (-) negative ROS    (-) neck stiffness  Abdominal    Substance History   (+) alcohol use (5 drinks/ month)     OB/GYN          Other                          Anesthesia Plan    ASA 2     general   total IV anesthesia  intravenous induction     Anesthetic plan, risks, benefits, and alternatives have been provided, discussed and informed consent has been obtained with: patient.  Pre-procedure education provided  Use of blood products discussed with patient  Consented to blood products.    Plan discussed with CRNA.        CODE STATUS:

## 2025-04-21 LAB
CYTO UR: NORMAL
LAB AP CASE REPORT: NORMAL
PATH REPORT.FINAL DX SPEC: NORMAL
PATH REPORT.GROSS SPEC: NORMAL

## 2025-04-24 ENCOUNTER — OFFICE VISIT (OUTPATIENT)
Dept: SURGERY | Facility: CLINIC | Age: 60
End: 2025-04-24
Payer: COMMERCIAL

## 2025-04-24 VITALS
DIASTOLIC BLOOD PRESSURE: 62 MMHG | BODY MASS INDEX: 28.56 KG/M2 | SYSTOLIC BLOOD PRESSURE: 110 MMHG | OXYGEN SATURATION: 97 % | WEIGHT: 182 LBS | HEART RATE: 76 BPM | HEIGHT: 67 IN

## 2025-04-24 DIAGNOSIS — Z09 ENCOUNTER FOR FOLLOW-UP: Primary | ICD-10-CM

## 2025-04-24 PROCEDURE — 99024 POSTOP FOLLOW-UP VISIT: CPT | Performed by: PHYSICIAN ASSISTANT

## 2025-04-24 RX ORDER — METRONIDAZOLE TOPICAL 7.5 MG/G
1 GEL TOPICAL 2 TIMES DAILY
Qty: 45 G | Refills: 1 | Status: SHIPPED | OUTPATIENT
Start: 2025-04-24

## 2025-04-24 NOTE — LETTER
April 24, 2025     Patient: Echo Montelongo   YOB: 1965   Date of Visit: 4/24/2025       To Whom It May Concern:    It is my medical opinion that Echo Montelongo may return to work on 4/29/25 without restrictions .           Sincerely,        Jake Woodard MD    CC: No Recipients

## 2025-04-24 NOTE — LETTER
April 24, 2025     Patient: Echo Montelongo   YOB: 1965   Date of Visit: 4/24/2025       To Whom It May Concern:    It is my medical opinion that Echo Montelongo {Work release (duty restriction):[id number]}.           Sincerely,        Jake Woodard MD    CC: No Recipients

## 2025-04-25 NOTE — PROGRESS NOTES
This is a 59-year-old lady returning to the office today status post laparoscopic cholecystectomy with intraoperative cholangiogram that was performed on 4/14/2025.  Additionally she underwent a routine screening colonoscopy on 4/11/2025.  The colonoscopy was normal with no abnormalities and a 10-year recommendation for follow-up.  Her gallbladder surgery, the pathology was reviewed which indicated that she had chronic cholecystitis with cholelithiasis measuring 3.1 x 2.3 x 1.7 cm and adenomatous hyperplasia.  Overall she is doing very well since surgery with minimal discomfort.  She is having normal bowel movements now and tolerating a normal diet.  Her incisions are all healing very well.  She was cautioned to return to all activities as tolerated using her body as her guide.  All questions were answered and she was willing to proceed with all recommendations.    Jamie Corea PA-C

## 2025-05-18 DIAGNOSIS — R11.0 NAUSEA: ICD-10-CM

## 2025-05-18 DIAGNOSIS — E78.2 MIXED HYPERLIPIDEMIA: ICD-10-CM

## 2025-05-19 RX ORDER — ONDANSETRON 4 MG/1
4 TABLET, ORALLY DISINTEGRATING ORAL EVERY 8 HOURS PRN
Qty: 30 TABLET | Refills: 0 | Status: SHIPPED | OUTPATIENT
Start: 2025-05-19

## 2025-05-19 RX ORDER — EZETIMIBE 10 MG/1
10 TABLET ORAL DAILY
Qty: 90 TABLET | Refills: 1 | Status: SHIPPED | OUTPATIENT
Start: 2025-05-19

## 2025-06-02 RX ORDER — FLUCONAZOLE 150 MG/1
150 TABLET ORAL DAILY
Qty: 3 TABLET | Refills: 0 | Status: SHIPPED | OUTPATIENT
Start: 2025-06-02 | End: 2025-06-05

## 2025-06-02 RX ORDER — NITROFURANTOIN 25; 75 MG/1; MG/1
CAPSULE ORAL
Qty: 24 CAPSULE | Refills: 1 | Status: SHIPPED | OUTPATIENT
Start: 2025-06-02

## 2025-06-02 NOTE — TELEPHONE ENCOUNTER
Pt called requesting refills for macrobid and diflucan.  She states she usually has these prescriptions available to her as needed.  [facility].

## 2025-06-02 NOTE — TELEPHONE ENCOUNTER
Macrobid 100 mg #24, Take 1 cap by mouth as needed for recurrent uti prevention. Last filled by you on 9/30/24.

## 2025-06-19 RX ORDER — METRONIDAZOLE TOPICAL 7.5 MG/G
1 GEL TOPICAL 2 TIMES DAILY
Qty: 45 G | Refills: 1 | Status: SHIPPED | OUTPATIENT
Start: 2025-06-19

## 2025-07-18 ENCOUNTER — TELEPHONE (OUTPATIENT)
Dept: OBSTETRICS AND GYNECOLOGY | Age: 60
End: 2025-07-18
Payer: COMMERCIAL

## 2025-07-18 ENCOUNTER — OFFICE VISIT (OUTPATIENT)
Dept: INTERNAL MEDICINE | Facility: CLINIC | Age: 60
End: 2025-07-18
Payer: COMMERCIAL

## 2025-07-18 VITALS
DIASTOLIC BLOOD PRESSURE: 80 MMHG | BODY MASS INDEX: 26.84 KG/M2 | SYSTOLIC BLOOD PRESSURE: 110 MMHG | HEIGHT: 67 IN | OXYGEN SATURATION: 97 % | HEART RATE: 86 BPM | WEIGHT: 171 LBS

## 2025-07-18 DIAGNOSIS — E78.2 MIXED HYPERLIPIDEMIA: Primary | ICD-10-CM

## 2025-07-18 DIAGNOSIS — N39.0 RECURRENT UTI: ICD-10-CM

## 2025-07-18 RX ORDER — METHENAMINE HIPPURATE 1000 MG/1
1 TABLET ORAL 2 TIMES DAILY WITH MEALS
Qty: 60 TABLET | Refills: 0 | Status: SHIPPED | OUTPATIENT
Start: 2025-07-18

## 2025-07-18 NOTE — PROGRESS NOTES
"Chief Complaint  recurrent uti and lab follow up    Subjective        Echo Montelongo presents to [facility]  History of Present Illness    Presents to clinic today to discuss labs obtained by her outside physician who prescribes HRT, which was extensive     She was noted to have slightly elevated apoB at 92, Lp(a) undetectable, LDL of 113. She would like to be as proactive as possible about preventing ASCVD    She also reports recurrent UTIs for some time now, she is on vaginal estrogen as well as has macrobid to be used prn by her OBGYN but it doesn't seem to be working with urinary frequency      Objective   Vital Signs:  /80   Pulse 86   Ht 170.2 cm (67.01\")   Wt 77.6 kg (171 lb)   SpO2 97%   BMI 26.78 kg/m²   Estimated body mass index is 26.78 kg/m² as calculated from the following:    Height as of this encounter: 170.2 cm (67.01\").    Weight as of this encounter: 77.6 kg (171 lb).            Physical Exam  Vitals reviewed.   Constitutional:       General: She is not in acute distress.     Appearance: Normal appearance. She is not toxic-appearing.   HENT:      Head: Normocephalic and atraumatic.   Eyes:      General: No scleral icterus.     Conjunctiva/sclera: Conjunctivae normal.   Skin:     General: Skin is warm and dry.   Neurological:      Mental Status: She is alert and oriented to person, place, and time.   Psychiatric:         Mood and Affect: Mood normal.         Behavior: Behavior normal.        Result Review :                Assessment and Plan   Diagnoses and all orders for this visit:    1. Mixed hyperlipidemia (Primary)    2. Recurrent UTI  -     methenamine (HIPREX) 1 g tablet; Take 1 tablet by mouth 2 (Two) Times a Day With Meals.  Dispense: 60 tablet; Refill: 0      Reviewed outside labs patient brought in, labs obtained through her functional medicine physician. Provided reassurance, her labs all largely looked great and significance of apoB and other " biomarkers. Ultimately with her LDL being at 113 and no other ASCVD risk factors + 0 CACS, do not need to worry    For recurrent UTIs, we will try hiprex to see if provides any benefit to current vaginal estrogen, macrobid prn. I did recommend we consider treating for full duration of complicated cystitis for 7-14 days with alternative UTI but she will take macrobid for full 7d course. She will also discuss with OBGYN at upcoming OV    She just retired and is getting  before end of year, so she is figuring out her insurance plan and we can follow up after this is figured out         Follow Up   No follow-ups on file.  Patient was given instructions and counseling regarding her condition or for health maintenance advice. Please see specific information pulled into the AVS if appropriate.

## 2025-07-18 NOTE — TELEPHONE ENCOUNTER
Patient calling, she state's she is having trouble with recurrent UTI's. She has had a hard time having to take a bunch of antibiotic's. The patient would like to know if she is a good candidate to have a Labioplasty done? She cannot get in to see you until August 26th. Patient would like to know your recommendation's.

## 2025-07-21 ENCOUNTER — TELEPHONE (OUTPATIENT)
Dept: OBSTETRICS AND GYNECOLOGY | Age: 60
End: 2025-07-21
Payer: COMMERCIAL

## 2025-07-22 ENCOUNTER — OFFICE VISIT (OUTPATIENT)
Dept: OBSTETRICS AND GYNECOLOGY | Age: 60
End: 2025-07-22
Payer: COMMERCIAL

## 2025-07-22 VITALS
DIASTOLIC BLOOD PRESSURE: 70 MMHG | HEIGHT: 67 IN | WEIGHT: 171 LBS | SYSTOLIC BLOOD PRESSURE: 118 MMHG | BODY MASS INDEX: 26.84 KG/M2

## 2025-07-22 DIAGNOSIS — N39.3 SUI (STRESS URINARY INCONTINENCE, FEMALE): Primary | ICD-10-CM

## 2025-07-22 DIAGNOSIS — N39.0 RECURRENT UTI: ICD-10-CM

## 2025-07-22 PROCEDURE — 99213 OFFICE O/P EST LOW 20 MIN: CPT | Performed by: OBSTETRICS & GYNECOLOGY

## 2025-07-22 RX ORDER — ESTRADIOL 10 UG/1
1 TABLET, FILM COATED VAGINAL
COMMUNITY
Start: 2025-07-15

## 2025-07-22 NOTE — PROGRESS NOTES
Subjective       History of Present Illness  Echo Montelongo is a 59 y.o. female is being seen today for possible labial plasty    Chief Complaint   Patient presents with    Gynecologic Exam     Gyn problem: Recurrent uti's,discuss labioplasty   .        The following portions of the patient's history were reviewed and updated as appropriate: allergies, current medications, past family history, past medical history, past social history, past surgical history and problem list.    PAST MEDICAL HISTORY  Past Medical History:   Diagnosis Date    Abnormal Pap smear of cervix     LGSIL,22,negative hpv    Anemia     Disease of thyroid gland     GERD (gastroesophageal reflux disease)     High cholesterol     BORDERLINE NO MEDICINE    History of reduction of orbital fracture     RIGHT    Hypothyroidism     Injury of back     Insulin resistance     Knee sprain     PONV (postoperative nausea and vomiting)     SEVERE N/V    Right shoulder pain     Screen for colon cancer 2025    Shoulder injury     Sinus congestion     Urinary incontinence      OB History    Para Term  AB Living   3 3 2 1  2   SAB IAB Ectopic Molar Multiple Live Births        2      # Outcome Date GA Lbr Hussain/2nd Weight Sex Type Anes PTL Lv   3   36w0d  3204 g (7 lb 1 oz) M    ANTHONY   2 Term    3771 g (8 lb 5 oz) F CS-LTranv   ANTHONY   1 Term              Past Surgical History:   Procedure Laterality Date    ABDOMINOPLASTY      ANTERIOR AND POSTERIOR VAGINAL REPAIR N/A 2017    Procedure: PERINEOPLASTY AND EXCISION OF PERINEAL LESION;  Surgeon: Pino Ge MD;  Location: [facility] OR [facility] [place];  Service:      SECTION      CHOLECYSTECTOMY WITH INTRAOPERATIVE CHOLANGIOGRAM N/A 2025    Procedure: Laparoscopic cholecystectomy with cholangiogram;  Surgeon: Jake Woodard MD;  Location: [facility] OR;  Service: General;  Laterality: N/A;    COLONOSCOPY N/A 2013    Katie FERMIN    COLONOSCOPY N/A 2025     Procedure: COLONOSCOPY;  Surgeon: Jake Woodard MD;  Location: [facility] OR;  Service: Gastroenterology;  Laterality: N/A;    COLPOSCOPY W/ BIOPSY / CURETTAGE  03/25/2015    benign    ELBOW PROCEDURE      Forearm fracture    ENDOMETRIAL ABLATION  2007    ENDOSCOPY  12/06/2007    ENDOSCOPY N/A 01/04/2023    Procedure: ESOPHAGOGASTRODUODENOSCOPY WITH BIOPSIES AND 15 MM, 16.5 MM, AND 18 MM BALLOON DILATATION.;  Surgeon: Frandy Cisneros MD;  Location: [facility] ENDOSCOPY;  Service: Gastroenterology;  Laterality: N/A;  pre: DYSPHAGIA, GERD  post: HIATAL HERNIA, DUODENITIS, BILE REFLUX, GASTRITIS, ESOPHAGITIS, MILD ESOPHAGEAL STENOSIS    EYE SURGERY      FRACTURE SURGERY      MICROAMBULATORY PHLEBECTOMY Left 11/17/2023    Procedure: LEFT LEG PHLEBECTOMY;  Surgeon: Dina Brown MD;  [place] [facility] [place];  Service: Vascular;  Laterality: Left;    ORBITAL FRACTURE OPEN REDUCTION INTERNAL FIXATION Right 10/18/2016    Procedure: REPAIR ORBITAL FLOOR FRACTURE;  Surgeon: Ernesto Cali MD;  Location: [facility] MAIN OR;  Service:     ORIF WRIST FRACTURE Right 10/18/2016    Procedure: RT ULNA/RADIUS OPEN REDUCTION INTERNAL FIXATION;  Surgeon: Karen Harris MD;  Location: [facility] OR;  Service:     PERINEOPLASTY      SHOULDER ARTHROSCOPY W/ ROTATOR CUFF REPAIR Right 01/25/2024    Procedure: RIGHT SHOULDER ARTHROSCOPY,  LABRAL DEBRIDEMENT, BICEPS TENODESIS,  DECOMPRESSION, AND ROTATOR CUFF REPAIR;  Surgeon: Suresh Monzon MD;  Location: [facility];  Service: Orthopedics;  Laterality: Right;    UPPER GASTROINTESTINAL ENDOSCOPY  06/14/2013    Katie FERMIN    WISDOM TOOTH EXTRACTION  1989     Family History   Problem Relation Age of Onset    Alcohol abuse Mother     Cerebral aneurysm Father     Alcohol abuse Father     Alcohol abuse Maternal Grandmother     Alcohol abuse Paternal Grandmother     Alcohol abuse Paternal Grandfather     Brain cancer Maternal Uncle     Malig Hyperthermia Neg Hx     Breast  cancer Neg Hx      Social History     Tobacco Use   Smoking Status Never    Passive exposure: Never   Smokeless Tobacco Never       Current Outpatient Medications:     B Complex Vitamins (VITAMIN B COMPLEX PO), Take  by mouth., Disp: , Rfl:     estradiol (ESTRACE) 0.1 MG/GM vaginal cream, Insert 2 g into the vagina Daily., Disp: , Rfl:     estradiol (VAGIFEM) 10 MCG tablet vaginal tablet, Insert 1 tablet into the vagina., Disp: , Rfl:     ezetimibe (ZETIA) 10 MG tablet, Take 1 tablet by mouth Daily., Disp: 90 tablet, Rfl: 1    Ferrous Sulfate (IRON PO), Take 1 tablet by mouth Daily., Disp: , Rfl:     Ibuprofen 3 %, Gabapentin 10 %, Baclofen 2 %, lidocaine 4 %, Apply 1-2 g topically to the appropriate area as directed 3 (Three) to 4 (Four) times daily., Disp: 90 g, Rfl: 1    metFORMIN ER (GLUCOPHAGE-XR) 500 MG 24 hr tablet, Take 1 tablet by mouth Every Night., Disp: 90 tablet, Rfl: 1    methenamine (HIPREX) 1 g tablet, Take 1 tablet by mouth 2 (Two) Times a Day With Meals., Disp: 60 tablet, Rfl: 0    metroNIDAZOLE (METROGEL) 0.75 % gel, Apply a thin layer topically to the face as directed 2 (Two) Times a Day. Apply a moisturizer afterwards., Disp: 45 g, Rfl: 1    Multiple Vitamin (MULTI-VITAMIN DAILY PO), Take 1 tablet by mouth Daily As Needed., Disp: , Rfl:     nitrofurantoin, macrocrystal-monohydrate, (MACROBID) 100 MG capsule, TAKE 1 CAPSULE BY MOUTH 1 TIME AS NEEDED FOR RECURRENT UTI PREVENTION, Disp: 24 capsule, Rfl: 1    ondansetron ODT (ZOFRAN-ODT) 4 MG disintegrating tablet, Place 1 tablet on the tongue Every 8 (Eight) Hours As Needed for Nausea or Vomiting., Disp: 30 tablet, Rfl: 0    pantoprazole (PROTONIX) 40 MG EC tablet, Take 1 tablet by mouth Daily., Disp: 90 tablet, Rfl: 2    PROGESTERONE PO, Take 300 mg by mouth Daily., Disp: , Rfl:     Testosterone Cypionate (DEPOTESTOTERONE CYPIONATE) 200 MG/ML injection, Inject 1 mL into the appropriate muscle as directed by prescriber 1 (One) Time Per Week. Pt  unsure of dosage once per week, Disp: , Rfl:     Thyroid (ARMOUR THYROID) 60 MG PO tablet, Take 1 tablet by mouth Daily., Disp: 90 tablet, Rfl: 2    vitamin D3 125 MCG (5000 UT) capsule capsule, Take 2 capsules every day by oral route at dinner for 90 days., Disp: , Rfl:     diphenhydrAMINE (BENADRYL) 25 mg capsule, Take 1 capsule by mouth Every 6 (Six) Hours As Needed for Itching. (Patient not taking: Reported on 7/22/2025), Disp: , Rfl:   Immunization History   Administered Date(s) Administered    31-influenza Vac Quardvalent Preservativ 11/01/2021    COVID-19 (PFIZER) 12YRS+ (COMIRNATY) 10/13/2023, 09/23/2024    COVID-19 (PFIZER) Purple Cap Monovalent 01/03/2021, 01/25/2021, 11/09/2021    Covid-19 (Pfizer) Gray Cap Monovalent 06/10/2022    FluMist 2-49yrs 09/20/2013, 11/01/2015    Flublok 18+yrs 10/13/2023    Fluzone (or Fluarix & Flulaval for VFC) >6mos 10/10/2019    Hepatitis A 06/11/2018, 01/30/2019    Influenza Inj MDCK Preserative Free 09/23/2024    Influenza, Unspecified 11/11/2023    Shingrix 01/27/2023, 04/10/2023    Tdap 01/31/2014       Review of Systems       Except as outlined in history of physical illness, patient denies any changes in her GYN, , GI systems. All other systems reviewed are negative.    Objective   Physical Exam   Alert and oriented, respirations unlabored, heart regular rate and rhythm   Pelvic external genitalia female mons pubis, clitoral contreras, clitoris, labia minora and majora appear normal without any lesions             Vaginal introitus appropriately inserts 2 finger breaths, good sphincter tone and strong perineal body , good estrogenization of vaginal mucosa, minimal relaxation             Cervix uterus adnexa nonenlarged nontender      Assessment & Plan   Diagnoses and all orders for this visit:    1. CRISPIN (stress urinary incontinence, female) (Primary)  -     Ambulatory Referral to Gynecologic Urology    2. Recurrent UTI  -     Ambulatory Referral to Gynecologic  Urology    Patient has ongoing history of intermittent urinary tract infections.  This has caused understandable frustration.  She is seen urology in the past and had essentially a negative evaluation or workup.  Postcoital Macrobid had been suggested.  She utilizes bioidentical compounding hormone creams.  Today's exam shows excellent estrogenization of the vaginal mucosa  She has tried probiotic therapy, cleansing perineum in different ways, postcoital voiding  Most recently was treated with Macrobid for 7 days and prescribed Heprix   Today's exam was reassuring a very detailed examination of labia minora and majora revealed normal anatomy with slightly above average labia minora tissue.  She also has a little expected MARICRUZ urethral folding of the skin tissue around the opening.  I do not think that a labial reduction would be of much benefit and the risk probably outweigh that procedure  We discussed pros and cons of postcoital Macrobid, the utilization of Elizondo Risa cleansing wipes after intercourse and avoiding and how to use those as well  She will give the Heprix a chance to work which was prescribed by her PCP  Tentative referral to urogyn has been made as well  I also discussed that it is important to actually document frequency of urinary tract infections and offered to have her come in and drop off urines for culture and sensitivity rather than just rely on symptoms which can be sometimes confusing. Echo did not want to run another urinalysis as she is finishing up the Macrobid prescribed by her PCP  Overall reassuring clinical exam today.                 Orders Placed This Encounter   Procedures    Ambulatory Referral to Gynecologic Urology     Referral Priority:   Routine     Referral Type:   Consultation     Referral Reason:   Specialty Services Required     Referred to Provider:   Rita Barroso MD     Requested Specialty:   Urogynecology     Number of Visits Requested:   1           EMR Dragon/  Transcription disclaimer:  Much of the encounter note is an electronic transcription/translation of spoken language to printed text. The electronic translation of spoken language may permit erroneous, or at times, nonessential words or phrases to be inadvertently transcribes; Although i have reviewed the note for such errors, some may still exist.

## 2025-08-25 RX ORDER — METFORMIN HYDROCHLORIDE 500 MG/1
500 TABLET, EXTENDED RELEASE ORAL NIGHTLY
Qty: 90 TABLET | Refills: 1 | OUTPATIENT
Start: 2025-08-25

## (undated) DEVICE — IRRIGATOR BULB ASEPTO 60CC STRL

## (undated) DEVICE — SUT VIC 3/0 TIES 18IN J110T

## (undated) DEVICE — APPL DURAPREP IODOPHOR APL 26ML

## (undated) DEVICE — GLV SURG BIOGEL LTX PF 6 1/2

## (undated) DEVICE — SOL IRR H2O BO 1000ML STRL

## (undated) DEVICE — CATH IV INSYTE AUTOGARD 14G 1 1/2IN ORNG

## (undated) DEVICE — ENDOCUT SCISSOR TIP, DISPOSABLE: Brand: RENEW

## (undated) DEVICE — SUT MNCRYL 4/0 PS2 18 IN

## (undated) DEVICE — GLV SURG BIOGEL LTX PF 8

## (undated) DEVICE — LN SMPL CO2 SHTRM SD STREAM W/M LUER

## (undated) DEVICE — HIGH FLOW RATE EXTENSION SET, LUER LOCK ADAPTERS

## (undated) DEVICE — TBG ARTHSCP PT W CONN/REDUC 8FT

## (undated) DEVICE — LAPAROSCOPIC SCOPE WARMER: Brand: DEROYAL

## (undated) DEVICE — FRCP BX RADJAW4 NDL 2.8 240CM LG OG BX40

## (undated) DEVICE — ENDOPOUCH RETRIEVER SPECIMEN RETRIEVAL BAGS: Brand: ENDOPOUCH RETRIEVER

## (undated) DEVICE — DISPOSABLE MONOPOLAR ENDOSCOPIC CORD 10 FT. (3M): Brand: KIRWAN

## (undated) DEVICE — NDL SPINE 22G 31/2IN BLK

## (undated) DEVICE — PK MINOR PROCEDURE 46

## (undated) DEVICE — SYR LUERLOK 5CC

## (undated) DEVICE — TRY SKINPREP SCRB CHG

## (undated) DEVICE — CONTAINER,SPECIMEN,OR STERILE,4OZ: Brand: MEDLINE

## (undated) DEVICE — MSK ENDO PORT O2 POM ELITE CURAPLEX A/

## (undated) DEVICE — ESOPHAGEAL BALLOON DILATATION CATHETER: Brand: CRE FIXED WIRE

## (undated) DEVICE — SUT VIC 5/0 PS2 18IN J495H

## (undated) DEVICE — SUT VIC 0 TN 27IN DYED JTN0G

## (undated) DEVICE — 2, DISPOSABLE SUCTION/IRRIGATOR WITH DISPOSABLE TIP: Brand: STRYKEFLOW

## (undated) DEVICE — SUT GUT CHRM 2/0 SH 27IN G123H

## (undated) DEVICE — EXOFIN PRECISION PEN HIGH VISCOSITY TOPICAL SKIN ADHESIVE: Brand: EXOFIN PRECISION PEN, 1G

## (undated) DEVICE — ST EXT IV TBG W SECUR LK 20IN

## (undated) DEVICE — PK LAP GEN 90

## (undated) DEVICE — SOL NACL 0.9PCT 1000ML

## (undated) DEVICE — 3M™ MICROFOAM™ SURGICAL TAPE, 2 INCHES X 5 YARDS, 6 ROLLS/CARTON, 6 CARTONS/CASE, 1528-2: Brand: 3M™ MICROFOAM™

## (undated) DEVICE — TUBING, SUCTION, 1/4" X 10', STRAIGHT: Brand: MEDLINE

## (undated) DEVICE — BUR SHAVER FLUSHCUT 8FLUT 5MM 13CM

## (undated) DEVICE — BW-412T DISP COMBO CLEANING BRUSH: Brand: SINGLE USE COMBINATION CLEANING BRUSH

## (undated) DEVICE — KT POSTN ARM TRIMANO BEACH CHR W/DRP

## (undated) DEVICE — ENDOPATH XCEL BLADELESS TROCARS WITH STABILITY SLEEVES: Brand: ENDOPATH XCEL

## (undated) DEVICE — DRAPE,REIN 53X77,STERILE: Brand: MEDLINE

## (undated) DEVICE — SOL HYDROGEN PEROX 3PCT 4OZ

## (undated) DEVICE — BNDG ELAS SLF ADHR 4IN 5YD LTX

## (undated) DEVICE — SYR LUERLOK 20CC BX/50

## (undated) DEVICE — Device

## (undated) DEVICE — BITEBLOCK OMNI BLOC

## (undated) DEVICE — SYR LUERLOK 30CC

## (undated) DEVICE — GLV SURG PREMIERPRO ORTHO LTX PF SZ7.5 BRN

## (undated) DEVICE — ADAPT CLN BIOGUARD AIR/H2O DISP

## (undated) DEVICE — BANDAGE ROLL,100% COTTON, 6 PLY, LARGE: Brand: KERLIX

## (undated) DEVICE — PAD,ABDOMINAL,8"X10",ST,LF: Brand: MEDLINE

## (undated) DEVICE — TOWEL,OR,DSP,ST,BLUE,STD,4/PK,20PK/CS: Brand: MEDLINE

## (undated) DEVICE — BLANKT WARM LOWR/BDY 100X120CM

## (undated) DEVICE — VAGINAL PACKING: Brand: DEROYAL

## (undated) DEVICE — GLV SURG BIOGEL LTX PF 8 1/2

## (undated) DEVICE — SENSR O2 OXIMAX FNGR A/ 18IN NONSTR

## (undated) DEVICE — SPNG LAP 18X18IN LF STRL PK/5

## (undated) DEVICE — LINER SURG CANSTR SXN S/RIGD 1500CC

## (undated) DEVICE — OSC VAGINAL HYSTERECTOMY: Brand: MEDLINE INDUSTRIES, INC.

## (undated) DEVICE — STPCK 3WY ON/OFF VLV M/COLAR FIT 45PSI STRL

## (undated) DEVICE — DRAPE,EXTREMITY,89X128,STERILE: Brand: MEDLINE

## (undated) DEVICE — NDL HYPO PRECISIONGLIDE REG 19G 1 1/2

## (undated) DEVICE — IV ADMIN SET, 15DRP, 3 NF PORTS, 109": Brand: MEDLINE

## (undated) DEVICE — REAMR PILOTED HD 7MM

## (undated) DEVICE — BNDG ELAS MATRX V/CLS 6IN 5YD LF

## (undated) DEVICE — PK ARTHSCP SHLDR TOWER 40

## (undated) DEVICE — ABL ASP APOLLO RF XL 90D

## (undated) DEVICE — DRSNG GZ PETROLTM XEROFORM CURAD 1X8IN STRL

## (undated) DEVICE — KT ORCA ORCAPOD DISP STRL

## (undated) DEVICE — ENDOPATH PNEUMONEEDLE INSUFFLATION NEEDLES WITH LUER LOCK CONNECTORS 120MM: Brand: ENDOPATH

## (undated) DEVICE — SYR LUERLOK 20CC

## (undated) DEVICE — CANN TRPL DAM 7X7MM

## (undated) DEVICE — SPNG GZ BULKEE2 6X6 3/4IN NS LF BG/100

## (undated) DEVICE — CANN TRPL DAM 7X7MM NO VLV

## (undated) DEVICE — CATH CHOLANG 4.5F18IN BRGNDY

## (undated) DEVICE — BLD SHAVER BONECUTTER 4MM 13CM

## (undated) DEVICE — STPCK 3WY D201 DISCOFIX

## (undated) DEVICE — SUT ETHLN 3/0 PS1 18IN 1663H

## (undated) DEVICE — ENDOPATH XCEL UNIVERSAL TROCAR STABLILITY SLEEVES: Brand: ENDOPATH XCEL

## (undated) DEVICE — SKIN PREP TRAY W/CHG: Brand: MEDLINE INDUSTRIES, INC.

## (undated) DEVICE — TP NDL SCORPION MULTIFIRE

## (undated) DEVICE — NDL HYPO PRECISIONGLIDE/REG 30G 1/2 BRN

## (undated) DEVICE — STRIP CLS WND SUTURESTRIP/PLS 0.5X5IN TP1105